# Patient Record
Sex: MALE | Race: WHITE | Employment: FULL TIME | URBAN - METROPOLITAN AREA
[De-identification: names, ages, dates, MRNs, and addresses within clinical notes are randomized per-mention and may not be internally consistent; named-entity substitution may affect disease eponyms.]

---

## 2018-10-30 ENCOUNTER — HOSPITAL ENCOUNTER (EMERGENCY)
Age: 38
Discharge: HOME | End: 2018-10-30
Attending: EMERGENCY MEDICINE
Payer: MEDICARE

## 2018-10-30 VITALS
BODY MASS INDEX: 42.27 KG/M2 | OXYGEN SATURATION: 96 % | TEMPERATURE: 98.6 F | DIASTOLIC BLOOD PRESSURE: 98 MMHG | WEIGHT: 278.9 LBS | HEART RATE: 90 BPM | RESPIRATION RATE: 17 BRPM | HEIGHT: 68 IN | SYSTOLIC BLOOD PRESSURE: 144 MMHG

## 2018-10-30 DIAGNOSIS — R11.2 NAUSEA VOMITING AND DIARRHEA: Primary | ICD-10-CM

## 2018-10-30 DIAGNOSIS — R19.7 NAUSEA VOMITING AND DIARRHEA: Primary | ICD-10-CM

## 2018-10-30 PROCEDURE — 6370000000 HC RX 637 (ALT 250 FOR IP): Performed by: EMERGENCY MEDICINE

## 2018-10-30 PROCEDURE — 6360000002 HC RX W HCPCS: Performed by: EMERGENCY MEDICINE

## 2018-10-30 PROCEDURE — 99283 EMERGENCY DEPT VISIT LOW MDM: CPT

## 2018-10-30 RX ORDER — ONDANSETRON 4 MG/1
TABLET, ORALLY DISINTEGRATING ORAL
Status: DISCONTINUED
Start: 2018-10-30 | End: 2018-10-30 | Stop reason: HOSPADM

## 2018-10-30 RX ORDER — ONDANSETRON 4 MG/1
8 TABLET, ORALLY DISINTEGRATING ORAL ONCE
Status: COMPLETED | OUTPATIENT
Start: 2018-10-30 | End: 2018-10-30

## 2018-10-30 RX ORDER — LOPERAMIDE HYDROCHLORIDE 2 MG/1
CAPSULE ORAL
Status: DISCONTINUED
Start: 2018-10-30 | End: 2018-10-30 | Stop reason: HOSPADM

## 2018-10-30 RX ORDER — LOPERAMIDE HYDROCHLORIDE 2 MG/1
4 CAPSULE ORAL ONCE
Status: COMPLETED | OUTPATIENT
Start: 2018-10-30 | End: 2018-10-30

## 2018-10-30 RX ADMIN — ONDANSETRON 8 MG: 4 TABLET, ORALLY DISINTEGRATING ORAL at 18:45

## 2018-10-30 RX ADMIN — LOPERAMIDE HYDROCHLORIDE 4 MG: 2 CAPSULE ORAL at 18:44

## 2018-10-30 NOTE — ED PROVIDER NOTES
CHIEF COMPLAINT  Emesis      HISTORY OF PRESENT ILLNESS  Maynor Robin  is a 34 y.o. male who presents to the ED at via private vehicle complaining of nausea and vomiting as well as diarrhea. Patient reports his symptoms were fairly significant 2-3 days ago stating that he had 5+ episodes of diarrhea as well as severe difficulty in keeping down food or fluids. Patient denies any fevers but had noted some sweats and chills. No abdominal pain was noted. Patient reports that over the last 24 hours his symptoms have almost completely resolved. Patient has been able to tolerate oral food and fluids throughout the day without difficulty. No episodes of diarrhea have been noted. Patient notes only some mild nausea at this time. Patient presents to the emergency department requesting a work note. There are no other complaints, modifying factors or associated symptoms. Nursing notes reviewed. Past medical history:  has no past medical history on file. Past surgical history:  has a past surgical history that includes [place] tooth extraction and hernia repair. Home medications:   Prior to Admission medications    Medication Sig Start Date End Date Taking? Authorizing Provider   ondansetron (ZOFRAN) 4 MG tablet Take 1 tablet by mouth every 8 hours as needed for Nausea or Vomiting 4/2/17   Kiersten Ramos MD       No Known Allergies    Social history:  reports that he has never smoked. He has never used smokeless tobacco. He reports that he drinks alcohol. He reports that he does not use drugs. Family history:  History reviewed. No pertinent family history. REVIEW OF SYSTEMS  6 systems reviewed, pertinent positives per HPI otherwise noted to be negative    PHYSICAL EXAM  Vitals:    10/30/18 1807   BP: (!) 144/98   Pulse: 90   Resp: 17   Temp: 98.6 °F (37 °C)   SpO2: 96%       GENERAL: Patient is well-developed, well-nourished,  no acute distress. No apparent discomfort.   Non toxic

## 2019-03-22 ENCOUNTER — HOSPITAL ENCOUNTER (EMERGENCY)
Age: 39
Discharge: HOME | End: 2019-03-22
Attending: EMERGENCY MEDICINE
Payer: MEDICARE

## 2019-03-22 VITALS
HEART RATE: 94 BPM | DIASTOLIC BLOOD PRESSURE: 99 MMHG | TEMPERATURE: 98 F | OXYGEN SATURATION: 97 % | RESPIRATION RATE: 18 BRPM | SYSTOLIC BLOOD PRESSURE: 160 MMHG

## 2019-03-22 DIAGNOSIS — J01.91 ACUTE RECURRENT SINUSITIS, UNSPECIFIED LOCATION: Primary | ICD-10-CM

## 2019-03-22 PROCEDURE — 99282 EMERGENCY DEPT VISIT SF MDM: CPT

## 2019-03-22 RX ORDER — AMOXICILLIN AND CLAVULANATE POTASSIUM 875; 125 MG/1; MG/1
1 TABLET, FILM COATED ORAL 2 TIMES DAILY
Qty: 20 TABLET | Refills: 0 | Status: SHIPPED | OUTPATIENT
Start: 2019-03-22 | End: 2019-04-01

## 2019-05-07 ENCOUNTER — HOSPITAL ENCOUNTER (EMERGENCY)
Age: 39
Discharge: HOME | End: 2019-05-07
Attending: EMERGENCY MEDICINE
Payer: MEDICARE

## 2019-05-07 VITALS
HEIGHT: 69 IN | WEIGHT: 273.4 LBS | OXYGEN SATURATION: 99 % | HEART RATE: 86 BPM | BODY MASS INDEX: 40.49 KG/M2 | RESPIRATION RATE: 14 BRPM | SYSTOLIC BLOOD PRESSURE: 139 MMHG | DIASTOLIC BLOOD PRESSURE: 98 MMHG | TEMPERATURE: 98.3 F

## 2019-05-07 DIAGNOSIS — R03.0 ELEVATED BLOOD PRESSURE READING: ICD-10-CM

## 2019-05-07 DIAGNOSIS — S61.214A LACERATION OF RIGHT RING FINGER WITHOUT FOREIGN BODY WITHOUT DAMAGE TO NAIL, INITIAL ENCOUNTER: Primary | ICD-10-CM

## 2019-05-07 PROCEDURE — 90715 TDAP VACCINE 7 YRS/> IM: CPT | Performed by: EMERGENCY MEDICINE

## 2019-05-07 PROCEDURE — 6370000000 HC RX 637 (ALT 250 FOR IP): Performed by: EMERGENCY MEDICINE

## 2019-05-07 PROCEDURE — 6360000002 HC RX W HCPCS: Performed by: EMERGENCY MEDICINE

## 2019-05-07 PROCEDURE — 90471 IMMUNIZATION ADMIN: CPT | Performed by: EMERGENCY MEDICINE

## 2019-05-07 PROCEDURE — 99282 EMERGENCY DEPT VISIT SF MDM: CPT

## 2019-05-07 RX ORDER — BACITRACIN ZINC AND POLYMYXIN B SULFATE 500; 1000 [USP'U]/G; [USP'U]/G
OINTMENT TOPICAL ONCE
Status: COMPLETED | OUTPATIENT
Start: 2019-05-07 | End: 2019-05-07

## 2019-05-07 RX ADMIN — BACITRACIN ZINC AND POLYMYXIN B SULFATE: 500; 10000 OINTMENT TOPICAL at 10:49

## 2019-05-07 RX ADMIN — TETANUS TOXOID, REDUCED DIPHTHERIA TOXOID AND ACELLULAR PERTUSSIS VACCINE, ADSORBED 0.5 ML: 5; 2.5; 8; 8; 2.5 SUSPENSION INTRAMUSCULAR at 10:48

## 2019-05-07 ASSESSMENT — PAIN SCALES - GENERAL: PAINLEVEL_OUTOF10: 3

## 2019-05-07 ASSESSMENT — PAIN DESCRIPTION - ORIENTATION: ORIENTATION: RIGHT

## 2019-05-07 ASSESSMENT — PAIN DESCRIPTION - LOCATION: LOCATION: FINGER (COMMENT WHICH ONE)

## 2019-05-07 ASSESSMENT — PAIN DESCRIPTION - PAIN TYPE: TYPE: ACUTE PAIN

## 2019-05-07 NOTE — ED NOTES
Pt states understanding of d/c instructions and medications. Pt alert and oriented with steady gait upon discharge.       Toby Brunner RN  05/07/19 1456

## 2019-05-07 NOTE — ED PROVIDER NOTES
TR[facility] CHIEF COMPLAINT:   Chief Complaint   Patient presents with    Laceration         HPI: Valdez Cooper is a 34 y.o. male who presents to the Emergency Department with complaint of Laceration of the right fourth fingertip which occurred from a glass 3 days ago. Patient is right-handed. Tetanus status is not up-to-date. He has been cleaning it at home but is worried it might be infected. No fever or chills. Denies numbness or weakness. REVIEW OF SYSTEMS:  6 systems reviewed. Pertinent positives per HPI. Otherwise noted to be negative. Nursing notes reviewed and agree with above. Past medical/surgical history reviewed. MEDICATIONS   Patient's Medications    No medications on file         ALLERGIES No Known Allergies      BP (!) 139/98   Pulse 86   Temp 98.3 °F (36.8 °C) (Oral)   Resp 14   Ht 5' 9\" (1.753 m)   Wt 124 kg (273 lb 6.4 oz)   SpO2 99%   BMI 40.37 kg/m²   General:  No acute distress. Non toxic appearance  Head:   Normocephalic and atraumatic  Eyes:   Conjunctiva clear, MONAE, EOM's intact. Sclera anicteric. ENT:   Mucous membranes moist  Neck:   Supple. No adenopathy or jugular venous distension  Lungs/Chest:  No respiratory distress  CVS:   Regular rate and rhythm  Abdomen:  deferred  Extremities:  Full range of motion. Superficial subacute appearing flap-type laceration noted on the ulnar aspect of the right fourth fingertip. No nail or nailbed involvement. No redness or lymphangitic streaking. There is no tenderness. Skin:   No rashes or lesions to exposed skin  Back:   deferred  Neuro:  Alert and OX3. Speech clear and appropriate. No focal weakness. Normal sensation in all extremities. Gait normal.  Psych:   Affect normal. Mood normal        RADIOLOGY:      LAB      ED COURSE / MDM:  Patient with 3 day old laceration on the tip of the right fourth finger. He was worried about infection but there is no evidence of cellulitis. It appears to be healing well.   He is afebrile. Tetanus status was updated. He was given wound care instructions. Advised Tylenol or ibuprofen if needed for pain. I discussed with Sarika Jacobsen the results of evaluation in the Emergency Department, diagnosis, care and prognosis. The plan is to discharge to home. The patient is in agreement with the plan and questions have been answered. I also discussed with the patient and/or family the reasons which may require a return visit and the importance of follow-up care.        (Please note that portions of this note may have been completed with a voice recognition program.  Efforts were made to edit the dictation but occasionally words are mis-transcribed)        FINAL IMPRESSION:  1 -- laceration right fourth finger, subacute  2 -- elevated blood pressure reading                    Rock Stacy MD  05/07/19 5922

## 2019-05-07 NOTE — ED TRIAGE NOTES
Pt with laceration to right 4th finger. Happened Saturday night from a broken wine glass. Unsure of last tetanus.

## 2019-07-07 ENCOUNTER — HOSPITAL ENCOUNTER (EMERGENCY)
Age: 39
Discharge: HOME | End: 2019-07-07
Attending: EMERGENCY MEDICINE
Payer: MEDICARE

## 2019-07-07 VITALS
WEIGHT: 277 LBS | RESPIRATION RATE: 16 BRPM | TEMPERATURE: 97.9 F | DIASTOLIC BLOOD PRESSURE: 90 MMHG | SYSTOLIC BLOOD PRESSURE: 160 MMHG | HEART RATE: 79 BPM | OXYGEN SATURATION: 95 % | BODY MASS INDEX: 40.91 KG/M2

## 2019-07-07 DIAGNOSIS — B97.89 VIRAL SINUSITIS: ICD-10-CM

## 2019-07-07 DIAGNOSIS — H73.011 BULLOUS MYRINGITIS OF RIGHT EAR: ICD-10-CM

## 2019-07-07 DIAGNOSIS — H81.11 BENIGN PAROXYSMAL POSITIONAL VERTIGO OF RIGHT EAR: ICD-10-CM

## 2019-07-07 DIAGNOSIS — J06.9 VIRAL UPPER RESPIRATORY INFECTION: ICD-10-CM

## 2019-07-07 DIAGNOSIS — J01.10 ACUTE FRONTAL SINUSITIS, RECURRENCE NOT SPECIFIED: Primary | ICD-10-CM

## 2019-07-07 DIAGNOSIS — J32.9 VIRAL SINUSITIS: ICD-10-CM

## 2019-07-07 PROCEDURE — 99283 EMERGENCY DEPT VISIT LOW MDM: CPT

## 2019-07-07 RX ORDER — MECLIZINE HYDROCHLORIDE 25 MG/1
25 TABLET ORAL 3 TIMES DAILY PRN
Qty: 20 TABLET | Refills: 0 | Status: SHIPPED | OUTPATIENT
Start: 2019-07-07 | End: 2020-04-14

## 2019-07-07 RX ORDER — ECHINACEA PURPUREA EXTRACT 125 MG
2 TABLET ORAL PRN
Qty: 1 BOTTLE | Refills: 3 | Status: SHIPPED | OUTPATIENT
Start: 2019-07-07 | End: 2020-04-14

## 2019-07-07 RX ORDER — IBUPROFEN 600 MG/1
600 TABLET ORAL EVERY 6 HOURS PRN
Qty: 30 TABLET | Refills: 0 | OUTPATIENT
Start: 2019-07-07 | End: 2020-12-18

## 2019-07-07 RX ORDER — PSEUDOEPHEDRINE HCL 120 MG/1
120 TABLET, FILM COATED, EXTENDED RELEASE ORAL EVERY 12 HOURS PRN
Qty: 20 TABLET | Refills: 1 | Status: SHIPPED | OUTPATIENT
Start: 2019-07-07 | End: 2019-07-14

## 2019-07-07 ASSESSMENT — PAIN DESCRIPTION - LOCATION: LOCATION: HEAD

## 2019-07-07 ASSESSMENT — PAIN DESCRIPTION - FREQUENCY: FREQUENCY: INTERMITTENT

## 2019-07-07 ASSESSMENT — PAIN DESCRIPTION - DESCRIPTORS: DESCRIPTORS: DISCOMFORT;POUNDING

## 2019-07-07 ASSESSMENT — PAIN SCALES - GENERAL: PAINLEVEL_OUTOF10: 4

## 2019-07-07 ASSESSMENT — PAIN DESCRIPTION - ORIENTATION: ORIENTATION: RIGHT;LEFT

## 2019-07-07 ASSESSMENT — PAIN DESCRIPTION - PAIN TYPE: TYPE: ACUTE PAIN

## 2019-08-04 ENCOUNTER — HOSPITAL ENCOUNTER (EMERGENCY)
Age: 39
Discharge: HOME | End: 2019-08-04
Attending: EMERGENCY MEDICINE
Payer: MEDICARE

## 2019-08-04 VITALS
HEART RATE: 89 BPM | DIASTOLIC BLOOD PRESSURE: 98 MMHG | TEMPERATURE: 98.3 F | SYSTOLIC BLOOD PRESSURE: 129 MMHG | OXYGEN SATURATION: 99 % | BODY MASS INDEX: 40.73 KG/M2 | RESPIRATION RATE: 12 BRPM | WEIGHT: 275 LBS | HEIGHT: 69 IN

## 2019-08-04 DIAGNOSIS — R11.2 NON-INTRACTABLE VOMITING WITH NAUSEA, UNSPECIFIED VOMITING TYPE: Primary | ICD-10-CM

## 2019-08-04 PROCEDURE — 99281 EMR DPT VST MAYX REQ PHY/QHP: CPT

## 2019-08-24 ENCOUNTER — HOSPITAL ENCOUNTER (EMERGENCY)
Age: 39
Discharge: HOME | End: 2019-08-24
Attending: EMERGENCY MEDICINE
Payer: MEDICARE

## 2019-08-24 VITALS
RESPIRATION RATE: 18 BRPM | OXYGEN SATURATION: 96 % | BODY MASS INDEX: 41.35 KG/M2 | TEMPERATURE: 98.5 F | SYSTOLIC BLOOD PRESSURE: 143 MMHG | WEIGHT: 280 LBS | HEART RATE: 112 BPM | DIASTOLIC BLOOD PRESSURE: 95 MMHG

## 2019-08-24 DIAGNOSIS — J01.00 ACUTE MAXILLARY SINUSITIS, RECURRENCE NOT SPECIFIED: Primary | ICD-10-CM

## 2019-08-24 PROCEDURE — 6370000000 HC RX 637 (ALT 250 FOR IP): Performed by: EMERGENCY MEDICINE

## 2019-08-24 PROCEDURE — 99283 EMERGENCY DEPT VISIT LOW MDM: CPT

## 2019-08-24 RX ORDER — DOXYCYCLINE 100 MG/1
100 TABLET ORAL 2 TIMES DAILY
Qty: 20 TABLET | Refills: 0 | Status: SHIPPED | OUTPATIENT
Start: 2019-08-24 | End: 2019-09-03

## 2019-08-24 RX ORDER — DOXYCYCLINE 100 MG/1
100 CAPSULE ORAL ONCE
Status: COMPLETED | OUTPATIENT
Start: 2019-08-24 | End: 2019-08-24

## 2019-08-24 RX ORDER — BENZONATATE 100 MG/1
100 CAPSULE ORAL 3 TIMES DAILY PRN
Qty: 30 CAPSULE | Refills: 0 | Status: SHIPPED | OUTPATIENT
Start: 2019-08-24 | End: 2019-08-31

## 2019-08-24 RX ORDER — CETIRIZINE HYDROCHLORIDE 10 MG/1
10 TABLET ORAL DAILY
Qty: 20 TABLET | Refills: 1 | Status: SHIPPED | OUTPATIENT
Start: 2019-08-24 | End: 2020-04-14

## 2019-08-24 RX ADMIN — DOXYCYCLINE 100 MG: 100 CAPSULE ORAL at 20:53

## 2019-08-24 ASSESSMENT — PAIN DESCRIPTION - LOCATION: LOCATION: GENERALIZED;HEAD

## 2019-08-24 ASSESSMENT — PAIN SCALES - GENERAL: PAINLEVEL_OUTOF10: 2

## 2019-08-24 ASSESSMENT — PAIN DESCRIPTION - DESCRIPTORS: DESCRIPTORS: DISCOMFORT;PRESSURE

## 2019-08-24 ASSESSMENT — PAIN DESCRIPTION - PAIN TYPE: TYPE: ACUTE PAIN

## 2019-08-25 NOTE — ED PROVIDER NOTES
CETIRIZINE (ZYRTEC ALLERGY) 10 MG TABLET    Take 1 tablet by mouth daily    DOXYCYCLINE MONOHYDRATE (ADOXA) 100 MG TABLET    Take 1 tablet by mouth 2 times daily for 10 days       DISCONTINUED MEDICATIONS:  Discontinued Medications    No medications on file              (Please note that portions of this note were completed with a voice recognition program.  Efforts were made to edit the dictations but occasionally words are mis-transcribed.)    Teena Cody MD (electronically signed)      Teena Cody MD  08/24/19 6578

## 2020-04-14 ENCOUNTER — HOSPITAL ENCOUNTER (EMERGENCY)
Age: 40
Discharge: HOME | End: 2020-04-14
Attending: EMERGENCY MEDICINE
Payer: MEDICARE

## 2020-04-14 VITALS
DIASTOLIC BLOOD PRESSURE: 100 MMHG | HEART RATE: 102 BPM | TEMPERATURE: 98 F | OXYGEN SATURATION: 97 % | HEIGHT: 69 IN | BODY MASS INDEX: 41.59 KG/M2 | SYSTOLIC BLOOD PRESSURE: 144 MMHG | RESPIRATION RATE: 16 BRPM | WEIGHT: 280.8 LBS

## 2020-04-14 PROCEDURE — 99282 EMERGENCY DEPT VISIT SF MDM: CPT

## 2020-04-14 RX ORDER — TOBRAMYCIN 3 MG/ML
1 SOLUTION/ DROPS OPHTHALMIC EVERY 4 HOURS
Qty: 1 BOTTLE | Refills: 0 | Status: SHIPPED | OUTPATIENT
Start: 2020-04-14 | End: 2020-04-24

## 2020-04-14 ASSESSMENT — VISUAL ACUITY
OU: 20/25
OS: 20/25
OD: 20/25

## 2020-09-18 ENCOUNTER — HOSPITAL ENCOUNTER (EMERGENCY)
Age: 40
Discharge: HOME | End: 2020-09-18
Attending: EMERGENCY MEDICINE
Payer: MEDICARE

## 2020-09-18 VITALS
BODY MASS INDEX: 41.16 KG/M2 | HEART RATE: 102 BPM | RESPIRATION RATE: 16 BRPM | DIASTOLIC BLOOD PRESSURE: 108 MMHG | OXYGEN SATURATION: 99 % | TEMPERATURE: 98.2 F | SYSTOLIC BLOOD PRESSURE: 144 MMHG | WEIGHT: 277.9 LBS | HEIGHT: 69 IN

## 2020-09-18 PROCEDURE — 99283 EMERGENCY DEPT VISIT LOW MDM: CPT

## 2020-09-18 ASSESSMENT — ENCOUNTER SYMPTOMS
BACK PAIN: 0
SINUS PRESSURE: 1
RHINORRHEA: 1
SORE THROAT: 1
SINUS PAIN: 1
SHORTNESS OF BREATH: 0
DIARRHEA: 0
NAUSEA: 0
COUGH: 1
VOMITING: 0
CONSTIPATION: 0
ABDOMINAL PAIN: 0

## 2020-09-18 NOTE — ED PROVIDER NOTES
[id number] [address] ENCOUNTER      Pt Name: Darrius Cline  MRN: [id number]  [place] 1980  Date of evaluation: 9/18/2020  Provider: David Fiore MD    [address] [place] [address]       Chief Complaint   Patient presents with    Headache    Facial Pain     x 5 days         HISTORY OF PRESENT ILLNESS   (Location/Symptom, Timing/Onset, Context/Setting, Quality, Duration, Modifying Factors, Severity)  Note limiting factors. Darrius Cline is a 41 y.o. male who presents to the emergency department     Patient is a 43-year-old male with a past medical history of hypertension and hyperlipidemia who presents with sinus headache and drainage. Patient reports that his headache started on Monday. He states that the headache is gotten significantly better but the sinus pressure and postnasal drip has continued. Patient reports that he has had some drainage and discomfort in his right ear. He reports some discomfort in the back of his throat due to the drainage down the back of his throat. Reports occasional cough but denies any shortness of breath. Denies any fevers or chills. Patient reports that he has a history of chronic sinusitis. He states that every few months he gets a flareup of symptoms. Symptoms caused him to miss work today and they would not allow him to return until he was evaluated. The history is provided by the patient. Nursing Notes were reviewed. REVIEW OF SYSTEMS    (2-9 systems for level 4, 10 or more for level 5)     Review of Systems   Constitutional: Negative for chills and fever. HENT: Positive for congestion, ear pain, postnasal drip, rhinorrhea, sinus pressure, sinus pain and sore throat. Eyes: Negative for visual disturbance. Respiratory: Positive for cough. Negative for shortness of breath. Cardiovascular: Negative for chest pain. Gastrointestinal: Negative for abdominal pain, constipation, diarrhea, nausea and vomiting. Genitourinary: Negative for dysuria and hematuria. Musculoskeletal: Negative for back pain and neck pain. Skin: Negative for pallor and rash. Neurological: Positive for headaches. Negative for dizziness and light-headedness. All other systems reviewed and are negative. Except as noted above the remainder of the review of systems was reviewed and negative. PAST MEDICAL HISTORY     Past Medical History:   Diagnosis Date    Hyperlipidemia     Hypertension          SURGICAL HISTORY       Past Surgical History:   Procedure Laterality Date    HERNIA REPAIR      WISDOM TOOTH EXTRACTION           CURRENT MEDICATIONS       Previous Medications    IBUPROFEN (ADVIL;MOTRIN) 600 MG TABLET    Take 1 tablet by mouth every 6 hours as needed for Pain       ALLERGIES     Patient has no known allergies. FAMILY HISTORY     History reviewed. No pertinent family history.        SOCIAL HISTORY       Social History     Socioeconomic History    Marital status: Single     Spouse name: None    Number of children: None    Years of education: None    Highest education level: None   Occupational History    None   Social Needs    Financial resource strain: None    Food insecurity     Worry: None     Inability: None    Transportation needs     Medical: None     Non-medical: None   Tobacco Use    Smoking status: Never Smoker    Smokeless tobacco: Never Used   Substance and Sexual Activity    Alcohol use: Not Currently    Drug use: No    Sexual activity: None   Lifestyle    Physical activity     Days per week: None     Minutes per session: None    Stress: None   Relationships    Social connections     Talks on phone: None     Gets together: None     Attends Baptism service: None     Active member of club or organization: None     Attends meetings of clubs or organizations: None     Relationship status: None    Intimate partner violence     Fear of current or ex partner: None     Emotionally abused: None Physically abused: None     Forced sexual activity: None   Other Topics Concern    None   Social History Narrative    None       SCREENINGS               PHYSICAL EXAM    (up to 7 for level 4, 8 or more for level 5)     ED Triage Vitals   BP Temp Temp src Pulse Resp SpO2 Height Weight   -- -- -- -- -- -- -- --       Physical Exam  Vitals signs and nursing note reviewed. Constitutional:       General: He is not in acute distress. Appearance: Normal appearance. HENT:      Head: Normocephalic and atraumatic. Right Ear: Tympanic membrane and ear canal normal.      Left Ear: Tympanic membrane and ear canal normal.      Nose: Congestion and rhinorrhea present. Mouth/Throat:      Mouth: Mucous membranes are moist.      Pharynx: No posterior oropharyngeal erythema. Eyes:      Conjunctiva/sclera: Conjunctivae normal.   Neck:      Musculoskeletal: Normal range of motion and neck supple. Cardiovascular:      Rate and Rhythm: Normal rate and regular rhythm. Pulses: Normal pulses. Heart sounds: Normal heart sounds. No murmur. Pulmonary:      Effort: Pulmonary effort is normal. No respiratory distress. Breath sounds: Normal breath sounds. Abdominal:      General: There is no distension. Palpations: Abdomen is soft. Tenderness: There is no abdominal tenderness. Musculoskeletal: Normal range of motion. General: No swelling or deformity. Skin:     General: Skin is warm and dry. Neurological:      General: No focal deficit present. Mental Status: He is alert and oriented to person, place, and time.          DIAGNOSTIC RESULTS     EKG: All EKG's are interpreted by the Emergency Department Physician who either signs or Co-signs this chart in the absence of a cardiologist.        RADIOLOGY:     Interpretation per the Radiologist below, if available at the time of this note:    No orders to display         LABS:  Labs Reviewed - No data to display    All other labs were within normal range or not returned as of this dictation. EMERGENCY DEPARTMENT COURSE and DIFFERENTIAL DIAGNOSIS/MDM:   Vitals:    Vitals:    09/18/20 0334   BP: (!) 144/108   Pulse: 102   Resp: 16   Temp: 98.2 °F (36.8 °C)   TempSrc: Oral   SpO2: 99%   Weight: 277 lb 14.4 oz (126.1 kg)   Height: 5' 9\" (1.753 m)       Patient evaluated and previous record reviewed. Patient presents with headache and sinus drainage. Vital signs notable for mild tachycardia improved with rest.  Physical exam as documented above. Patient symptoms most consistent with postnasal drip and sinus headache. Advised patient of supportive care measures at home. Do not believe he needs an antibiotic at this time as symptoms started 5 days ago. Patient mostly just needs to be evaluated so that he can return to work on Monday. Will provide him with documentation. Advised of return precautions. Patient discharged home. CONSULTS:  None    PROCEDURES:  Unless otherwise noted below, none     Procedures      FINAL IMPRESSION      1. Postnasal drip    2. Sinus headache          DISPOSITION/PLAN   DISPOSITION        PATIENT REFERRED TO:  Juanamechelleherve Garay  [address] [id number]  [phone]    Schedule an appointment as soon as possible for a visit         DISCHARGE MEDICATIONS:  New Prescriptions    No medications on file     Controlled Substances Monitoring:     No flowsheet data found.     (Please note that portions of this note were completed with a voice recognition program.  Efforts were made to edit the dictations but occasionally words are mis-transcribed.)    Tristan Juares MD (electronically signed)  Attending Emergency Physician           Valerie Danielson MD  09/18/20 4137

## 2020-09-18 NOTE — ED NOTES
Pt to ed with c/o headache 4 days ago, 3 days ago started having sinus drainage and pressure in face, tonight states his headache is gone, just having sinus drainage and pressure, and he also needs a work note to go back to work.      Harini Royal RN  09/18/20 0268

## 2020-09-18 NOTE — ED NOTES
Pt dc/d with instructions and work note in stable condition, ambulatory to lobby. Home per ride.      Patrick Smith RN  09/18/20 0093

## 2020-10-27 ENCOUNTER — HOSPITAL ENCOUNTER (EMERGENCY)
Age: 40
Discharge: HOME | End: 2020-10-27
Attending: EMERGENCY MEDICINE
Payer: MEDICARE

## 2020-10-27 VITALS
WEIGHT: 282.9 LBS | HEART RATE: 69 BPM | TEMPERATURE: 97.7 F | SYSTOLIC BLOOD PRESSURE: 136 MMHG | OXYGEN SATURATION: 99 % | RESPIRATION RATE: 18 BRPM | DIASTOLIC BLOOD PRESSURE: 91 MMHG | BODY MASS INDEX: 41.78 KG/M2

## 2020-10-27 PROCEDURE — 99282 EMERGENCY DEPT VISIT SF MDM: CPT

## 2020-10-27 RX ORDER — FLUTICASONE PROPIONATE 50 MCG
1 SPRAY, SUSPENSION (ML) NASAL DAILY
Qty: 2 BOTTLE | Refills: 1 | Status: SHIPPED | OUTPATIENT
Start: 2020-10-27 | End: 2020-12-18

## 2020-10-27 RX ORDER — BENZONATATE 100 MG/1
100 CAPSULE ORAL 3 TIMES DAILY PRN
Qty: 30 CAPSULE | Refills: 0 | Status: SHIPPED | OUTPATIENT
Start: 2020-10-27 | End: 2020-11-03

## 2020-10-27 RX ORDER — LORATADINE 10 MG/1
10 TABLET ORAL DAILY
Qty: 30 TABLET | Refills: 0 | Status: SHIPPED | OUTPATIENT
Start: 2020-10-27 | End: 2020-12-18

## 2020-10-28 NOTE — ED NOTES
Patient prepared for and ready to be discharged. Patient discharged at this time in no acute distress after verbalizing understanding of discharge instructions. Patient left after receiving After Visit Summary instructions.         Cindy Howard RN  10/27/20 7119

## 2020-10-28 NOTE — ED PROVIDER NOTES
Emergency Physician Note    Chief Complaint  Sinusitis       History of Present Illness  Disha Martinez is a 41 y.o. male who presents to the ED for sinusitis. Patient reports that for last few days he has had nasal congestion and drainage and coughing and even had some episodes of vomiting with the coughing. No fevers, chills or sweats. No exposure to COVID-19. No chest pain or shortness of breath. He states this happens just about every year. He called off of work and was told that he needed a note to return to work. He has no other complaints. 10 systems reviewed, pertinent positives per HPI otherwise noted to be negative    I have reviewed the following from the nursing documentation:      Prior to Admission medications    Medication Sig Start Date End Date Taking? Authorizing Provider   ibuprofen (ADVIL;MOTRIN) 600 MG tablet Take 1 tablet by mouth every 6 hours as needed for Pain 7/7/19   Francisco Javier Hong MD       Allergies as of 10/27/2020    (No Known Allergies)       Past Medical History:   Diagnosis Date    Hyperlipidemia     Hypertension         Surgical History:   Past Surgical History:   Procedure Laterality Date    HERNIA REPAIR      WISDOM TOOTH EXTRACTION          Family History:  History reviewed. No pertinent family history.     Social History     Socioeconomic History    Marital status: Single     Spouse name: Not on file    Number of children: Not on file    Years of education: Not on file    Highest education level: Not on file   Occupational History    Not on file   Social Needs    Financial resource strain: Not on file    Food insecurity     Worry: Not on file     Inability: Not on file    Transportation needs     Medical: Not on file     Non-medical: Not on file   Tobacco Use    Smoking status: Never Smoker    Smokeless tobacco: Never Used   Substance and Sexual Activity    Alcohol use: Not Currently    Drug use: No    Sexual activity: Not on file   Lifestyle    Physical activity     Days per week: Not on file     Minutes per session: Not on file    Stress: Not on file   Relationships    Social connections     Talks on phone: Not on file     Gets together: Not on file     Attends [employer] service: Not on file     Active member of club or organization: Not on file     Attends meetings of clubs or organizations: Not on file     Relationship status: Not on file    Intimate partner violence     Fear of current or ex partner: Not on file     Emotionally abused: Not on file     Physically abused: Not on file     Forced sexual activity: Not on file   Other Topics Concern    Not on file   Social History Narrative    Not on file       Nursing notes reviewed. ED Triage Vitals [10/27/20 2230]   Enc Vitals Group      BP (!) 136/91      Pulse 69      Resp 18      Temp 97.7 °F (36.5 °C)      Temp src       SpO2 99 %      Weight 282 lb 14.4 oz (128.3 kg)      Height       Head Circumference       Peak Flow       Pain Score       Pain Loc       Pain Edu? Excl. in [address]? GENERAL:  Awake, alert. Well developed, well nourished with no apparent distress. HENT:  Normocephalic, Atraumatic, moist mucous membranes. Bilateral TMs without erythema or effusion. Nasopharynx congested with mucus, posterior oropharynx with no edema or exudate but slight erythema noted. EYES:  Pupils equal round and reactive to light, Conjunctiva normal, extraocular movements normal.  NECK:  No meningeal signs, Supple. CHEST:  Regular rate and rhythm, chest wall non-tender. LUNGS:  Clear to auscultation bilaterally. ABDOMEN:  Soft, non-tender, no rebound, rigidity or guarding, non-distended, normal bowel sounds. No costovertebral angle tenderness to palpation. BACK:  No tenderness. EXTREMITIES:  Normal range of motion, no edema, no bony tenderness, no deformity, distal pulses present. SKIN: Warm, dry and intact. NEUROLOGIC: Normal mental status. Moving all extremities to command. MEDICAL DECISION MAKING    I advised the patient to return to the emergency department immediately for any new or worsening symptoms, such as fever, chest pain or shortness of breath. The patient voiced agreement and understanding of the treatment plan. No results found for this visit on 10/27/20. I estimate there is LOW risk for EPIGLOTTITIS, PNEUMONIA, MENINGITIS, OR URINARY TRACT INFECTION, thus I consider the discharge disposition reasonable. Also, there is no evidence or peritonitis, sepsis, or toxicity. Bernardine Leyden and I have discussed the diagnosis and risks, and we agree with discharging home to follow-up with their primary doctor. We also discussed returning to the Emergency Department immediately if new or worsening symptoms occur. We have discussed the symptoms which are most concerning (e.g., changing or worsening pain, trouble swallowing or breating, neck stiffness, fever) that necessitate immediate return. Final Impression    1. Acute sinusitis, recurrence not specified, unspecified location        Discharge Vital Signs:  Blood pressure (!) 136/91, pulse 69, temperature 97.7 °F (36.5 °C), resp. rate 18, weight 282 lb 14.4 oz (128.3 kg), SpO2 99 %. Patient was given scripts for the following medications. I counseled patient how to take these medications. New Prescriptions    BENZONATATE (TESSALON PERLES) 100 MG CAPSULE    Take 1 capsule by mouth 3 times daily as needed for Cough    FLUTICASONE (FLONASE) 50 MCG/ACT NASAL SPRAY    1 spray by Each Nostril route daily    LORATADINE (CLARITIN) 10 MG TABLET    Take 1 tablet by mouth daily       Disposition  Pt is in good condition upon Discharge to home. This chart was generated using the [address] [place] [address] [address] dictation system. I created this record but it may contain dictation errors.           Fatoumata Miner MD  10/27/20 0169

## 2020-12-18 ENCOUNTER — HOSPITAL ENCOUNTER (EMERGENCY)
Age: 40
Discharge: HOME | End: 2020-12-18
Attending: EMERGENCY MEDICINE
Payer: MEDICARE

## 2020-12-18 VITALS
SYSTOLIC BLOOD PRESSURE: 136 MMHG | RESPIRATION RATE: 18 BRPM | DIASTOLIC BLOOD PRESSURE: 91 MMHG | OXYGEN SATURATION: 99 % | BODY MASS INDEX: 41.47 KG/M2 | TEMPERATURE: 97.9 F | WEIGHT: 280 LBS | HEIGHT: 69 IN | HEART RATE: 99 BPM

## 2020-12-18 PROCEDURE — 99283 EMERGENCY DEPT VISIT LOW MDM: CPT

## 2020-12-18 RX ORDER — FLUTICASONE PROPIONATE 50 MCG
2 SPRAY, SUSPENSION (ML) NASAL DAILY
Qty: 1 BOTTLE | Refills: 0 | Status: SHIPPED | OUTPATIENT
Start: 2020-12-18 | End: 2021-07-23 | Stop reason: ALTCHOICE

## 2020-12-18 NOTE — ED PROVIDER NOTES
Dry nasal discharge noted. NECK: Supple with normal ROM. Trachea midline  LUNGS:  Normal work of breathing. Speaking comfortably in full sentences. EXTREMITIES: 2+ distal pulses w/o edema. MUSCULOSKELETAL:  Atraumatic extremities with normal ROM grossly. No obvious bony deformities. SKIN: Warm/dry. No rashes/lesions noted. PSYCHIATRIC: Patient is alert and oriented with normal affect  NEUROLOGIC: Cranial nerves grossly intact. Moves all extremities with equal strength. No gross sensory deficits. Answers questions/follows commands appropriately. ED COURSE/MDM  Nursing notes reviewed. Pt was given the following medications or treatments in the ED: none    Clinical Impression  Based on the presenting complaint, history, and physical exam, multiple diagnoses were considered. Exam and workup here most c/w:  1. Acute upper respiratory infection    2. Nasal congestion        I discussed with Jeremy Choi the results of evaluation in the ED, diagnosis, care, and prognosis. The plan is to discharge to home. Patient is in agreement with plan and questions have been answered. I also discussed with Jeremy Rater the reasons which may require a return visit and the importance of follow-up care. The patient is well-appearing, nontoxic, and improved at the time of discharge. Patient agrees to call to arrange follow-up care as directed. Jeremy Rater understands to return immediately for worsening/change in symptoms. Patient will be started on the following medications from the ED:  New Prescriptions    FLUTICASONE (FLONASE) 50 MCG/ACT NASAL SPRAY    2 sprays by Each Nostril route daily         Disposition  Pt is discharged in stable condition.     Disposition Vitals:  BP (!) 136/91   Pulse 99   Temp 97.9 °F (36.6 °C) (Oral)   Resp 18   Ht 5' 9\" (1.753 m)   Wt 280 lb (127 kg)   SpO2 99%   BMI 41.35 kg/m²                    Danii Leal DO  12/18/20 7093

## 2021-07-23 ENCOUNTER — HOSPITAL ENCOUNTER (EMERGENCY)
Age: 41
Discharge: HOME | End: 2021-07-23
Attending: EMERGENCY MEDICINE
Payer: MEDICARE

## 2021-07-23 VITALS
OXYGEN SATURATION: 98 % | TEMPERATURE: 97.9 F | HEART RATE: 86 BPM | SYSTOLIC BLOOD PRESSURE: 150 MMHG | RESPIRATION RATE: 16 BRPM | DIASTOLIC BLOOD PRESSURE: 103 MMHG | BODY MASS INDEX: 44.44 KG/M2 | HEIGHT: 69 IN | WEIGHT: 300.06 LBS

## 2021-07-23 DIAGNOSIS — G89.29 CHRONIC PAIN OF BOTH KNEES: Primary | ICD-10-CM

## 2021-07-23 DIAGNOSIS — M25.561 CHRONIC PAIN OF BOTH KNEES: Primary | ICD-10-CM

## 2021-07-23 DIAGNOSIS — M25.562 CHRONIC PAIN OF BOTH KNEES: Primary | ICD-10-CM

## 2021-07-23 PROCEDURE — 6360000002 HC RX W HCPCS: Performed by: EMERGENCY MEDICINE

## 2021-07-23 PROCEDURE — 96372 THER/PROPH/DIAG INJ SC/IM: CPT

## 2021-07-23 PROCEDURE — 99283 EMERGENCY DEPT VISIT LOW MDM: CPT

## 2021-07-23 RX ORDER — KETOROLAC TROMETHAMINE 30 MG/ML
30 INJECTION, SOLUTION INTRAMUSCULAR; INTRAVENOUS ONCE
Status: COMPLETED | OUTPATIENT
Start: 2021-07-23 | End: 2021-07-23

## 2021-07-23 RX ADMIN — KETOROLAC TROMETHAMINE 30 MG: 30 INJECTION, SOLUTION INTRAMUSCULAR at 16:11

## 2021-07-23 ASSESSMENT — PAIN SCALES - GENERAL
PAINLEVEL_OUTOF10: 8
PAINLEVEL_OUTOF10: 8
PAINLEVEL_OUTOF10: 4

## 2021-07-23 ASSESSMENT — PAIN DESCRIPTION - PAIN TYPE
TYPE: ACUTE PAIN
TYPE: ACUTE PAIN

## 2021-07-23 ASSESSMENT — PAIN DESCRIPTION - ORIENTATION
ORIENTATION: RIGHT;LEFT
ORIENTATION: RIGHT;LEFT

## 2021-07-23 ASSESSMENT — PAIN DESCRIPTION - LOCATION
LOCATION: KNEE
LOCATION: KNEE

## 2021-07-23 ASSESSMENT — PAIN DESCRIPTION - DESCRIPTORS: DESCRIPTORS: CONSTANT

## 2021-07-23 NOTE — ED PROVIDER NOTES
Emergency Department Attending Note    Candace Welsh MD    Date of ED VIsit: 7/23/2021    CHIEF COMPLAINT  No chief complaint on file. HISTORY OF PRESENT ILLNESS  Torey Goff is a 35 y.o. male  With Vital signs of BP (!) 150/103   Pulse 86   Temp 97.9 °F (36.6 °C) (Oral)   Resp 16   Ht 5' 9\" (1.753 m)   Wt (!) 300 lb 1 oz (136.1 kg)   SpO2 98%   BMI 44.31 kg/m²  who presents to the ED with a complaint of chronic knee pain. Patient seen and evaluated in room 4. Patient has had 2 months of chronic knee pain that seems to exacerbate over the past couple of days prompting him to not go to work. He now comes down here expecting some answers regarding his knees. He says he fell today but caught himself and did not fall onto his knees. I can examine his knees and there is no painful areas. He is able to ambulate he ambulated here from where his home is. But is concerned about going to work because he lifts heavy trays of dishes. .  No other complaints, modifying factors or associated symptoms. Patients Past medical history reviewed and listed below  Past Medical History:   Diagnosis Date    Hyperlipidemia     Hypertension      Past Surgical History:   Procedure Laterality Date    HERNIA REPAIR      WISDOM TOOTH EXTRACTION         I have reviewed the following from the nursing documentation. No family history on file.   Social History     Socioeconomic History    Marital status: Single     Spouse name: Not on file    Number of children: Not on file    Years of education: Not on file    Highest education level: Not on file   Occupational History    Not on file   Tobacco Use    Smoking status: Never Smoker    Smokeless tobacco: Never Used   Substance and Sexual Activity    Alcohol use: Not Currently    Drug use: No    Sexual activity: Not on file   Other Topics Concern    Not on file   Social History Narrative    Not on file     Social Determinants of Health     Financial Resource Strain:     Difficulty of Paying Living Expenses:    Food Insecurity:     Worried About Running Out of Food in the Last Year:     [address] in the Last Year:    Transportation Needs:     Lack of Transportation (Medical):  Lack of Transportation (Non-Medical):    Physical Activity:     Days of Exercise per Week:     Minutes of Exercise per Session:    Stress:     Feeling of Stress :    Social Connections:     Frequency of Communication with Friends and Family:     Frequency of Social Gatherings with Friends and Family:     Attends Spiritism Services:     Active Member of Clubs or Organizations:     Attends Club or Organization Meetings:     Marital Status:    Intimate Partner Violence:     Fear of Current or Ex-Partner:     Emotionally Abused:     Physically Abused:     Sexually Abused:      No current facility-administered medications for this encounter. Current Outpatient Medications   Medication Sig Dispense Refill    fluticasone (FLONASE) 50 MCG/ACT nasal spray 2 sprays by Each Nostril route daily 1 Bottle 0     No Known Allergies    REVIEW OF SYSTEMS  10 systems reviewed, pertinent positives per HPI otherwise noted to be negative     PHYSICAL EXAM  BP (!) 150/103   Pulse 86   Temp 97.9 °F (36.6 °C) (Oral)   Resp 16   Ht 5' 9\" (1.753 m)   Wt (!) 300 lb 1 oz (136.1 kg)   SpO2 98%   BMI 44.31 kg/m²   GENERAL APPEARANCE: Awake and alert. Cooperative. In no obvious distress. HEAD: Normocephalic. Atraumatic. EYES: PERRL. EOM's grossly intact. ENT: Mucous membranes are pink and moist.   NECK: Supple. HEART: RRR. No murmurs. LUNGS: Respirations unlabored. CTAB. Good air exchange. ABDOMEN: Soft. Non-distended. Non-tender. No masses. No organomegaly. No guarding or rebound. EXTREMITIES: No peripheral edema. Moves all extremities equally. All extremities neurovascularly intact. SKIN: Warm and dry. No acute rashes. NEUROLOGICAL: Alert and oriented.   Patient is grossly intact in the lower extremities is able ambulate with normal gait. Strength 5/5, sensation intact. Gait normal.   PSYCHIATRIC: Normal mood and affect. No HI or SI expressed to me. RADIOLOGY    If acquired see below     EKG:     If acquired see below       ED COURSE/MDM    I explained to the patient that there was not much we could do especially since there is no significant trauma. Even if there were treatments of the knees there is very low yield of any radiographs to the knee. He needs to be seen by an orthopedic surgeon who can manage his chronic knee pain. He will be given appropriate referral numbers. He will also be given a shot of Toradol prior prior to discharge to make him feel as though he got some treatment here. The ED course and plan were reviewed and results discussed with the patient    The patient understood and agreed with the Discharge/transfer planning.     CLINICAL IMPRESSION and DISPOSITION    Jazlyn Martinez was stable and diagnosed with bilateral knee pain chronic    Patient was treated with Toradol       Nikia Brewster MD  07/23/21 [id number]

## 2021-07-27 ENCOUNTER — OFFICE VISIT (OUTPATIENT)
Dept: PRIMARY CARE CLINIC | Age: 41
End: 2021-07-27
Payer: MEDICARE

## 2021-07-27 VITALS
HEIGHT: 70 IN | OXYGEN SATURATION: 98 % | WEIGHT: 297 LBS | HEART RATE: 108 BPM | TEMPERATURE: 97 F | DIASTOLIC BLOOD PRESSURE: 87 MMHG | BODY MASS INDEX: 42.52 KG/M2 | SYSTOLIC BLOOD PRESSURE: 136 MMHG

## 2021-07-27 DIAGNOSIS — E66.01 CLASS 3 SEVERE OBESITY DUE TO EXCESS CALORIES WITHOUT SERIOUS COMORBIDITY WITH BODY MASS INDEX (BMI) OF 40.0 TO 44.9 IN ADULT (HCC): ICD-10-CM

## 2021-07-27 DIAGNOSIS — Z11.59 NEED FOR HEPATITIS C SCREENING TEST: ICD-10-CM

## 2021-07-27 DIAGNOSIS — Z86.59 HISTORY OF BIPOLAR DISORDER: ICD-10-CM

## 2021-07-27 DIAGNOSIS — Z86.79 HISTORY OF HYPERTENSION: ICD-10-CM

## 2021-07-27 DIAGNOSIS — G89.29 CHRONIC PAIN OF BOTH KNEES: Primary | ICD-10-CM

## 2021-07-27 DIAGNOSIS — M25.561 CHRONIC PAIN OF BOTH KNEES: Primary | ICD-10-CM

## 2021-07-27 DIAGNOSIS — M25.562 CHRONIC PAIN OF BOTH KNEES: Primary | ICD-10-CM

## 2021-07-27 DIAGNOSIS — Z00.00 ANNUAL PHYSICAL EXAM: ICD-10-CM

## 2021-07-27 PROBLEM — E66.813 CLASS 3 SEVERE OBESITY DUE TO EXCESS CALORIES WITHOUT SERIOUS COMORBIDITY WITH BODY MASS INDEX (BMI) OF 40.0 TO 44.9 IN ADULT: Status: ACTIVE | Noted: 2021-07-27

## 2021-07-27 PROCEDURE — 99386 PREV VISIT NEW AGE 40-64: CPT | Performed by: FAMILY MEDICINE

## 2021-07-27 PROCEDURE — 99203 OFFICE O/P NEW LOW 30 MIN: CPT | Performed by: FAMILY MEDICINE

## 2021-07-27 RX ORDER — MELOXICAM 15 MG/1
15 TABLET ORAL DAILY
Qty: 14 TABLET | Refills: 0 | Status: SHIPPED | OUTPATIENT
Start: 2021-07-27 | End: 2022-04-14 | Stop reason: ALTCHOICE

## 2021-07-27 ASSESSMENT — ENCOUNTER SYMPTOMS
NAUSEA: 0
SORE THROAT: 0
COUGH: 0
ABDOMINAL PAIN: 0
SHORTNESS OF BREATH: 0

## 2021-07-27 ASSESSMENT — PATIENT HEALTH QUESTIONNAIRE - PHQ9
2. FEELING DOWN, DEPRESSED OR HOPELESS: 0
SUM OF ALL RESPONSES TO PHQ QUESTIONS 1-9: 0
SUM OF ALL RESPONSES TO PHQ QUESTIONS 1-9: 0
SUM OF ALL RESPONSES TO PHQ9 QUESTIONS 1 & 2: 0
1. LITTLE INTEREST OR PLEASURE IN DOING THINGS: 0
SUM OF ALL RESPONSES TO PHQ QUESTIONS 1-9: 0

## 2021-07-27 NOTE — PROGRESS NOTES
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 He had been seen in ER recently for knee pain and had been told to establish with PCP. Knee pain has been present for a couple of years - progressive over last 1 month. No injury or trauma. Reports painful crepitus recently. Pain is present all the time, but worse with more activity. Pain is worse for the L knee. No obvious swelling. Not using anything at home for pain. He currently works in [employer]. Would also like to complete wellness. In regards to his weight, when in [employer] reportedly weighed 185lb. Gained a lot of weight after leaving  and gained to 250lb. His current weight is heaviest he has ever been. PHQ Scores 7/27/2021   PHQ2 Score 0   PHQ9 Score 0     Interpretation of Total Score Depression Severity: 1-4 = Minimal depression, 5-9 = Mild depression, 10-14 = Moderate depression, 15-19 = Moderately severe depression, 20-27 = Severe depression     Prior to Visit Medications    Medication Sig Taking?  Authorizing Provider   ibuprofen (ADVIL;MOTRIN) 600 MG tablet Take 1 tablet by mouth every 6 hours as needed for Pain  Lesa Mata MD       Past Medical History:   Diagnosis Date    History of bipolar disorder & PTSD 7/27/2021    Mixed hyperlipidemia 7/29/2021        Social History     Tobacco Use    Smoking status: Never Smoker    Smokeless tobacco: Never Used   Substance Use Topics    Alcohol use: Yes     Comment: rare    Drug use: No        Past Surgical History:   Procedure Laterality Date    INGUINAL HERNIA REPAIR Bilateral     WISDOM TOOTH EXTRACTION          No Known Allergies     Family History   Adopted: Yes   Family history unknown: Yes        Patient's past medical history, surgical history, family history, medications, and allergies  were all reviewed and updated as appropriate today. Review of Systems   Constitutional: Negative for fever. HENT: Negative for ear pain and sore throat. Respiratory: Negative for cough and shortness of breath. Cardiovascular: Negative for chest pain. Gastrointestinal: Negative for abdominal pain and nausea. Musculoskeletal: Positive for arthralgias (bilateral knees). Negative for joint swelling. Skin: Negative for rash. Neurological: Negative for dizziness, weakness, numbness and headaches. Hematological: Negative for adenopathy. /87   Pulse 108   Temp 97 °F (36.1 °C)   Ht 5' 10\" (1.778 m)   Wt 297 lb (134.7 kg)   SpO2 98%   BMI 42.62 kg/m²      Physical Exam  Vitals reviewed. Constitutional:       General: He is not in acute distress. HENT:      Head: Normocephalic. Nose: Nose normal.      Mouth/Throat:      Mouth: Mucous membranes are moist.   Eyes:      Extraocular Movements: Extraocular movements intact. Pupils: Pupils are equal, round, and reactive to light. Cardiovascular:      Rate and Rhythm: Normal rate and regular rhythm. Heart sounds: No murmur heard. Pulmonary:      Effort: Pulmonary effort is normal.      Breath sounds: Normal breath sounds. No wheezing. Abdominal:      General: Bowel sounds are normal.      Palpations: Abdomen is soft. Tenderness: There is no abdominal tenderness. Musculoskeletal:      Cervical back: Neck supple. Right knee: Normal.      Left knee: Normal.   Lymphadenopathy:      Cervical: No cervical adenopathy. Skin:     General: Skin is warm and dry. Findings: No rash. Neurological:      Mental Status: He is alert and oriented to person, place, and time. Cranial Nerves: No cranial nerve deficit. Psychiatric:         Mood and Affect: Mood normal.         Behavior: Behavior is cooperative. Assessment:  Encounter Diagnoses   Name Primary?  Chronic pain of both knees Yes    Annual physical exam     History of hypertension     History of bipolar disorder & PTSD     Class 3 severe obesity due to excess calories without serious comorbidity with body mass index (BMI) of 40.0 to 44.9 in adult [facility])     Need for hepatitis C screening test        Plan:  1. Chronic pain of both knees  Patient to practice today reporting chronic bilateral knee pain without any trauma or injury in the past.  Pain apparently has worsened over the last 1 month. Patient finds it difficult to do any routine activity secondary to pain. He has not actively been treating his knee pain with anything in particular. I will send him for x-rays at this time. Discussed multiple possible etiologies with patient today including arthritis. I think a lot of this is related to his weight and inactivity. I would encourage patient to work on lifestyle modifications and increase his exercise. At this time I have provided a 2-week course of anti-inflammatories to be taken and we will reassess depending on x-ray results and how he is feeling following anti-inflammatory around. - XR KNEE LEFT (3 VIEWS); Future  - XR KNEE RIGHT (3 VIEWS); Future  - meloxicam (MOBIC) 15 MG tablet; Take 1 tablet by mouth daily for 14 days  Dispense: 14 tablet; Refill: 0    2. Annual physical exam  General wellness exam. Reviewed chart for past hx and updated today. Counseled on age appropriate health guidance and discussed screening recommendations. Vaccinations reviewed and discussed. All questions answered  - CBC Auto Differential; Future  - Comprehensive Metabolic Panel, Fasting; Future  - Lipid, Fasting; Future  - Hemoglobin A1C; Future  - TSH with Reflex; Future    3. History of hypertension  Borderline today.     4. History of bipolar disorder & PTSD    5. Class 3 severe obesity due to excess calories without serious comorbidity with body mass index (BMI) of 40.0 to 44.9 in adult [facility])  Patient was asked about his current diet and exercise habits, and personalized advice was provided regarding recommended lifestyle changes. Patient's comorbid health conditions associated with elevated BMI were discussed, including degenerative joint disease, as well as the likely benefits of weight loss. Based upon patient's motivation to change his behavior, the following plan was agreed upon to work toward a weight loss goal of 100 pounds: low carbohydrate diet, exercise for at least 30 minutes 4-5 days per week and patient is not ready to make any meaningul lifestyle changes at this time. Educational materials for  weight loss were provided. Patient will follow-up in 1 year(s) with PCP. Provider spent 2 minutes counseling patient. - Hemoglobin A1C; Future  - TSH with Reflex; Future    6. Need for hepatitis C screening test  Per recommendations issued by the [facility] and the [facility] for Disease Control and Prevention (CDC) in 2020 adults ? 25years of age be screened at least once for chronic HCV infection. Pt agreeable. No know risk of exposure in past or recent per pt. - Hepatitis C Antibody; Future      Return if symptoms worsen or fail to improve. Tong Hodge, DO     Please note that this chart was generated using dragon dictation software. Although every effort was made to ensure the accuracy of this automated transcription, some errors in transcription may have occurred.

## 2021-07-28 ENCOUNTER — HOSPITAL ENCOUNTER (OUTPATIENT)
Age: 41
Discharge: HOME | End: 2021-07-28
Payer: MEDICARE

## 2021-07-28 ENCOUNTER — HOSPITAL ENCOUNTER (OUTPATIENT)
Dept: GENERAL RADIOLOGY | Age: 41
Discharge: HOME | End: 2021-07-28
Payer: MEDICARE

## 2021-07-28 DIAGNOSIS — G89.29 CHRONIC PAIN OF BOTH KNEES: ICD-10-CM

## 2021-07-28 DIAGNOSIS — Z00.00 ANNUAL PHYSICAL EXAM: ICD-10-CM

## 2021-07-28 DIAGNOSIS — M25.561 CHRONIC PAIN OF BOTH KNEES: ICD-10-CM

## 2021-07-28 DIAGNOSIS — E66.01 CLASS 3 SEVERE OBESITY DUE TO EXCESS CALORIES WITHOUT SERIOUS COMORBIDITY WITH BODY MASS INDEX (BMI) OF 40.0 TO 44.9 IN ADULT (HCC): ICD-10-CM

## 2021-07-28 DIAGNOSIS — M25.562 CHRONIC PAIN OF BOTH KNEES: ICD-10-CM

## 2021-07-28 DIAGNOSIS — Z11.59 NEED FOR HEPATITIS C SCREENING TEST: ICD-10-CM

## 2021-07-28 LAB
A/G RATIO: 1.4 (ref 1.1–2.2)
ALBUMIN SERPL-MCNC: 4.4 G/DL (ref 3.4–5)
ALP BLD-CCNC: 65 U/L (ref 40–129)
ALT SERPL-CCNC: 40 U/L (ref 10–40)
ANION GAP SERPL CALCULATED.3IONS-SCNC: 13 MMOL/L (ref 3–16)
AST SERPL-CCNC: 35 U/L (ref 15–37)
BASOPHILS ABSOLUTE: 0.1 K/UL (ref 0–0.2)
BASOPHILS RELATIVE PERCENT: 0.9 %
BILIRUB SERPL-MCNC: 0.5 MG/DL (ref 0–1)
BUN BLDV-MCNC: 14 MG/DL (ref 7–20)
CALCIUM SERPL-MCNC: 9.6 MG/DL (ref 8.3–10.6)
CHLORIDE BLD-SCNC: 103 MMOL/L (ref 99–110)
CHOLESTEROL, FASTING: 236 MG/DL (ref 0–199)
CO2: 25 MMOL/L (ref 21–32)
CREAT SERPL-MCNC: 1 MG/DL (ref 0.9–1.3)
EOSINOPHILS ABSOLUTE: 0.1 K/UL (ref 0–0.6)
EOSINOPHILS RELATIVE PERCENT: 0.9 %
GFR AFRICAN AMERICAN: >60
GFR NON-AFRICAN AMERICAN: >60
GLOBULIN: 3.2 G/DL
GLUCOSE FASTING: 110 MG/DL (ref 70–99)
HCT VFR BLD CALC: 48.3 % (ref 40.5–52.5)
HDLC SERPL-MCNC: 44 MG/DL (ref 40–60)
HEMOGLOBIN: 16.7 G/DL (ref 13.5–17.5)
HEPATITIS C ANTIBODY INTERPRETATION: NORMAL
LDL CHOLESTEROL CALCULATED: 172 MG/DL
LYMPHOCYTES ABSOLUTE: 1.9 K/UL (ref 1–5.1)
LYMPHOCYTES RELATIVE PERCENT: 21.7 %
MCH RBC QN AUTO: 31.1 PG (ref 26–34)
MCHC RBC AUTO-ENTMCNC: 34.6 G/DL (ref 31–36)
MCV RBC AUTO: 89.8 FL (ref 80–100)
MONOCYTES ABSOLUTE: 0.7 K/UL (ref 0–1.3)
MONOCYTES RELATIVE PERCENT: 8.3 %
NEUTROPHILS ABSOLUTE: 5.9 K/UL (ref 1.7–7.7)
NEUTROPHILS RELATIVE PERCENT: 68.2 %
PDW BLD-RTO: 13.9 % (ref 12.4–15.4)
PLATELET # BLD: 231 K/UL (ref 135–450)
PMV BLD AUTO: 10 FL (ref 5–10.5)
POTASSIUM SERPL-SCNC: 4.4 MMOL/L (ref 3.5–5.1)
RBC # BLD: 5.38 M/UL (ref 4.2–5.9)
SODIUM BLD-SCNC: 141 MMOL/L (ref 136–145)
TOTAL PROTEIN: 7.6 G/DL (ref 6.4–8.2)
TRIGLYCERIDE, FASTING: 101 MG/DL (ref 0–150)
TSH REFLEX: 1.38 UIU/ML (ref 0.27–4.2)
VLDLC SERPL CALC-MCNC: 20 MG/DL
WBC # BLD: 8.6 K/UL (ref 4–11)

## 2021-07-28 PROCEDURE — 73562 X-RAY EXAM OF KNEE 3: CPT

## 2021-07-29 PROBLEM — E78.2 MIXED HYPERLIPIDEMIA: Status: ACTIVE | Noted: 2021-07-29

## 2021-07-29 LAB
ESTIMATED AVERAGE GLUCOSE: 102.5 MG/DL
HBA1C MFR BLD: 5.2 %

## 2022-04-06 ENCOUNTER — TELEPHONE (OUTPATIENT)
Dept: PRIMARY CARE CLINIC | Age: 42
End: 2022-04-06

## 2022-04-06 NOTE — TELEPHONE ENCOUNTER
Sebastian Lopes from [employer] calling to verify dx of arthritis of knees and dates of disablity.     Told him I need a records release first    He says he will fax it over

## 2022-04-14 ENCOUNTER — OFFICE VISIT (OUTPATIENT)
Dept: PRIMARY CARE CLINIC | Age: 42
End: 2022-04-14
Payer: COMMERCIAL

## 2022-04-14 VITALS
OXYGEN SATURATION: 97 % | DIASTOLIC BLOOD PRESSURE: 86 MMHG | WEIGHT: 303 LBS | HEIGHT: 70 IN | TEMPERATURE: 96.8 F | BODY MASS INDEX: 43.38 KG/M2 | SYSTOLIC BLOOD PRESSURE: 133 MMHG | HEART RATE: 94 BPM

## 2022-04-14 DIAGNOSIS — G89.29 CHRONIC PAIN OF BOTH KNEES: Primary | ICD-10-CM

## 2022-04-14 DIAGNOSIS — M25.562 CHRONIC PAIN OF BOTH KNEES: Primary | ICD-10-CM

## 2022-04-14 DIAGNOSIS — E66.01 CLASS 3 SEVERE OBESITY DUE TO EXCESS CALORIES WITHOUT SERIOUS COMORBIDITY WITH BODY MASS INDEX (BMI) OF 40.0 TO 44.9 IN ADULT (HCC): ICD-10-CM

## 2022-04-14 DIAGNOSIS — M25.561 CHRONIC PAIN OF BOTH KNEES: Primary | ICD-10-CM

## 2022-04-14 PROCEDURE — 1036F TOBACCO NON-USER: CPT | Performed by: FAMILY MEDICINE

## 2022-04-14 PROCEDURE — G8427 DOCREV CUR MEDS BY ELIG CLIN: HCPCS | Performed by: FAMILY MEDICINE

## 2022-04-14 PROCEDURE — 99213 OFFICE O/P EST LOW 20 MIN: CPT | Performed by: FAMILY MEDICINE

## 2022-04-14 PROCEDURE — G8417 CALC BMI ABV UP PARAM F/U: HCPCS | Performed by: FAMILY MEDICINE

## 2022-04-14 SDOH — ECONOMIC STABILITY: FOOD INSECURITY: WITHIN THE PAST 12 MONTHS, THE FOOD YOU BOUGHT JUST DIDN'T LAST AND YOU DIDN'T HAVE MONEY TO GET MORE.: NEVER TRUE

## 2022-04-14 SDOH — ECONOMIC STABILITY: FOOD INSECURITY: WITHIN THE PAST 12 MONTHS, YOU WORRIED THAT YOUR FOOD WOULD RUN OUT BEFORE YOU GOT MONEY TO BUY MORE.: NEVER TRUE

## 2022-04-14 ASSESSMENT — ENCOUNTER SYMPTOMS
SHORTNESS OF BREATH: 0
COUGH: 0
ABDOMINAL PAIN: 0
NAUSEA: 0
SORE THROAT: 0

## 2022-04-14 ASSESSMENT — SOCIAL DETERMINANTS OF HEALTH (SDOH): HOW HARD IS IT FOR YOU TO PAY FOR THE VERY BASICS LIKE FOOD, HOUSING, MEDICAL CARE, AND HEATING?: SOMEWHAT HARD

## 2022-04-14 NOTE — PROGRESS NOTES
[address] [facility] PRIMARY CARE  [address] [place] [id number]  Dept: [phone]  Dept Fax: [phone]     4/14/2022      Londa Harada   1980     Chief Complaint   Patient presents with    Follow-up       HPI  Pt comes in today for first visit since July 2021. At that time we had talked about knee pain and he went for knee xrays (below is result). There was never any f/u after that. He is here today asking for short term disability to be completed. He reported started new role with new employer 2 months ago. Current job is working on [employer], driving. Reports ongoing and worsening pain of R knee. Reports stopping work April 1 2/2 to pain in the knee. Has no set date of returning to work. Normal schedule is 8h shifts M-F. No new injury that took place from last visit together. 1. Minimal spurring of the medial tibial spine, mild early degenerative changes. Otherwise, medial, lateral and patellofemoral compartments are normal.   2. Normal left       PHQ Scores 7/27/2021   PHQ2 Score 0   PHQ9 Score 0     Interpretation of Total Score Depression Severity: 1-4 = Minimal depression, 5-9 = Mild depression, 10-14 = Moderate depression, 15-19 = Moderately severe depression, 20-27 = Severe depression     Prior to Visit Medications    Medication Sig Taking?  Authorizing Provider       Past Medical History:   Diagnosis Date    History of bipolar disorder & PTSD 7/27/2021    Mixed hyperlipidemia 7/29/2021        Social History     Tobacco Use    Smoking status: Never Smoker    Smokeless tobacco: Never Used   Substance Use Topics    Alcohol use: Yes     Comment: rare    Drug use: No        Past Surgical History:   Procedure Laterality Date    INGUINAL HERNIA REPAIR Bilateral     WISDOM TOOTH EXTRACTION          No Known Allergies     Family History   Adopted: Yes   Family history unknown: Yes        Patient's past medical history, surgical history, family history, medications, and allergies  were all reviewed and updated as appropriate today. Review of Systems   Constitutional: Negative for fever. HENT: Negative for ear pain and sore throat. Respiratory: Negative for cough and shortness of breath. Cardiovascular: Negative for chest pain. Gastrointestinal: Negative for abdominal pain and nausea. Musculoskeletal: Positive for arthralgias. Skin: Negative for rash. Neurological: Negative for dizziness and headaches. Hematological: Negative for adenopathy. /86   Pulse 94   Temp 96.8 °F (36 °C)   Ht 5' 10\" (1.778 m)   Wt (!) 303 lb (137.4 kg)   SpO2 97%   BMI 43.48 kg/m²      Physical Exam  Vitals reviewed. Constitutional:       General: He is not in acute distress. Appearance: He is obese. HENT:      Head: Normocephalic. Nose: Nose normal.      Mouth/Throat:      Mouth: Mucous membranes are moist.   Eyes:      Extraocular Movements: Extraocular movements intact. Pupils: Pupils are equal, round, and reactive to light. Cardiovascular:      Rate and Rhythm: Normal rate and regular rhythm. Heart sounds: No murmur heard. Pulmonary:      Effort: Pulmonary effort is normal.      Breath sounds: Normal breath sounds. No wheezing. Abdominal:      General: Bowel sounds are normal.      Palpations: Abdomen is soft. Tenderness: There is no abdominal tenderness. Musculoskeletal:         General: No swelling or deformity. Cervical back: Neck supple. Right knee: Normal.      Left knee: Normal.   Lymphadenopathy:      Cervical: No cervical adenopathy. Skin:     General: Skin is warm and dry. Findings: No rash. Neurological:      Mental Status: He is alert and oriented to person, place, and time. Cranial Nerves: No cranial nerve deficit. Psychiatric:         Mood and Affect: Mood normal.         Behavior: Behavior is cooperative. Assessment:  Encounter Diagnoses   Name Primary?     Chronic pain of both knees (R>L) Yes    Class 3 severe obesity due to excess calories without serious comorbidity with body mass index (BMI) of 40.0 to 44.9 in [facility])        Plan:  1. Chronic pain of both knees (R>L)  First visit in 9 months, wanting short term disability completed. Informed pt I will not be completing these with what information I have. His level of pain reported does not match with the evaluation we have completed, minimal arthritis seen on Xray in July 2021. At this time I have offered him a referral to Orthopedics for eval.   - [address] [facility]    2. Class 3 severe obesity due to excess calories without serious comorbidity with body mass index (BMI) of 40.0 to 44.9 in [facility])  Patient was asked about current diet and exercise habits, and personalized advice was provided regarding recommended lifestyle changes. Patient's comorbid health conditions associated with elevated BMI were discussed, as well as the likely benefits weight loss. Based upon patient's motivation to change behavior, the following plan was agreed upon to work toward a weight loss goal - increasing CV activity, reducing carbs/calories and healthy eating habits. Educational materials for weight loss were provided. Patient will follow-up with myself at designated time in future. Return if symptoms worsen or fail to improve. Ari Dolly, DO     Please note that this chart was generated using dragon dictation software. Although every effort was made to ensure the accuracy of this automated transcription, some errors in transcription may have occurred.

## 2022-04-14 NOTE — Clinical Note
Regina Keen, I see this pt is on your schedule for the end of this month. I had seen him once in July last year, got plain films, not terribly impressive findings. He showed up again today for first time since then with extensive short term disability forms with full functional assessment for an entirely new job he started 2 months and stopped going in 2 weeks ago 2/2 this pain to R knee. I told him I was not completing those forms and that he should get more eval done first. Just a heads up.  Thanks, Almita Frank

## 2022-04-26 ENCOUNTER — TELEPHONE (OUTPATIENT)
Dept: ORTHOPEDIC SURGERY | Age: 42
End: 2022-04-26

## 2022-04-26 ENCOUNTER — OFFICE VISIT (OUTPATIENT)
Dept: ORTHOPEDIC SURGERY | Age: 42
End: 2022-04-26
Payer: COMMERCIAL

## 2022-04-26 VITALS — BODY MASS INDEX: 43.38 KG/M2 | WEIGHT: 303 LBS | HEIGHT: 70 IN

## 2022-04-26 DIAGNOSIS — M25.561 RIGHT KNEE PAIN, UNSPECIFIED CHRONICITY: ICD-10-CM

## 2022-04-26 DIAGNOSIS — M25.562 LEFT KNEE PAIN, UNSPECIFIED CHRONICITY: Primary | ICD-10-CM

## 2022-04-26 PROCEDURE — G8427 DOCREV CUR MEDS BY ELIG CLIN: HCPCS | Performed by: ORTHOPAEDIC SURGERY

## 2022-04-26 PROCEDURE — L1812 KO ELASTIC W/JOINTS PRE OTS: HCPCS | Performed by: ORTHOPAEDIC SURGERY

## 2022-04-26 PROCEDURE — 99203 OFFICE O/P NEW LOW 30 MIN: CPT | Performed by: ORTHOPAEDIC SURGERY

## 2022-04-26 PROCEDURE — 1036F TOBACCO NON-USER: CPT | Performed by: ORTHOPAEDIC SURGERY

## 2022-04-26 PROCEDURE — G8417 CALC BMI ABV UP PARAM F/U: HCPCS | Performed by: ORTHOPAEDIC SURGERY

## 2022-04-26 ASSESSMENT — ENCOUNTER SYMPTOMS
COUGH: 0
SORE THROAT: 0
SHORTNESS OF BREATH: 0
NAUSEA: 0
ABDOMINAL PAIN: 0

## 2022-04-26 NOTE — TELEPHONE ENCOUNTER
Faxed completed aps to [facility] @ [phone]    Received disability paperwork to complete. The restrictions portion was completed so waiting to verify any restrictions.

## 2022-04-26 NOTE — PROGRESS NOTES
[address] PRIMARY CARE  [address] [facility] [address] [address]  Dept: [phone]  Dept Fax: [phone]     4/26/2022      Megan Lyn   1980     Chief Complaint   Patient presents with    Knee Pain     BILATERAL KNEE     Currently:     His work is with [employer] for the past 2 months. Was having pain in the right knee without trauma. Irritation with use of stairs. He has a history of having pain in the right knee that extended out of her several years preceding his appointment with [facility] include 234 beds at the [facility]. His pain appears to be in the medial aspect of the knee and the anterior aspect of the pain is worse with driving. 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 He has difficulty pushing and pulling heavy objects up a ramp apparently with [facility] include 234 beds at the [facility] [place] airport. Currently is not using any medications for his knee he is not using a brace has not been involved in any physical therapy efforts at all. He states he has gained approximately 60 to 70 pounds over the course of the last 12 months. He attributes this to her PTSD as well as depression. He was an ex [employer] and was discharged prior to his 4-year kamla  He is pending wedding preparations for this upcoming October            Previously:    HPI  Pt comes in today for first visit since July 2021. At that time we had talked about knee pain and he went for knee xrays (below is result). There was never any f/u after that. He is here today asking for short term disability to be completed. He reported started new role with new employer 2 months ago. Current job is working on [employer], driving. Reports ongoing and worsening pain of R knee. Reports stopping work April 1 2/2 to pain in the knee. Has no set date of returning to work. Normal schedule is 8h shifts M-F. No new injury that took place from last visit together. 1. Minimal spurring of the medial tibial spine, mild early degenerative changes.  Otherwise, medial, lateral and patellofemoral compartments are normal.   2. Normal left       PHQ Scores 7/27/2021   PHQ2 Score 0   PHQ9 Score 0     Interpretation of Total Score Depression Severity: 1-4 = Minimal depression, 5-9 = Mild depression, 10-14 = Moderate depression, 15-19 = Moderately severe depression, 20-27 = Severe depression     Prior to Visit Medications    Medication Sig Taking? Authorizing Provider       Past Medical History:   Diagnosis Date    History of bipolar disorder & PTSD 7/27/2021    Mixed hyperlipidemia 7/29/2021        Social History     Tobacco Use    Smoking status: Never Smoker    Smokeless tobacco: Never Used   Substance Use Topics    Alcohol use: Yes     Comment: rare    Drug use: No        Past Surgical History:   Procedure Laterality Date    INGUINAL HERNIA REPAIR Bilateral     WISDOM TOOTH EXTRACTION          No Known Allergies     Family History   Adopted: Yes   Family history unknown: Yes        Patient's past medical history, surgical history, family history, medications, and allergies  were all reviewed and updated as appropriate today. Review of Systems   Constitutional: Negative for fever. HENT: Negative for ear pain and sore throat. Respiratory: Negative for cough and shortness of breath. Cardiovascular: Negative for chest pain. Gastrointestinal: Negative for abdominal pain and nausea. Musculoskeletal: Positive for arthralgias. Skin: Negative for rash. Neurological: Negative for dizziness and headaches. Hematological: Negative for adenopathy. Ht 5' 10\" (1.778 m)   Wt (!) 303 lb (137.4 kg)   BMI 43.48 kg/m²      Physical Exam  Vitals reviewed. Constitutional:       General: He is not in acute distress. Appearance: He is obese. HENT:      Head: Normocephalic. Nose: Nose normal.      Mouth/Throat:      Mouth: Mucous membranes are moist.   Eyes:      Extraocular Movements: Extraocular movements intact.       Pupils: Pupils are equal, round, and reactive to light. Cardiovascular:      Rate and Rhythm: Normal rate and regular rhythm. Heart sounds: No murmur heard. Pulmonary:      Effort: Pulmonary effort is normal.      Breath sounds: Normal breath sounds. No wheezing. Abdominal:      General: Bowel sounds are normal.      Palpations: Abdomen is soft. Tenderness: There is no abdominal tenderness. Musculoskeletal:         General: No swelling or deformity. Cervical back: Neck supple. Right knee: Normal.      Left knee: Normal.   Lymphadenopathy:      Cervical: No cervical adenopathy. Skin:     General: Skin is warm and dry. Findings: No rash. Neurological:      Mental Status: He is alert and oriented to person, place, and time. Cranial Nerves: No cranial nerve deficit. Psychiatric:         Mood and Affect: Mood normal.         Behavior: Behavior is cooperative. Pain in the right knee medial   Walking difficulty with flexion of the knees bilaterally. Flexion of the back with standing and walking. Marked hyperextension of the low back with evidence for walking difficulty   Weakness of hip flexion on the right side which is unexplained no signs of any radiculopathy but hamstring tightness is noted right greater than left there is also evidence of walking difficulty in which she stands and walks almost in a crouched gait it is unclear as to whether he is spasticity in the lower extremity or whether he had this as a child but he states that this was not the case nor did he prevent any active  duty activities    Hip range of motion is comfortable knee range of motion is full extension to 115 degrees bilaterally there is no major pseudolaxity but obvious osteophytes are noted on the medial aspect of the right knee and minimal pseudolaxity and no evidence of any patellofemoral pain despite obvious patellofemoral osteophytes        Assessment:  Encounter Diagnoses   Name Primary?     Left knee pain, unspecified chronicity Yes    Right knee pain, unspecified chronicity        Plan:  1. Chronic pain of both knees (R>L)     2. Class 3 severe obesity due to excess calories without serious comorbidity with body mass index (BMI) of 40.0 to 44.9 in adult [facility])  Patient was asked about current diet and exercise habits, and personalized advice was provided regarding recommended lifestyle changes. Patient's comorbid health conditions associated with elevated BMI were discussed, as well as the likely benefits weight loss. Based upon patient's motivation to change behavior, the following plan was agreed upon to work toward a weight loss goal - increasing CV activity, reducing carbs/calories and healthy eating habits. Educational materials for weight loss were provided. Patient will follow-up with myself at designated time in future. 3. Plan for progression to physical therapy. Diclofenac was ordered to see if this can address some of his local pain in addition to topical anti-inflammatory medications. We have given him a brace for his right knee to see if this can address some of his localized pain medially and hopefully with physical therapy gait and posture mechanics can be addressed as well.   He is not having any distinct back pain but back may be the origin for some of his altered gait patterns we will continue to follow him and see how he progresses with a list of local pain issues with medications and therapy but could offer also injections to see if this can reduce his current pain complaints and allow him to return to work

## 2022-04-29 ENCOUNTER — TELEPHONE (OUTPATIENT)
Dept: ORTHOPEDIC SURGERY | Age: 42
End: 2022-04-29

## 2022-04-29 NOTE — TELEPHONE ENCOUNTER
Faxed completed aps to [facility] with [address] @ [phone]    Claim # [id number]

## 2022-05-12 ENCOUNTER — OFFICE VISIT (OUTPATIENT)
Dept: ORTHOPEDIC SURGERY | Age: 42
End: 2022-05-12
Payer: COMMERCIAL

## 2022-05-12 VITALS — BODY MASS INDEX: 43.38 KG/M2 | HEIGHT: 70 IN | WEIGHT: 303 LBS

## 2022-05-12 DIAGNOSIS — M25.562 LEFT KNEE PAIN, UNSPECIFIED CHRONICITY: ICD-10-CM

## 2022-05-12 DIAGNOSIS — M25.561 RIGHT KNEE PAIN, UNSPECIFIED CHRONICITY: Primary | ICD-10-CM

## 2022-05-12 PROCEDURE — G8427 DOCREV CUR MEDS BY ELIG CLIN: HCPCS | Performed by: PHYSICIAN ASSISTANT

## 2022-05-12 PROCEDURE — G8417 CALC BMI ABV UP PARAM F/U: HCPCS | Performed by: PHYSICIAN ASSISTANT

## 2022-05-12 PROCEDURE — 1036F TOBACCO NON-USER: CPT | Performed by: PHYSICIAN ASSISTANT

## 2022-05-12 PROCEDURE — 99214 OFFICE O/P EST MOD 30 MIN: CPT | Performed by: PHYSICIAN ASSISTANT

## 2022-05-12 RX ORDER — TIZANIDINE 4 MG/1
2 TABLET ORAL 3 TIMES DAILY PRN
Qty: 30 TABLET | Refills: 0 | Status: SHIPPED | OUTPATIENT
Start: 2022-05-12

## 2022-05-12 ASSESSMENT — ENCOUNTER SYMPTOMS
SORE THROAT: 0
NAUSEA: 0
COUGH: 0
ABDOMINAL PAIN: 0
SHORTNESS OF BREATH: 0

## 2022-05-12 NOTE — PROGRESS NOTES
[address] [facility] [address] PRIMARY CARE  [address] [place] [id number]  Dept: [phone]  Dept Fax: [phone]     5/12/2022      Lawrence Villarreal   1980     Chief Complaint   Patient presents with    Knee Pain     RIGHT KNEE     Currently: 5/12/22  Patient is here for follow-up regarding bilateral knee pain notes significant improvements in regards to pain still has some pain along the medial aspect of the knee but notes it is much better and pain is only really severe when it pops which only occurs occasionally with certain motions pain lasts for about 10 to 15 minutes then it fades away to a 1-2 out of 10. He intermittently utilizes diclofenac as well as the brace to help with stabilization but notes that he has not had much need for the brace as it feels much more stable now and the pain is much better controlled. He still notes significant tightness to musculature around the knee. And denies numbness burning tingling radiating pains down the leg denies any further fall trauma or injury. Hopeful for continued improvement and discussion regarding physical therapy that was mentioned previously. Previously: 4/26/2022  His work is with [employer] for the past 2 months. Was having pain in the right knee without trauma. Irritation with use of stairs. He has a history of having pain in the right knee that extended out of her several years preceding his appointment with [facility]. His pain appears to be in the medial aspect of the knee and the anterior aspect of the pain is worse with driving. 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 He has difficulty pushing and pulling heavy objects up [place] ramp apparently with [place] airport. Currently is not using any medications for his knee he is not using a brace has not been involved in any physical therapy efforts at all. He states he has gained approximately 60 to 70 pounds over the course of the last 12 months.   He attributes this to her PTSD as well as depression. 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 At that time we had talked about knee pain and he went for knee xrays (below is result). There was never any f/u after that. He is here today asking for short term disability to be completed. He reported started new role with new employer 2 months ago. Current job is working on [employer], driving. Reports ongoing and worsening pain of R knee. Reports stopping work April 1 2/2 to pain in the knee. Has no set date of returning to work. Normal schedule is 8h shifts M-F. No new injury that took place from last visit together. 1. Minimal spurring of the medial tibial spine, mild early degenerative changes. Otherwise, medial, lateral and patellofemoral compartments are normal.   2. Normal left       PHQ Scores 7/27/2021   PHQ2 Score 0   PHQ9 Score 0     Interpretation of Total Score Depression Severity: 1-4 = Minimal depression, 5-9 = Mild depression, 10-14 = Moderate depression, 15-19 = Moderately severe depression, 20-27 = Severe depression     Prior to Visit Medications    Medication Sig Taking?  Authorizing Provider       Past Medical History:   Diagnosis Date    History of bipolar disorder & PTSD 7/27/2021    Mixed hyperlipidemia 7/29/2021        Social History     Tobacco Use    Smoking status: Never Smoker    Smokeless tobacco: Never Used   Substance Use Topics    Alcohol use: Yes     Comment: rare    Drug use: No        Past Surgical History:   Procedure Laterality Date    INGUINAL HERNIA REPAIR Bilateral     WISDOM TOOTH EXTRACTION          No Known Allergies     Family History   Adopted: Yes   Family history unknown: Yes        Patient's past medical history, surgical history, family history, medications, and allergies  were all reviewed and updated as appropriate today. Review of Systems   Constitutional: Negative for fever. HENT: Negative for ear pain and sore throat. Respiratory: Negative for cough and shortness of breath. Cardiovascular: Negative for chest pain. Gastrointestinal: Negative for abdominal pain and nausea. Musculoskeletal: Positive for arthralgias. Skin: Negative for rash. Neurological: Negative for dizziness and headaches. Hematological: Negative for adenopathy. Ht 5' 10\" (1.778 m)   Wt (!) 303 lb (137.4 kg)   BMI 43.48 kg/m²      Physical Exam  Vitals reviewed. Constitutional:       General: He is not in acute distress. Appearance: He is obese. HENT:      Head: Normocephalic. Nose: Nose normal.      Mouth/Throat:      Mouth: Mucous membranes are moist.   Eyes:      Extraocular Movements: Extraocular movements intact. Pupils: Pupils are equal, round, and reactive to light. Cardiovascular:      Rate and Rhythm: Normal rate and regular rhythm. Heart sounds: No murmur heard. Pulmonary:      Effort: Pulmonary effort is normal.      Breath sounds: Normal breath sounds. No wheezing. Abdominal:      General: Bowel sounds are normal.      Palpations: Abdomen is soft. Tenderness: There is no abdominal tenderness. Musculoskeletal:         General: No swelling or deformity. Cervical back: Neck supple. Right knee: Normal.      Left knee: Normal.   Lymphadenopathy:      Cervical: No cervical adenopathy. Skin:     General: Skin is warm and dry. Findings: No rash. Neurological:      Mental Status: He is alert and oriented to person, place, and time. Cranial Nerves: No cranial nerve deficit. Psychiatric:         Mood and Affect: Mood normal.         Behavior: Behavior is cooperative. Pain in the right knee medial   Walking difficulty with flexion of the knees bilaterally. Flexion of the back with standing and walking.  Marked hyperextension of the low back with evidence for walking difficulty   Weakness of hip flexion on the right side which is unexplained no signs of any radiculopathy but significant hamstring tightness is noted right greater than left there is also evidence of walking difficulty in which she stands and walks almost in a crouched gait it is unclear as to whether he is spasticity in the lower extremity or whether he had this as a child but he states that this was not the case nor did he prevent any active  duty activities    Hip range of motion is comfortable knee range of motion is full extension to 115 degrees bilaterally there is no major pseudolaxity but obvious osteophytes are noted on the medial aspect of the right knee and minimal pseudolaxity and no evidence of any patellofemoral pain despite obvious patellofemoral osteophytes        Assessment:  Encounter Diagnoses   Name Primary?  Right knee pain, unspecified chronicity Yes    Left knee pain, unspecified chronicity        Plan:  1. Chronic pain of both knees (R>L)     2. Class 3 severe obesity due to excess calories without serious comorbidity with body mass index (BMI) of 40.0 to 44.9 in adult [facility])  Patient was asked about current diet and exercise habits, and personalized advice was provided regarding recommended lifestyle changes. Patient's comorbid health conditions associated with elevated BMI were discussed, as well as the likely benefits weight loss. Based upon patient's motivation to change behavior, the following plan was agreed upon to work toward a weight loss goal - increasing CV activity, reducing carbs/calories and healthy eating habits. Educational materials for weight loss were provided. Patient will follow-up with myself at designated time in future. 3. Plan for progression to physical therapy. Diclofenac was ordered to see if this can address some of his local pain in addition to topical anti-inflammatory medications.   We have given him a brace for his right knee to see if this can address some of his localized pain medially and hopefully with physical therapy gait and posture mechanics can be addressed as well. He is not having any distinct back pain but back may be the origin for some of his altered gait patterns we will continue to follow him and see how he progresses with a list of local pain issues with medications and therapy but could offer also injections to see if this can reduce his current pain complaints and allow him to return to work     4. continue current restrictions till the first of June to allow for physical therapy to allow for full healing and recovery. Plan for follow-up appointment on May 31 for reevaluation to ensure appropriate return to work plan and pursuit of physical therapy    5. gave script for physical therapy for eval and treatment of bilateral knees with focus to hamstring loosening consider dry needling     6.  Gave script for tizanidine 2mg tid prn muscle spasm #30 0 refills

## 2022-05-17 ENCOUNTER — TELEPHONE (OUTPATIENT)
Dept: ORTHOPEDIC SURGERY | Age: 42
End: 2022-05-17

## 2022-06-03 ENCOUNTER — TELEPHONE (OUTPATIENT)
Dept: ORTHOPEDIC SURGERY | Age: 42
End: 2022-06-03

## 2022-06-03 NOTE — TELEPHONE ENCOUNTER
Patient was a no show for last appointment. LM for the patient stating that he needs to be seen before paperwork will be completed. Waiting for a reply regarding 5/31/2022 dictation.

## 2022-07-31 ENCOUNTER — HOSPITAL ENCOUNTER (EMERGENCY)
Age: 42
Discharge: HOME | End: 2022-07-31
Attending: EMERGENCY MEDICINE

## 2022-07-31 VITALS
HEART RATE: 99 BPM | WEIGHT: 308.25 LBS | HEIGHT: 69 IN | DIASTOLIC BLOOD PRESSURE: 86 MMHG | RESPIRATION RATE: 16 BRPM | SYSTOLIC BLOOD PRESSURE: 150 MMHG | TEMPERATURE: 98.1 F | OXYGEN SATURATION: 98 % | BODY MASS INDEX: 45.66 KG/M2

## 2022-07-31 DIAGNOSIS — Z87.898 HISTORY OF VOMITING: Primary | ICD-10-CM

## 2022-07-31 PROCEDURE — 99282 EMERGENCY DEPT VISIT SF MDM: CPT

## 2022-07-31 ASSESSMENT — PAIN - FUNCTIONAL ASSESSMENT: PAIN_FUNCTIONAL_ASSESSMENT: NONE - DENIES PAIN

## 2022-07-31 NOTE — DISCHARGE INSTRUCTIONS
Push fluids. Follow more of a clear liquid diet. Avoid spicy foods. Follow-up with your primary care doctor as needed.

## 2022-07-31 NOTE — Clinical Note
Danyell Neri was seen and treated in our emergency department on 7/31/2022. He may return to work on 08/01/2022. If you have any questions or concerns, please don't hesitate to call.       Vika Santos MD

## 2022-07-31 NOTE — ED PROVIDER NOTES
TRIAGE CHIEF COMPLAINT:   Chief Complaint   Patient presents with    Letter for School/Work     Reports woke up with [place] called off feels better now needs work note          HPI: Deborah Bryson is a 38 y.o. male who presents to the Emergency Department with complaint of needing a note to return to work tomorrow. He ate some spicy food last night and this morning woke up with nausea and vomiting with some abdominal discomfort. There was no diarrhea. He states he feels completely normal now. Denies current nausea. No fever or chills. No urinary symptoms. He states he missed work today and needs a note to return tomorrow. REVIEW OF SYSTEMS:  6 systems reviewed. Pertinent positives per HPI. Otherwise noted to be negative. Nursing notes reviewed and agree with above. Past medical/surgical history reviewed. MEDICATIONS   Patient's Medications   New Prescriptions    No medications on file   Previous Medications    DICLOFENAC (VOLTAREN) 50 MG EC TABLET    Take 1 tablet by mouth 2 times daily (with meals)    TIZANIDINE (ZANAFLEX) 4 MG TABLET    Take 0.5 tablets by mouth 3 times daily as needed (muscle spasm)   Modified Medications    No medications on file   Discontinued Medications    No medications on file         ALLERGIES No Known Allergies      BP (!) 150/86   Pulse 99   Temp 98.1 °F (36.7 °C) (Oral)   Resp 16   Ht 5' 9\" (1.753 m)   Wt (!) 308 lb 4 oz (139.8 kg)   SpO2 98%   BMI 45.52 kg/m²   General:  No acute distress. Non toxic appearance  Head:   Normocephalic and atraumatic  Eyes:   Conjunctiva clear, MONAE, EOM's intact. Sclera anicteric. ENT:   Mucous membranes moist  Neck:   Supple. No adenopathy. Lungs/Chest:  No respiratory distress  CVS:   Regular rate and rhythm  Abdomen: Bowel sounds normal.  Soft and nontender  Extremities:  Full range of motion  Skin:   No rashes or lesions to exposed skin  Back:   No CVA tenderness  Neuro:  Alert and OX3. Speech clear and appropriate.  No upper/lower extremity weakness. Normal sensation in all extremities. No facial asymmetry or weakness. Gait normal.  Psych:   Affect normal. Mood normal        RADIOLOGY:      LAB      ED COURSE / MDM:  26-year-old male with complaints of waking up this morning with nausea and vomiting that caused him to miss work presents here for a work note. He is asymptomatic currently. Abdomen is benign. Bowel sounds are normal.  He declines any prescription medication. He was given a note to return to work tomorrow. I discussed with Felipe Altamirano the results of the evaluation in the Emergency Department, diagnosis, care, prognosis and the importance of follow-up. The patient is stable for discharge. The patient and/or family are in agreement with the plan and all questions have been answered. Specific discharge instructions were explained, including reasons to return to the emergency department.       (Please note that portions of this note may have been completed with a voice recognition program.  Efforts were made to edit the dictation but occasionally words are mis-transcribed)        FINAL IMPRESSION:  1 --history of vomiting                    Ashok Zeng MD  07/31/22 [id number]

## 2022-12-14 ENCOUNTER — HOSPITAL ENCOUNTER (EMERGENCY)
Age: 42
Discharge: HOME | End: 2022-12-14
Attending: EMERGENCY MEDICINE

## 2022-12-14 VITALS
BODY MASS INDEX: 43.04 KG/M2 | TEMPERATURE: 97.8 F | HEIGHT: 69 IN | RESPIRATION RATE: 18 BRPM | HEART RATE: 96 BPM | OXYGEN SATURATION: 96 % | DIASTOLIC BLOOD PRESSURE: 93 MMHG | SYSTOLIC BLOOD PRESSURE: 165 MMHG | WEIGHT: 290.56 LBS

## 2022-12-14 DIAGNOSIS — J06.9 VIRAL URI WITH COUGH: Primary | ICD-10-CM

## 2022-12-14 DIAGNOSIS — R03.0 ELEVATED BLOOD PRESSURE READING: ICD-10-CM

## 2022-12-14 PROCEDURE — 99283 EMERGENCY DEPT VISIT LOW MDM: CPT

## 2022-12-14 RX ORDER — DEXTROMETHORPHAN POLISTIREX 30 MG/5ML
60 SUSPENSION ORAL 2 TIMES DAILY PRN
Qty: 89 ML | Refills: 1 | Status: SHIPPED | OUTPATIENT
Start: 2022-12-14 | End: 2022-12-21

## 2022-12-14 NOTE — ED PROVIDER NOTES
TRIAGE CHIEF COMPLAINT:   Chief Complaint   Patient presents with    Cough         HPI: Zhou Lynch is a 38 y.o. male who presents to the Emergency Department with complaint of cough that started 2 days ago. He is coughing up some yellow and green sputum. He has some nasal and head congestion. No sore throat or ear pain. Denies chest pain or shortness of breath. No fever or chills. He has taken some DayQuil without any significant relief. Patient states he missed work today and needs a note to return to work. He states he is not concerned about COVID. He has been vaccinated for COVID. REVIEW OF SYSTEMS:  6 systems reviewed. Pertinent positives per HPI. Otherwise noted to be negative. Nursing notes reviewed and agree with above. Past medical/surgical history reviewed. MEDICATIONS   Patient's Medications   New Prescriptions    DEXTROMETHORPHAN (DELSYM) 30 MG/5ML EXTENDED RELEASE LIQUID    Take 10 mLs by mouth 2 times daily as needed for Cough   Previous Medications    No medications on file   Modified Medications    No medications on file   Discontinued Medications    DICLOFENAC (VOLTAREN) 50 MG EC TABLET    Take 1 tablet by mouth 2 times daily (with meals)    TIZANIDINE (ZANAFLEX) 4 MG TABLET    Take 0.5 tablets by mouth 3 times daily as needed (muscle spasm)         ALLERGIES No Known Allergies      BP (!) 165/93   Pulse 96   Temp 97.8 °F (36.6 °C) (Oral)   Resp 18   Ht 5' 8.5\" (1.74 m)   Wt 290 lb 9 oz (131.8 kg)   SpO2 96%   BMI 43.54 kg/m²   General:  No acute distress. Non toxic appearance  Head:   Normocephalic and atraumatic  Eyes:   Conjunctiva clear, MONAE, EOM's intact. Sclera anicteric. ENT:   Mucous membranes moist.  TMs are normal.  Moderate cerumen noted. No sinus tenderness. Oropharynx is clear without redness swelling or exudate. Neck:   Supple. No adenopathy. Lungs/Chest:  No respiratory distress no cough. Lungs are clear bilaterally.   CVS:   Regular rate and rhythm  Abdomen:  Nontender  Extremities:  Full range of motion  Skin:   No rashes or lesions to exposed skin  Back:   Nontender  Neuro:  Alert and OX3. Speech clear and appropriate. No upper/lower extremity weakness. Normal sensation in all extremities. No facial asymmetry or weakness. Gait normal.  Psych:   Affect normal. Mood normal        RADIOLOGY:      LAB      ED COURSE / MDM:  49-year-old male with 2-day history of viral URI with cough symptoms without fever, chest pain or shortness of breath. He missed work today and is here for a work note. He is not concerned about COVID and declines testing. He is afebrile with normal room air sat. Blood pressure was elevated at 165/93. There is no clinical evidence for pneumonia, acute bacterial sinusitis, otitis media or strep throat. Recommended symptomatic treatment with ibuprofen for pain and Delsym for cough. Advise increase fluids by mouth. Recommended follow-up with primary care. He was given a note to return to work on Friday. I discussed with Steffanie Campbell the results of the evaluation in the Emergency Department, diagnosis, care, prognosis and the importance of follow-up. The patient is stable for discharge. The patient and/or family are in agreement with the plan and all questions have been answered. Specific discharge instructions were explained, including reasons to return to the emergency department.       (Please note that portions of this note may have been completed with a voice recognition program.  Efforts were made to edit the dictation but occasionally words are mis-transcribed)        FINAL IMPRESSION:  1 --viral URI with cough  2 --   elevated blood pressure reading                Matias Maher MD  12/14/22 [id number]

## 2022-12-14 NOTE — Clinical Note
Lilliana Crenshaw was seen and treated in our emergency department on 12/14/2022. He may return to work on 12/16/2022. If you have any questions or concerns, please don't hesitate to call.       Chelsey Avila MD

## 2022-12-14 NOTE — DISCHARGE INSTRUCTIONS
Fluids. Gargle with warm salt water if needed for sore throat. Take the Delsym every 12 hours if needed for cough. Use over-the-counter ibuprofen 600 mg 3 times daily with food if needed for pain/fever. Follow-up with your primary care doctor.

## 2023-05-13 ENCOUNTER — HOSPITAL ENCOUNTER (EMERGENCY)
Facility: HOSPITAL | Age: 43
Discharge: HOME | End: 2023-05-13
Attending: STUDENT IN AN ORGANIZED HEALTH CARE EDUCATION/TRAINING PROGRAM

## 2023-05-13 ENCOUNTER — APPOINTMENT (OUTPATIENT)
Facility: HOSPITAL | Age: 43
End: 2023-05-13

## 2023-05-13 VITALS
SYSTOLIC BLOOD PRESSURE: 138 MMHG | HEIGHT: 68 IN | BODY MASS INDEX: 43.95 KG/M2 | RESPIRATION RATE: 18 BRPM | DIASTOLIC BLOOD PRESSURE: 72 MMHG | TEMPERATURE: 98.1 F | OXYGEN SATURATION: 98 % | HEART RATE: 95 BPM | WEIGHT: 290 LBS

## 2023-05-13 DIAGNOSIS — M25.561 RIGHT KNEE PAIN, UNSPECIFIED CHRONICITY: ICD-10-CM
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
## 2023-05-13 PROCEDURE — 73562 X-RAY EXAM OF KNEE 3: CPT

## 2023-05-13 PROCEDURE — 99283 EMERGENCY DEPT VISIT LOW MDM: CPT

## 2023-05-13 ASSESSMENT — ENCOUNTER SYMPTOMS
SHORTNESS OF BREATH: 0
ABDOMINAL PAIN: 0
COUGH: 0
TROUBLE SWALLOWING: 0

## 2023-05-13 NOTE — ED TRIAGE NOTES
Pt arrives from [address] via EMS with cc of right knee pain. He was walking along [address] when his right knee just gave out. Still able to bear weight. No head injury or LOC.

## 2023-05-13 NOTE — ED PROVIDER NOTES
[facility] EMERGENCY DEP  EMERGENCY DEPARTMENT ENCOUNTER      Pt Name: Nasir Renteria  MRN: [id number]  Nonigfdouglas 1980  Date of evaluation: 5/13/2023  Provider: JOÃO Castellano - NP    [address] [place] [address]       Chief Complaint   Patient presents with    Knee Pain         HISTORY OF PRESENT ILLNESS   (Location/Symptom, Timing/Onset, Context/Setting, Quality, Duration, Modifying Factors, Severity)  Note limiting factors. HPI  Patient is a 26-year-old male with past medical history sniffing and for chronic knee pain and GERD who presents to the ED via EMS after taking a fall while walking with his luggage on a wet street. He states that he twisted his right knee and now has pain with weightbearing. He denies any LOC, head injury or neck injury. Skin integrity is intact. There is no obvious bony or soft tissue deformity. Good neurovascular sensation. No obvious joint effusion or joint instability. Pain increases with weight bearing; flexion and extension. He has not had any medications today prior to arrival.  Old charts reviewed. Review of External Medical Records:     Nursing Notes were reviewed. REVIEW OF SYSTEMS    (2-9 systems for level 4, 10 or more for level 5)     Review of Systems   Constitutional:  Negative for activity change, appetite change, fever and unexpected weight change. HENT:  Negative for congestion and trouble swallowing. Eyes:  Negative for visual disturbance. Respiratory:  Negative for cough and shortness of breath. Cardiovascular:  Negative for chest pain, palpitations and leg swelling. Gastrointestinal:  Negative for abdominal pain. Genitourinary:  Negative for flank pain. Musculoskeletal:  Positive for arthralgias. Negative for joint swelling. Skin:  Negative for rash. Neurological:  Negative for headaches. All other systems reviewed and are negative. Except as noted above the remainder of the review of systems was reviewed and negative.        PAST MEDICAL

## 2023-05-13 NOTE — CARE COORDINATION
5:27 PM  CM consulted. Informed patient in need of transportation to [facility]. Patient is ambulatory. Referral placed to Round Trip. ETA requested 5:35pm.  Patient to be picked up at ED Entrance.    to contact patient registration in route and upon arrival.    CRESCENCIO Meneses/RODOLFO    [phone]

## 2023-10-12 ENCOUNTER — HOSPITAL ENCOUNTER (EMERGENCY)
Age: 43
Discharge: HOME | End: 2023-10-12
Attending: EMERGENCY MEDICINE

## 2023-10-12 VITALS
BODY MASS INDEX: 43.87 KG/M2 | TEMPERATURE: 97.8 F | RESPIRATION RATE: 18 BRPM | SYSTOLIC BLOOD PRESSURE: 130 MMHG | OXYGEN SATURATION: 97 % | HEIGHT: 69 IN | WEIGHT: 296.19 LBS | DIASTOLIC BLOOD PRESSURE: 107 MMHG | HEART RATE: 113 BPM

## 2023-10-12 DIAGNOSIS — R09.81 NASAL SINUS CONGESTION: Primary | ICD-10-CM

## 2023-10-12 PROCEDURE — 99283 EMERGENCY DEPT VISIT LOW MDM: CPT

## 2023-10-12 RX ORDER — CETIRIZINE HYDROCHLORIDE, PSEUDOEPHEDRINE HYDROCHLORIDE 5; 120 MG/1; MG/1
1 TABLET, FILM COATED, EXTENDED RELEASE ORAL 2 TIMES DAILY
Qty: 60 TABLET | Refills: 0 | Status: SHIPPED | OUTPATIENT
Start: 2023-10-12 | End: 2023-11-11

## 2023-10-12 ASSESSMENT — PAIN - FUNCTIONAL ASSESSMENT
PAIN_FUNCTIONAL_ASSESSMENT: NONE - DENIES PAIN
PAIN_FUNCTIONAL_ASSESSMENT: NONE - DENIES PAIN

## 2023-10-12 NOTE — DISCHARGE INSTRUCTIONS
Please hold sinus decongestant medication if your blood pressure is running greater than 140/100. As it may further elevate your blood pressure.

## 2023-10-12 NOTE — ED NOTES
Patient given prescription, work note, discharge instructions verbal and written, patient verbalized understanding. Alert/oriented X4, Clear speech.   Patient exhibits no distress, ambulates with steady gait per self leaving unit, no further request.      Quentin Calderón RN  10/12/23 3026

## 2023-10-12 NOTE — ED NOTES
Patient to ed with complaints of sinus congestion and cough which started Monday, patient also requesting a work note for today.      Caprice Butcher RN  10/12/23 0558

## 2024-03-23 ENCOUNTER — APPOINTMENT (OUTPATIENT)
Dept: GENERAL RADIOLOGY | Age: 44
End: 2024-03-23
Payer: MEDICARE

## 2024-03-23 ENCOUNTER — APPOINTMENT (OUTPATIENT)
Dept: CT IMAGING | Age: 44
End: 2024-03-23
Payer: MEDICARE

## 2024-03-23 ENCOUNTER — HOSPITAL ENCOUNTER (INPATIENT)
Age: 44
LOS: 6 days | Discharge: HOME | End: 2024-03-29
Attending: EMERGENCY MEDICINE | Admitting: INTERNAL MEDICINE
Payer: MEDICARE

## 2024-03-23 DIAGNOSIS — J06.9 ACUTE UPPER RESPIRATORY INFECTION: ICD-10-CM

## 2024-03-23 DIAGNOSIS — I48.91 ATRIAL FIBRILLATION WITH RAPID VENTRICULAR RESPONSE (HCC): Primary | ICD-10-CM

## 2024-03-23 DIAGNOSIS — R07.9 CHEST PAIN, UNSPECIFIED TYPE: ICD-10-CM

## 2024-03-23 LAB
ALBUMIN SERPL-MCNC: 3.9 G/DL (ref 3.4–5)
ALBUMIN/GLOB SERPL: 1 {RATIO} (ref 1.1–2.2)
ALP SERPL-CCNC: 53 U/L (ref 40–129)
ALT SERPL-CCNC: 14 U/L (ref 10–40)
AMORPH SED URNS QL MICRO: ABNORMAL /HPF
ANION GAP SERPL CALCULATED.3IONS-SCNC: 16 MMOL/L (ref 3–16)
APTT BLD: 27.5 SEC (ref 22.7–35.9)
AST SERPL-CCNC: 29 U/L (ref 15–37)
BACTERIA URNS QL MICRO: ABNORMAL /HPF
BASOPHILS # BLD: 0.1 K/UL (ref 0–0.2)
BASOPHILS NFR BLD: 1.1 %
BILIRUB SERPL-MCNC: 1.3 MG/DL (ref 0–1)
BILIRUB UR QL STRIP.AUTO: NEGATIVE
BUN SERPL-MCNC: 14 MG/DL (ref 7–20)
CALCIUM SERPL-MCNC: 9.2 MG/DL (ref 8.3–10.6)
CHLORIDE SERPL-SCNC: 104 MMOL/L (ref 99–110)
CLARITY UR: CLEAR
CO2 SERPL-SCNC: 21 MMOL/L (ref 21–32)
COLOR UR: YELLOW
CREAT SERPL-MCNC: 1.2 MG/DL (ref 0.9–1.3)
D DIMER: 1.48 UG/ML FEU (ref 0–0.6)
DEPRECATED RDW RBC AUTO: 15 % (ref 12.4–15.4)
EOSINOPHIL # BLD: 0 K/UL (ref 0–0.6)
EOSINOPHIL NFR BLD: 0.2 %
EPI CELLS #/AREA URNS HPF: ABNORMAL /HPF (ref 0–5)
FLUAV RNA UPPER RESP QL NAA+PROBE: NEGATIVE
FLUBV AG NPH QL: NEGATIVE
GFR SERPLBLD CREATININE-BSD FMLA CKD-EPI: >60 ML/MIN/{1.73_M2}
GLUCOSE SERPL-MCNC: 119 MG/DL (ref 70–99)
GLUCOSE UR STRIP.AUTO-MCNC: NEGATIVE MG/DL
HCT VFR BLD AUTO: 46.4 % (ref 40.5–52.5)
HGB BLD-MCNC: 15.6 G/DL (ref 13.5–17.5)
HGB UR QL STRIP.AUTO: ABNORMAL
HYALINE CASTS #/AREA URNS LPF: ABNORMAL /LPF (ref 0–2)
INR PPP: 1.11 (ref 0.84–1.16)
KETONES UR STRIP.AUTO-MCNC: NEGATIVE MG/DL
LACTATE BLDV-SCNC: 1.2 MMOL/L (ref 0.4–1.9)
LACTATE BLDV-SCNC: 2.2 MMOL/L (ref 0.4–1.9)
LEUKOCYTE ESTERASE UR QL STRIP.AUTO: NEGATIVE
LYMPHOCYTES # BLD: 2.1 K/UL (ref 1–5.1)
LYMPHOCYTES NFR BLD: 16.7 %
MAGNESIUM SERPL-MCNC: 2 MG/DL (ref 1.8–2.4)
MCH RBC QN AUTO: 30 PG (ref 26–34)
MCHC RBC AUTO-ENTMCNC: 33.7 G/DL (ref 31–36)
MCV RBC AUTO: 89 FL (ref 80–100)
MONOCYTES # BLD: 1 K/UL (ref 0–1.3)
MONOCYTES NFR BLD: 7.6 %
MUCOUS THREADS #/AREA URNS LPF: ABNORMAL /LPF
NEUTROPHILS # BLD: 9.3 K/UL (ref 1.7–7.7)
NEUTROPHILS NFR BLD: 74.4 %
NITRITE UR QL STRIP.AUTO: NEGATIVE
NT-PROBNP SERPL-MCNC: 4428 PG/ML (ref 0–124)
PH UR STRIP.AUTO: 5.5 [PH] (ref 5–8)
PLATELET # BLD AUTO: 239 K/UL (ref 135–450)
PLATELET BLD QL SMEAR: ADEQUATE
PMV BLD AUTO: 10.4 FL (ref 5–10.5)
POTASSIUM SERPL-SCNC: 3.8 MMOL/L (ref 3.5–5.1)
PROT SERPL-MCNC: 7.7 G/DL (ref 6.4–8.2)
PROT UR STRIP.AUTO-MCNC: 100 MG/DL
PROTHROMBIN TIME: 14.3 SEC (ref 11.5–14.8)
RBC # BLD AUTO: 5.22 M/UL (ref 4.2–5.9)
RBC #/AREA URNS HPF: ABNORMAL /HPF (ref 0–4)
RBC MORPH BLD: NORMAL
SARS-COV-2 RDRP RESP QL NAA+PROBE: NOT DETECTED
SODIUM SERPL-SCNC: 141 MMOL/L (ref 136–145)
SP GR UR STRIP.AUTO: 1.02 (ref 1–1.03)
TROPONIN, HIGH SENSITIVITY: 20 NG/L (ref 0–22)
TROPONIN, HIGH SENSITIVITY: 22 NG/L (ref 0–22)
UA COMPLETE W REFLEX CULTURE PNL UR: ABNORMAL
UA DIPSTICK W REFLEX MICRO PNL UR: YES
URN SPEC COLLECT METH UR: ABNORMAL
UROBILINOGEN UR STRIP-ACNC: 0.2 E.U./DL
WBC # BLD AUTO: 12.5 K/UL (ref 4–11)
WBC #/AREA URNS HPF: ABNORMAL /HPF (ref 0–5)

## 2024-03-23 PROCEDURE — 93005 ELECTROCARDIOGRAM TRACING: CPT | Performed by: EMERGENCY MEDICINE

## 2024-03-23 PROCEDURE — 85025 COMPLETE CBC W/AUTO DIFF WBC: CPT

## 2024-03-23 PROCEDURE — 87635 SARS-COV-2 COVID-19 AMP PRB: CPT

## 2024-03-23 PROCEDURE — 96374 THER/PROPH/DIAG INJ IV PUSH: CPT

## 2024-03-23 PROCEDURE — 2060000000 HC ICU INTERMEDIATE R&B

## 2024-03-23 PROCEDURE — 71045 X-RAY EXAM CHEST 1 VIEW: CPT

## 2024-03-23 PROCEDURE — 87040 BLOOD CULTURE FOR BACTERIA: CPT

## 2024-03-23 PROCEDURE — 2580000003 HC RX 258: Performed by: EMERGENCY MEDICINE

## 2024-03-23 PROCEDURE — 6370000000 HC RX 637 (ALT 250 FOR IP): Performed by: EMERGENCY MEDICINE

## 2024-03-23 PROCEDURE — 81001 URINALYSIS AUTO W/SCOPE: CPT

## 2024-03-23 PROCEDURE — 93005 ELECTROCARDIOGRAM TRACING: CPT | Performed by: INTERNAL MEDICINE

## 2024-03-23 PROCEDURE — 85730 THROMBOPLASTIN TIME PARTIAL: CPT

## 2024-03-23 PROCEDURE — 80053 COMPREHEN METABOLIC PANEL: CPT

## 2024-03-23 PROCEDURE — 87804 INFLUENZA ASSAY W/OPTIC: CPT

## 2024-03-23 PROCEDURE — 6360000004 HC RX CONTRAST MEDICATION: Performed by: EMERGENCY MEDICINE

## 2024-03-23 PROCEDURE — 71260 CT THORAX DX C+: CPT

## 2024-03-23 PROCEDURE — 96366 THER/PROPH/DIAG IV INF ADDON: CPT

## 2024-03-23 PROCEDURE — 85379 FIBRIN DEGRADATION QUANT: CPT

## 2024-03-23 PROCEDURE — 85610 PROTHROMBIN TIME: CPT

## 2024-03-23 PROCEDURE — 83605 ASSAY OF LACTIC ACID: CPT

## 2024-03-23 PROCEDURE — 83735 ASSAY OF MAGNESIUM: CPT

## 2024-03-23 PROCEDURE — 99285 EMERGENCY DEPT VISIT HI MDM: CPT

## 2024-03-23 PROCEDURE — 2500000003 HC RX 250 WO HCPCS: Performed by: EMERGENCY MEDICINE

## 2024-03-23 PROCEDURE — 96365 THER/PROPH/DIAG IV INF INIT: CPT

## 2024-03-23 PROCEDURE — 84484 ASSAY OF TROPONIN QUANT: CPT

## 2024-03-23 PROCEDURE — 2500000003 HC RX 250 WO HCPCS

## 2024-03-23 PROCEDURE — 83880 ASSAY OF NATRIURETIC PEPTIDE: CPT

## 2024-03-23 RX ORDER — DILTIAZEM HYDROCHLORIDE 5 MG/ML
INJECTION INTRAVENOUS
Status: COMPLETED
Start: 2024-03-23 | End: 2024-03-23

## 2024-03-23 RX ORDER — HEPARIN SODIUM 1000 [USP'U]/ML
4000 INJECTION, SOLUTION INTRAVENOUS; SUBCUTANEOUS ONCE
Status: COMPLETED | OUTPATIENT
Start: 2024-03-23 | End: 2024-03-24

## 2024-03-23 RX ORDER — ASPIRIN 81 MG/1
324 TABLET, CHEWABLE ORAL ONCE
Status: COMPLETED | OUTPATIENT
Start: 2024-03-23 | End: 2024-03-23

## 2024-03-23 RX ORDER — 0.9 % SODIUM CHLORIDE 0.9 %
1000 INTRAVENOUS SOLUTION INTRAVENOUS ONCE
Status: COMPLETED | OUTPATIENT
Start: 2024-03-23 | End: 2024-03-23

## 2024-03-23 RX ORDER — HEPARIN SODIUM 1000 [USP'U]/ML
4000 INJECTION, SOLUTION INTRAVENOUS; SUBCUTANEOUS PRN
Status: DISCONTINUED | OUTPATIENT
Start: 2024-03-23 | End: 2024-03-24 | Stop reason: ALTCHOICE

## 2024-03-23 RX ORDER — DILTIAZEM HYDROCHLORIDE 5 MG/ML
15 INJECTION INTRAVENOUS ONCE
Status: COMPLETED | OUTPATIENT
Start: 2024-03-23 | End: 2024-03-23

## 2024-03-23 RX ORDER — HEPARIN SODIUM 1000 [USP'U]/ML
2000 INJECTION, SOLUTION INTRAVENOUS; SUBCUTANEOUS PRN
Status: DISCONTINUED | OUTPATIENT
Start: 2024-03-23 | End: 2024-03-24 | Stop reason: ALTCHOICE

## 2024-03-23 RX ORDER — HEPARIN SODIUM 10000 [USP'U]/100ML
5-30 INJECTION, SOLUTION INTRAVENOUS CONTINUOUS
Status: DISCONTINUED | OUTPATIENT
Start: 2024-03-23 | End: 2024-03-24

## 2024-03-23 RX ADMIN — ASPIRIN 324 MG: 81 TABLET, CHEWABLE ORAL at 19:32

## 2024-03-23 RX ADMIN — IOPAMIDOL 80 ML: 755 INJECTION, SOLUTION INTRAVENOUS at 20:31

## 2024-03-23 RX ADMIN — SODIUM CHLORIDE 1000 ML: 9 INJECTION, SOLUTION INTRAVENOUS at 19:33

## 2024-03-23 RX ADMIN — DILTIAZEM HYDROCHLORIDE 15 MG: 5 INJECTION INTRAVENOUS at 19:39

## 2024-03-23 RX ADMIN — SODIUM CHLORIDE 5 MG/HR: 900 INJECTION, SOLUTION INTRAVENOUS at 19:38

## 2024-03-23 RX ADMIN — DILTIAZEM HYDROCHLORIDE 15 MG: 5 INJECTION, SOLUTION INTRAVENOUS at 19:39

## 2024-03-23 ASSESSMENT — PAIN DESCRIPTION - DESCRIPTORS: DESCRIPTORS: PRESSURE

## 2024-03-23 ASSESSMENT — PAIN DESCRIPTION - LOCATION: LOCATION: CHEST

## 2024-03-23 ASSESSMENT — PAIN DESCRIPTION - PAIN TYPE: TYPE: ACUTE PAIN

## 2024-03-23 ASSESSMENT — PAIN DESCRIPTION - ORIENTATION: ORIENTATION: MID

## 2024-03-23 ASSESSMENT — PAIN - FUNCTIONAL ASSESSMENT: PAIN_FUNCTIONAL_ASSESSMENT: 0-10

## 2024-03-23 ASSESSMENT — PAIN SCALES - GENERAL: PAINLEVEL_OUTOF10: 10

## 2024-03-23 NOTE — ED PROVIDER NOTES
[facility]  EMERGENCY DEPT VISIT      Patient Identification  Gabriel Maya is a 43 y.o. male.    Chief Complaint   Chest Pain and Shortness of Breath      History of Present Illness:    History was obtained from patient.  Limitations to history: none  This is a  43 y.o. male who presents ambulatory to the ED with complaints of chest pressure that started while walking here from work. He has been sick with sinus congestion and cough for last week. No known fever. Clear sputum production. For last 2 days he has had dyspnea on exertion and today has had nausea and vomiting. No diarrhea. No abdominal pain. Patient was at work this evening when his symptoms worsened so they sent him home and while walking to ED he developed the chest pain. No h/o CAD or afib or CHF in past. Does not go the doctor often. He is not having palpitations and does not feel that his heartrate is elevated..     Past Medical History:   Diagnosis Date    History of bipolar disorder & PTSD 7/27/2021    Mixed hyperlipidemia 7/29/2021       Past Surgical History:   Procedure Laterality Date    INGUINAL HERNIA REPAIR Bilateral     WISDOM TOOTH EXTRACTION           Current Facility-Administered Medications:     dilTIAZem injection 10 mg, 10 mg, IntraVENous, Once, Karina Gary MD    dilTIAZem 100 mg in sodium chloride 0.9 % 100 mL infusion (ADD-Beverly), 5 mg/hr, IntraVENous, Continuous, Karina Gray MD, Last Rate: 15 mL/hr at 03/24/24 0043, 15 mg/hr at 03/24/24 0043    heparin (porcine) injection 4,000 Units, 4,000 Units, IntraVENous, PRN, Karina Gray MD    heparin (porcine) injection 2,000 Units, 2,000 Units, IntraVENous, PRN, Karina Gray MD    heparin 25,000 units in dextrose 5% 250 mL (premix) infusion, 5-30 Units/kg/hr, IntraVENous, Continuous, Karina Gray MD  No current outpatient medications on file.    No Known Allergies    Social History     Socioeconomic History    Marital status: Single     Spouse name:  % 16.7 %    Monocytes % 7.6 %    Eosinophils % 0.2 %    Basophils % 1.1 %    Neutrophils Absolute 9.3 (H) 1.7 - 7.7 K/uL    Lymphocytes Absolute 2.1 1.0 - 5.1 K/uL    Monocytes Absolute 1.0 0.0 - 1.3 K/uL    Eosinophils Absolute 0.0 0.0 - 0.6 K/uL    Basophils Absolute 0.1 0.0 - 0.2 K/uL    RBC Morphology Normal    Lactate, Sepsis   Result Value Ref Range    Lactic Acid, Sepsis 2.2 (H) 0.4 - 1.9 mmol/L   Lactate, Sepsis   Result Value Ref Range    Lactic Acid, Sepsis 1.2 0.4 - 1.9 mmol/L   Troponin   Result Value Ref Range    Troponin, High Sensitivity 22 0 - 22 ng/L   D-Dimer, Quantitative   Result Value Ref Range    D-Dimer, Quant 1.48 (H) 0.00 - 0.60 ug/mL FEU   Protime-INR   Result Value Ref Range    Protime 14.3 11.5 - 14.8 sec    INR 1.11 0.84 - 1.16   APTT   Result Value Ref Range    aPTT 27.5 22.7 - 35.9 sec   BNP   Result Value Ref Range    Pro-BNP 4,428 (H) 0 - 124 pg/mL   Urinalysis with Reflex to Culture    Specimen: Urine   Result Value Ref Range    Color, UA Yellow Straw/Yellow    Clarity, UA Clear Clear    Glucose, Ur Negative Negative mg/dL    Bilirubin Urine Negative Negative    Ketones, Urine Negative Negative mg/dL    Specific Gravity, UA 1.020 1.005 - 1.030    Blood, Urine TRACE-INTACT (A) Negative    pH, UA 5.5 5.0 - 8.0    Protein,  (A) Negative mg/dL    Urobilinogen, Urine 0.2 <2.0 E.U./dL    Nitrite, Urine Negative Negative    Leukocyte Esterase, Urine Negative Negative    Microscopic Examination YES     Urine Type NotGiven     Urine Reflex to Culture Not Indicated    Magnesium   Result Value Ref Range    Magnesium 2.00 1.80 - 2.40 mg/dL   Comprehensive Metabolic Panel   Result Value Ref Range    Sodium 141 136 - 145 mmol/L    Potassium 3.8 3.5 - 5.1 mmol/L    Chloride 104 99 - 110 mmol/L    CO2 21 21 - 32 mmol/L    Anion Gap 16 3 - 16    Glucose 119 (H) 70 - 99 mg/dL    BUN 14 7 - 20 mg/dL    Creatinine 1.2 0.9 - 1.3 mg/dL    Est, Glom Filt Rate >60 >60    Calcium 9.2 8.3 - 10.6 mg/dL

## 2024-03-24 LAB
ANTI-XA UNFRAC HEPARIN: <0.1 IU/ML (ref 0.3–0.7)
EKG ATRIAL RATE: 136 BPM
EKG ATRIAL RATE: 394 BPM
EKG DIAGNOSIS: NORMAL
EKG DIAGNOSIS: NORMAL
EKG Q-T INTERVAL: 280 MS
EKG Q-T INTERVAL: 342 MS
EKG QRS DURATION: 82 MS
EKG QRS DURATION: 92 MS
EKG QTC CALCULATION (BAZETT): 475 MS
EKG QTC CALCULATION (BAZETT): 475 MS
EKG R AXIS: -60 DEGREES
EKG R AXIS: -76 DEGREES
EKG T AXIS: 112 DEGREES
EKG T AXIS: 58 DEGREES
EKG VENTRICULAR RATE: 116 BPM
EKG VENTRICULAR RATE: 173 BPM

## 2024-03-24 PROCEDURE — 36415 COLL VENOUS BLD VENIPUNCTURE: CPT

## 2024-03-24 PROCEDURE — 2580000003 HC RX 258: Performed by: EMERGENCY MEDICINE

## 2024-03-24 PROCEDURE — 2580000003 HC RX 258: Performed by: INTERNAL MEDICINE

## 2024-03-24 PROCEDURE — 2500000003 HC RX 250 WO HCPCS: Performed by: INTERNAL MEDICINE

## 2024-03-24 PROCEDURE — 93010 ELECTROCARDIOGRAM REPORT: CPT | Performed by: INTERNAL MEDICINE

## 2024-03-24 PROCEDURE — 2500000003 HC RX 250 WO HCPCS: Performed by: EMERGENCY MEDICINE

## 2024-03-24 PROCEDURE — 6360000002 HC RX W HCPCS: Performed by: EMERGENCY MEDICINE

## 2024-03-24 PROCEDURE — 6360000002 HC RX W HCPCS: Performed by: INTERNAL MEDICINE

## 2024-03-24 PROCEDURE — 2060000000 HC ICU INTERMEDIATE R&B

## 2024-03-24 PROCEDURE — 99223 1ST HOSP IP/OBS HIGH 75: CPT | Performed by: INTERNAL MEDICINE

## 2024-03-24 PROCEDURE — 6370000000 HC RX 637 (ALT 250 FOR IP): Performed by: INTERNAL MEDICINE

## 2024-03-24 PROCEDURE — 85520 HEPARIN ASSAY: CPT

## 2024-03-24 PROCEDURE — 6370000000 HC RX 637 (ALT 250 FOR IP): Performed by: NURSE PRACTITIONER

## 2024-03-24 RX ORDER — LANOLIN ALCOHOL/MO/W.PET/CERES
3 CREAM (GRAM) TOPICAL NIGHTLY
Status: DISCONTINUED | OUTPATIENT
Start: 2024-03-24 | End: 2024-03-29 | Stop reason: HOSPADM

## 2024-03-24 RX ORDER — SENNOSIDES A AND B 8.6 MG/1
1 TABLET, FILM COATED ORAL DAILY PRN
Status: DISCONTINUED | OUTPATIENT
Start: 2024-03-24 | End: 2024-03-29 | Stop reason: HOSPADM

## 2024-03-24 RX ORDER — MAGNESIUM HYDROXIDE/ALUMINUM HYDROXICE/SIMETHICONE 120; 1200; 1200 MG/30ML; MG/30ML; MG/30ML
30 SUSPENSION ORAL EVERY 6 HOURS PRN
Status: DISCONTINUED | OUTPATIENT
Start: 2024-03-24 | End: 2024-03-29 | Stop reason: HOSPADM

## 2024-03-24 RX ORDER — ONDANSETRON 4 MG/1
4 TABLET, ORALLY DISINTEGRATING ORAL EVERY 8 HOURS PRN
Status: DISCONTINUED | OUTPATIENT
Start: 2024-03-24 | End: 2024-03-29 | Stop reason: HOSPADM

## 2024-03-24 RX ORDER — ACETAMINOPHEN 325 MG/1
650 TABLET ORAL EVERY 6 HOURS PRN
Status: DISCONTINUED | OUTPATIENT
Start: 2024-03-24 | End: 2024-03-29 | Stop reason: HOSPADM

## 2024-03-24 RX ORDER — LISINOPRIL 10 MG/1
10 TABLET ORAL DAILY
Status: DISCONTINUED | OUTPATIENT
Start: 2024-03-24 | End: 2024-03-27

## 2024-03-24 RX ORDER — POTASSIUM CHLORIDE 20 MEQ/1
40 TABLET, EXTENDED RELEASE ORAL PRN
Status: DISCONTINUED | OUTPATIENT
Start: 2024-03-24 | End: 2024-03-29 | Stop reason: HOSPADM

## 2024-03-24 RX ORDER — SODIUM CHLORIDE, SODIUM LACTATE, POTASSIUM CHLORIDE, CALCIUM CHLORIDE 600; 310; 30; 20 MG/100ML; MG/100ML; MG/100ML; MG/100ML
INJECTION, SOLUTION INTRAVENOUS CONTINUOUS
Status: DISCONTINUED | OUTPATIENT
Start: 2024-03-24 | End: 2024-03-24

## 2024-03-24 RX ORDER — BENZONATATE 100 MG/1
100 CAPSULE ORAL 3 TIMES DAILY PRN
Status: DISCONTINUED | OUTPATIENT
Start: 2024-03-24 | End: 2024-03-29 | Stop reason: HOSPADM

## 2024-03-24 RX ORDER — SODIUM CHLORIDE 0.9 % (FLUSH) 0.9 %
5-40 SYRINGE (ML) INJECTION PRN
Status: DISCONTINUED | OUTPATIENT
Start: 2024-03-24 | End: 2024-03-29 | Stop reason: HOSPADM

## 2024-03-24 RX ORDER — POTASSIUM CHLORIDE 7.45 MG/ML
10 INJECTION INTRAVENOUS PRN
Status: DISCONTINUED | OUTPATIENT
Start: 2024-03-24 | End: 2024-03-29 | Stop reason: HOSPADM

## 2024-03-24 RX ORDER — SPIRONOLACTONE 25 MG/1
25 TABLET ORAL DAILY
Status: DISCONTINUED | OUTPATIENT
Start: 2024-03-24 | End: 2024-03-29 | Stop reason: HOSPADM

## 2024-03-24 RX ORDER — SODIUM CHLORIDE 0.9 % (FLUSH) 0.9 %
5-40 SYRINGE (ML) INJECTION EVERY 12 HOURS SCHEDULED
Status: DISCONTINUED | OUTPATIENT
Start: 2024-03-24 | End: 2024-03-29 | Stop reason: HOSPADM

## 2024-03-24 RX ORDER — FUROSEMIDE 10 MG/ML
60 INJECTION INTRAMUSCULAR; INTRAVENOUS 2 TIMES DAILY
Status: DISCONTINUED | OUTPATIENT
Start: 2024-03-24 | End: 2024-03-28

## 2024-03-24 RX ORDER — MAGNESIUM SULFATE IN WATER 40 MG/ML
2000 INJECTION, SOLUTION INTRAVENOUS PRN
Status: DISCONTINUED | OUTPATIENT
Start: 2024-03-24 | End: 2024-03-29 | Stop reason: HOSPADM

## 2024-03-24 RX ORDER — SODIUM CHLORIDE 9 MG/ML
INJECTION, SOLUTION INTRAVENOUS PRN
Status: DISCONTINUED | OUTPATIENT
Start: 2024-03-24 | End: 2024-03-29 | Stop reason: HOSPADM

## 2024-03-24 RX ORDER — DILTIAZEM HYDROCHLORIDE 5 MG/ML
10 INJECTION INTRAVENOUS ONCE
Status: COMPLETED | OUTPATIENT
Start: 2024-03-24 | End: 2024-03-24

## 2024-03-24 RX ORDER — METOPROLOL SUCCINATE 100 MG/1
100 TABLET, EXTENDED RELEASE ORAL DAILY
Status: DISCONTINUED | OUTPATIENT
Start: 2024-03-24 | End: 2024-03-29 | Stop reason: HOSPADM

## 2024-03-24 RX ORDER — ONDANSETRON 2 MG/ML
4 INJECTION INTRAMUSCULAR; INTRAVENOUS EVERY 6 HOURS PRN
Status: DISCONTINUED | OUTPATIENT
Start: 2024-03-24 | End: 2024-03-29 | Stop reason: HOSPADM

## 2024-03-24 RX ORDER — ACETAMINOPHEN 650 MG/1
650 SUPPOSITORY RECTAL EVERY 6 HOURS PRN
Status: DISCONTINUED | OUTPATIENT
Start: 2024-03-24 | End: 2024-03-29 | Stop reason: HOSPADM

## 2024-03-24 RX ADMIN — FUROSEMIDE 60 MG: 10 INJECTION, SOLUTION INTRAMUSCULAR; INTRAVENOUS at 16:06

## 2024-03-24 RX ADMIN — HEPARIN SODIUM 4000 UNITS: 1000 INJECTION INTRAVENOUS; SUBCUTANEOUS at 04:56

## 2024-03-24 RX ADMIN — HEPARIN SODIUM AND DEXTROSE 12 UNITS/KG/HR: 10000; 5 INJECTION INTRAVENOUS at 04:58

## 2024-03-24 RX ADMIN — HEPARIN SODIUM AND DEXTROSE 8 UNITS/KG/HR: 10000; 5 INJECTION INTRAVENOUS at 00:45

## 2024-03-24 RX ADMIN — METOPROLOL SUCCINATE 100 MG: 100 TABLET, EXTENDED RELEASE ORAL at 11:16

## 2024-03-24 RX ADMIN — DILTIAZEM HYDROCHLORIDE 10 MG: 5 INJECTION, SOLUTION INTRAVENOUS at 01:15

## 2024-03-24 RX ADMIN — HEPARIN SODIUM 4000 UNITS: 1000 INJECTION INTRAVENOUS; SUBCUTANEOUS at 00:44

## 2024-03-24 RX ADMIN — SODIUM CHLORIDE, POTASSIUM CHLORIDE, SODIUM LACTATE AND CALCIUM CHLORIDE: 600; 310; 30; 20 INJECTION, SOLUTION INTRAVENOUS at 03:44

## 2024-03-24 RX ADMIN — RIVAROXABAN 20 MG: 20 TABLET, FILM COATED ORAL at 11:16

## 2024-03-24 RX ADMIN — FUROSEMIDE 60 MG: 10 INJECTION, SOLUTION INTRAMUSCULAR; INTRAVENOUS at 11:16

## 2024-03-24 RX ADMIN — SPIRONOLACTONE 25 MG: 25 TABLET ORAL at 11:16

## 2024-03-24 RX ADMIN — DILTIAZEM HYDROCHLORIDE 15 MG/HR: 5 INJECTION, SOLUTION INTRAVENOUS at 03:49

## 2024-03-24 RX ADMIN — SODIUM CHLORIDE, PRESERVATIVE FREE 10 ML: 5 INJECTION INTRAVENOUS at 20:31

## 2024-03-24 RX ADMIN — SODIUM CHLORIDE 15 MG/HR: 900 INJECTION, SOLUTION INTRAVENOUS at 02:37

## 2024-03-24 RX ADMIN — DILTIAZEM HYDROCHLORIDE 15 MG/HR: 5 INJECTION, SOLUTION INTRAVENOUS at 12:25

## 2024-03-24 RX ADMIN — HEPARIN SODIUM AND DEXTROSE 8 UNITS/KG/HR: 10000; 5 INJECTION INTRAVENOUS at 02:38

## 2024-03-24 RX ADMIN — LISINOPRIL 10 MG: 10 TABLET ORAL at 11:16

## 2024-03-24 RX ADMIN — BENZONATATE 100 MG: 100 CAPSULE ORAL at 05:01

## 2024-03-24 RX ADMIN — Medication 3 MG: at 20:31

## 2024-03-24 ASSESSMENT — PAIN SCALES - GENERAL
PAINLEVEL_OUTOF10: 4
PAINLEVEL_OUTOF10: 4

## 2024-03-24 ASSESSMENT — PAIN DESCRIPTION - LOCATION
LOCATION: CHEST
LOCATION: CHEST

## 2024-03-24 ASSESSMENT — PAIN DESCRIPTION - DESCRIPTORS
DESCRIPTORS: PRESSURE
DESCRIPTORS: PRESSURE

## 2024-03-24 ASSESSMENT — PAIN DESCRIPTION - PAIN TYPE
TYPE: ACUTE PAIN
TYPE: ACUTE PAIN

## 2024-03-24 ASSESSMENT — PAIN DESCRIPTION - FREQUENCY
FREQUENCY: CONTINUOUS
FREQUENCY: CONTINUOUS

## 2024-03-24 ASSESSMENT — PAIN - FUNCTIONAL ASSESSMENT
PAIN_FUNCTIONAL_ASSESSMENT: ACTIVITIES ARE NOT PREVENTED
PAIN_FUNCTIONAL_ASSESSMENT: ACTIVITIES ARE NOT PREVENTED

## 2024-03-24 ASSESSMENT — PAIN DESCRIPTION - ONSET
ONSET: ON-GOING
ONSET: ON-GOING

## 2024-03-24 ASSESSMENT — PAIN DESCRIPTION - ORIENTATION
ORIENTATION: MID
ORIENTATION: MID

## 2024-03-24 NOTE — CONSULTS
[facility]  Cardiology Consult Note        CC:      New onset atrial fibrillation             HPI:   This is a 43 y.o. male who we have been asked to see for atrial fibrillation.    He came to the ER for chest pressure.  The patient has been sick with sinus congestion and cough but no fever.  He was sent home from work because of his illness and while walking to home he developed chest pressure.    On my interrogation the patient states that he has been short of breath and sick for about 3 weeks.  He has difficulty laying flat and walking.  He does not drink.  He does not know if he has sleep apnea.  No history of chest pressure/pain no history of heart disease      Past Medical History:   Diagnosis Date    History of bipolar disorder & PTSD 7/27/2021    Mixed hyperlipidemia 7/29/2021      Past Surgical History:   Procedure Laterality Date    INGUINAL HERNIA REPAIR Bilateral     WISDOM TOOTH EXTRACTION        Family History   Adopted: Yes   Family history unknown: Yes      Social History     Tobacco Use    Smoking status: Never    Smokeless tobacco: Never   Substance Use Topics    Alcohol use: Yes     Comment: rare    Drug use: No      No Known Allergies   sodium chloride flush  5-40 mL IntraVENous 2 times per day    melatonin  3 mg Oral Nightly       Review of Systems -   Constitutional: Negative for weight gain/loss; malaise, fever  Respiratory: Negative for Asthma;  cough and hemoptysis  Cardiovascular: Negative for palpitations,dizziness   Gastrointestinal: Negative for abd.pain; constipation/diarrhea;    Genitourinary: Negative for stones; hematuria; frequency hesitancy  Integumentt: Negative for rash or pruritis  Hematologic/lymphatic: Negative for blood dyscrasia; leukemia/lymphoma  Musculoskeletal: Negative for Connective tissue disease  Neurological:  Negative for Seizure   Behavioral/Psych:Negative for Bipolar disorder, Schizophrenia; Dementia  Endocrine: negative for thyroid, parathyroid

## 2024-03-24 NOTE — PLAN OF CARE
Problem: Discharge Planning  Goal: Discharge to home or other facility with appropriate resources  3/24/2024 0801 by Nayla Echevarria, RN  Outcome: Progressing  Flowsheets (Taken 3/24/2024 0801)  Discharge to home or other facility with appropriate resources:   Identify barriers to discharge with patient and caregiver   Identify discharge learning needs (meds, wound care, etc)   Arrange for needed discharge resources and transportation as appropriate     Problem: Pain  Goal: Verbalizes/displays adequate comfort level or baseline comfort level  3/24/2024 0801 by Nayla Echevarria, RN  Outcome: Progressing  Flowsheets (Taken 3/24/2024 0801)  Verbalizes/displays adequate comfort level or baseline comfort level:   Encourage patient to monitor pain and request assistance   Administer analgesics based on type and severity of pain and evaluate response   Assess pain using appropriate pain scale     Problem: Safety - Adult  Goal: Free from fall injury  3/24/2024 1431 by Chapo Phan, RN  Outcome: Progressing  Note: Patient instructed on correct use of call light and is using appropriately.   3/24/2024 0801 by Nayla Echevarria, RN  Outcome: Progressing  Flowsheets (Taken 3/24/2024 0801)  Free From Fall Injury: Instruct family/caregiver on patient safety

## 2024-03-24 NOTE — PLAN OF CARE
Problem: Discharge Planning  Goal: Discharge to home or other facility with appropriate resources  Outcome: Progressing  Flowsheets (Taken 3/24/2024 0801)  Discharge to home or other facility with appropriate resources:   Identify barriers to discharge with patient and caregiver   Identify discharge learning needs (meds, wound care, etc)   Arrange for needed discharge resources and transportation as appropriate     Problem: Pain  Goal: Verbalizes/displays adequate comfort level or baseline comfort level  Outcome: Progressing  Flowsheets (Taken 3/24/2024 0801)  Verbalizes/displays adequate comfort level or baseline comfort level:   Encourage patient to monitor pain and request assistance   Administer analgesics based on type and severity of pain and evaluate response   Assess pain using appropriate pain scale     Problem: Safety - Adult  Goal: Free from fall injury  Outcome: Progressing  Flowsheets (Taken 3/24/2024 0801)  Free From Fall Injury: Instruct family/caregiver on patient safety

## 2024-03-24 NOTE — PROGRESS NOTES
4 Eyes Skin Assessment     NAME:  Gabriel Maya  YOB: 1980  MEDICAL RECORD NUMBER:  [id number]    The patient is being assessed for  Admission    I agree that at least one RN has performed a thorough Head to Toe Skin Assessment on the patient. ALL assessment sites listed below have been assessed.      Areas assessed by both nurses:    Head, Face, Ears, Shoulders, Back, Chest, Arms, Elbows, Hands, Sacrum. Buttock, Coccyx, Ischium, Legs. Feet and Heels, and Under Medical Devices         Does the Patient have a Wound? No noted wound(s)       Samy Prevention initiated by RN: No  Wound Care Orders initiated by RN: No    Pressure Injury (Stage 3,4, Unstageable, DTI, NWPT, and Complex wounds) if present, place Wound referral order by RN under : No    New Ostomies, if present place, Ostomy referral order under : No     Nurse 1 eSignature: Electronically signed by Nayla Echevarria RN on 3/24/24 at 3:57 AM EDT    **SHARE this note so that the co-signing nurse can place an eSignature**    Nurse 2 eSignature: Electronically signed by Elizabeth Farrell RN on 3/24/24 at 2:51 AM EDT

## 2024-03-24 NOTE — H&P
Hospital Medicine History & Physical      PCP: Joe Perla DO    Date of Admission: 3/23/2024    Date of Service: Pt seen/examined and Admitted with expected LOS greater than two midnights due to medical therapy.     Chief Complaint:      Shortness of breath, dyspnea on exertion nausea vomiting.    History Of Present Illness:      This is a 42-year-old male with no significant past medical history who is transferred from  ED to [facility] for further care management  Patient presented to the emergency room with upper respiratory symptoms which has been going on for 1 week shortness of breath worsening on exertion for the last 2 days vomiting and chest heaviness which started today.  Patient denies any loss of consciousness no diaphoresis no previous history of cardiac disease.  Workup in the emergency room notable for:  Patient was noticed to be in A-fib with RVR with a rate of 180s on arrival elevated blood pressure.  Imaging studies did not reveal any evidence of pneumonia  Patient does not seem to have any acute congestive heart failure but elevated BNP as well as elevated D-dimer.  Pulmonary emboli was ruled out based on CTPA.    Troponin 22.  Patient started on Cardizem drip as well as IV heparin drip in the emergency room rate heart rate started decreasing to in the range of 120s.  Patient is planned to be transferred to [facility] PCU for further care management as well as cardiology consult evaluation.      Past medical history significant for history of bipolar disorder PTSD  Mixed hyperlipidemia  Currently on no medications    Past Medical History:          Diagnosis Date    History of bipolar disorder & PTSD 7/27/2021    Mixed hyperlipidemia 7/29/2021       Past Surgical History:          Procedure Laterality Date    INGUINAL HERNIA REPAIR Bilateral     WISDOM TOOTH EXTRACTION         Medications Prior to Admission:      Prior to Admission medications    Medication Sig

## 2024-03-24 NOTE — ED NOTES
Report given to [facility] Transfer Team and to [facility] RN for Room 4456  Pt en route to hospital.

## 2024-03-25 LAB
ANION GAP SERPL CALCULATED.3IONS-SCNC: 13 MMOL/L (ref 3–16)
BASOPHILS # BLD: 0.1 K/UL (ref 0–0.2)
BASOPHILS NFR BLD: 1.3 %
BUN SERPL-MCNC: 14 MG/DL (ref 7–20)
CALCIUM SERPL-MCNC: 9 MG/DL (ref 8.3–10.6)
CHLORIDE SERPL-SCNC: 104 MMOL/L (ref 99–110)
CO2 SERPL-SCNC: 24 MMOL/L (ref 21–32)
CREAT SERPL-MCNC: 1.1 MG/DL (ref 0.9–1.3)
DEPRECATED RDW RBC AUTO: 14.8 % (ref 12.4–15.4)
EOSINOPHIL # BLD: 0.1 K/UL (ref 0–0.6)
EOSINOPHIL NFR BLD: 1 %
GFR SERPLBLD CREATININE-BSD FMLA CKD-EPI: >60 ML/MIN/{1.73_M2}
GLUCOSE SERPL-MCNC: 111 MG/DL (ref 70–99)
HCT VFR BLD AUTO: 43.4 % (ref 40.5–52.5)
HGB BLD-MCNC: 14.5 G/DL (ref 13.5–17.5)
LACTATE BLDV-SCNC: 1.2 MMOL/L (ref 0.4–2)
LEFT VENTRICULAR EJECTION FRACTION HIGH VALUE: 25 %
LEFT VENTRICULAR EJECTION FRACTION MODE: NORMAL
LV EF: 20 %
LYMPHOCYTES # BLD: 2.3 K/UL (ref 1–5.1)
LYMPHOCYTES NFR BLD: 23.3 %
MAGNESIUM SERPL-MCNC: 1.8 MG/DL (ref 1.8–2.4)
MCH RBC QN AUTO: 30 PG (ref 26–34)
MCHC RBC AUTO-ENTMCNC: 33.4 G/DL (ref 31–36)
MCV RBC AUTO: 89.8 FL (ref 80–100)
MONOCYTES # BLD: 0.8 K/UL (ref 0–1.3)
MONOCYTES NFR BLD: 8.2 %
NEUTROPHILS # BLD: 6.6 K/UL (ref 1.7–7.7)
NEUTROPHILS NFR BLD: 66.2 %
PLATELET # BLD AUTO: 190 K/UL (ref 135–450)
PMV BLD AUTO: 10.2 FL (ref 5–10.5)
POTASSIUM SERPL-SCNC: 3.5 MMOL/L (ref 3.5–5.1)
RBC # BLD AUTO: 4.84 M/UL (ref 4.2–5.9)
SODIUM SERPL-SCNC: 141 MMOL/L (ref 136–145)
WBC # BLD AUTO: 10 K/UL (ref 4–11)

## 2024-03-25 PROCEDURE — 97161 PT EVAL LOW COMPLEX 20 MIN: CPT

## 2024-03-25 PROCEDURE — 93356 MYOCRD STRAIN IMG SPCKL TRCK: CPT

## 2024-03-25 PROCEDURE — C8929 TTE W OR WO FOL WCON,DOPPLER: HCPCS

## 2024-03-25 PROCEDURE — 99223 1ST HOSP IP/OBS HIGH 75: CPT | Performed by: INTERNAL MEDICINE

## 2024-03-25 PROCEDURE — 97530 THERAPEUTIC ACTIVITIES: CPT

## 2024-03-25 PROCEDURE — 2500000003 HC RX 250 WO HCPCS: Performed by: INTERNAL MEDICINE

## 2024-03-25 PROCEDURE — 36415 COLL VENOUS BLD VENIPUNCTURE: CPT

## 2024-03-25 PROCEDURE — 6360000002 HC RX W HCPCS: Performed by: INTERNAL MEDICINE

## 2024-03-25 PROCEDURE — 85025 COMPLETE CBC W/AUTO DIFF WBC: CPT

## 2024-03-25 PROCEDURE — 6370000000 HC RX 637 (ALT 250 FOR IP): Performed by: INTERNAL MEDICINE

## 2024-03-25 PROCEDURE — 83735 ASSAY OF MAGNESIUM: CPT

## 2024-03-25 PROCEDURE — 2060000000 HC ICU INTERMEDIATE R&B

## 2024-03-25 PROCEDURE — 97535 SELF CARE MNGMENT TRAINING: CPT

## 2024-03-25 PROCEDURE — 83605 ASSAY OF LACTIC ACID: CPT

## 2024-03-25 PROCEDURE — 80048 BASIC METABOLIC PNL TOTAL CA: CPT

## 2024-03-25 PROCEDURE — 97116 GAIT TRAINING THERAPY: CPT

## 2024-03-25 PROCEDURE — 97165 OT EVAL LOW COMPLEX 30 MIN: CPT

## 2024-03-25 PROCEDURE — 2580000003 HC RX 258: Performed by: INTERNAL MEDICINE

## 2024-03-25 RX ADMIN — RIVAROXABAN 20 MG: 20 TABLET, FILM COATED ORAL at 18:11

## 2024-03-25 RX ADMIN — FUROSEMIDE 60 MG: 10 INJECTION, SOLUTION INTRAMUSCULAR; INTRAVENOUS at 18:12

## 2024-03-25 RX ADMIN — SPIRONOLACTONE 25 MG: 25 TABLET ORAL at 09:00

## 2024-03-25 RX ADMIN — SODIUM CHLORIDE, PRESERVATIVE FREE 10 ML: 5 INJECTION INTRAVENOUS at 21:09

## 2024-03-25 RX ADMIN — METOPROLOL SUCCINATE 100 MG: 100 TABLET, EXTENDED RELEASE ORAL at 09:00

## 2024-03-25 RX ADMIN — LISINOPRIL 10 MG: 10 TABLET ORAL at 09:00

## 2024-03-25 RX ADMIN — DILTIAZEM HYDROCHLORIDE 7.5 MG/HR: 5 INJECTION, SOLUTION INTRAVENOUS at 04:55

## 2024-03-25 RX ADMIN — FUROSEMIDE 60 MG: 10 INJECTION, SOLUTION INTRAMUSCULAR; INTRAVENOUS at 09:00

## 2024-03-25 RX ADMIN — SODIUM CHLORIDE, PRESERVATIVE FREE 10 ML: 5 INJECTION INTRAVENOUS at 10:00

## 2024-03-25 ASSESSMENT — PAIN SCALES - GENERAL
PAINLEVEL_OUTOF10: 0

## 2024-03-25 NOTE — PROGRESS NOTES
V2.0    [facility] Progress Note      Name:  Gabriel Maya /Age/Sex: 1980  (43 y.o. male)   MRN & CSN:  [id number] & [id number] Encounter Date/Time: 3/25/2024 1:08 PM EDT   Location:  Critical access hospital[facility] PCP: Joe Perla DO     Attending:David Ponce MD       Hospital Day: 3    Assessment and Recommendations       Assessment/Plan:     New onset A-fib:  Patient remains on diltiazem and  Patient's been switched from heparin drip Xarelto    CHF:  Patient is markedly elevated proBNP  Reviewed note from cardiology  Concern for cardiomyopathy possibly tachycardic induced or ischemic?  Workup in process  Echo results pending  Lasix 60 mg IV twice a day  Will monitor renal function while on IV Lasix  Additionally start spironolactone lisinopril and metoprolol    Suspected sleep apnea:  Will need sleep study on outpatient basis      Disposition: Case discussed with case management.  Anticipate patient being here 2-3 more days, pending cardiac workup, with anticipated discharge home without needs.    Diet ADULT DIET; Regular; Low Fat/Low Chol/High Fiber/2 gm Na   DVT Prophylaxis [] Lovenox, []  Heparin, [] SCDs, [] Ambulation,  [] Eliquis, [x] Xarelto  [] Coumadin   Code Status Full Code       Surrogate Decision Maker/ POA      Personally reviewed Lab Studies and Imaging     I personally reviewed CTA of chest which shows no PE nor pulmonary congestion or infiltrate    Reviewed telemetry strip which shows A-fib rate controlled  Subjective:     Chief Complaint: Shortness of breath    his is a 42-year-old male with no significant past medical history who is transferred from  ED to [facility] for further care management  Patient presented to the emergency room with upper respiratory symptoms which has been going on for 1 week shortness of breath worsening on exertion for the last 2 days vomiting and chest heaviness which started today.  Patient denies any loss of consciousness no diaphoresis  Split-Thickness Skin Graft Text: The defect edges were debeveled with a #15 scalpel blade.  Given the location of the defect, shape of the defect and the proximity to free margins a split thickness skin graft was deemed most appropriate.  Using a sterile surgical marker, the primary defect shape was transferred to the donor site. The split thickness graft was then harvested.  The skin graft was then placed in the primary defect and oriented appropriately.

## 2024-03-25 NOTE — DISCHARGE INSTRUCTIONS
[facility]  Cardiovascular Special Procedures  Cardioversion  Patient Discharge Instructions      No driving for 24 hours.  We strongly recommend that a responsible adult stay with you for the next 24 hours.    Continue Xarelto  as directed by physician     Hydrocortisone 1% cream to reddened areas as needed.         [facility]  Cardiovascular Special Procedures  General Discharge Instructions    PROCEDURE: ____________________________________________________    ____ You may be drowsy or lightheaded after receiving sedation. DO NOT operate a vehicle (automobile, bicycle, motorcycle, machinery, or power tools), make any important decisions or sign any important/legal documents, or drink alcoholic beverages for the next 24 hours  ____ We strongly suggest that a responsible adult be with you for the next 24 hours for your protection and safety  ____ If the intravenous catheter site is painful, apply warm wet compresses on the site until the soreness is relieved and elevate the arm above the heart.  Call your physician if no improvement in 2 to 3 days    DIETARY INSTRUCTIONS:    ____ Drink extra fluids over the next 24 hours (If not contraindicated by illness or by                   physician order)  ____ Start with clear liquids and progress to normal diet as you feel like eating.  If you experience nausea or repeated episodes of vomiting, which persist beyond 12-24 hours, notify your doctor        ____ Resume your previous diet    ACTIVITY INSTRUCTIONS:    ____ See other instructions  ____ No special instructions  ____ Rest for 24 hours    ____ Up as tolerated  ____ Increase activity as tolerated    Wound/Dressing Instructions:  ____ See other instructions  ____ May shower, tomorrow  ____ Remove bandage within 24 hours    MEDICATION INSTRUCTIONS:    ____ See Medication Reconciliation Sheet      SPECIAL

## 2024-03-25 NOTE — PLAN OF CARE
Problem: Discharge Planning  Goal: Discharge to home or other facility with appropriate resources  Outcome: Progressing  Flowsheets (Taken 3/25/2024 0157)  Discharge to home or other facility with appropriate resources:   Identify barriers to discharge with patient and caregiver   Identify discharge learning needs (meds, wound care, etc)   Arrange for needed discharge resources and transportation as appropriate  Note: Plan is for patient to have ECHO 3/25, possible cardioversion/LINH in future     Problem: Pain  Goal: Verbalizes/displays adequate comfort level or baseline comfort level  Outcome: Progressing  Flowsheets (Taken 3/25/2024 0157)  Verbalizes/displays adequate comfort level or baseline comfort level:   Encourage patient to monitor pain and request assistance   Administer analgesics based on type and severity of pain and evaluate response   Assess pain using appropriate pain scale     Problem: Safety - Adult  Goal: Free from fall injury  3/25/2024 0157 by Nayla Echevarria, RN  Outcome: Progressing  Flowsheets (Taken 3/25/2024 0157)  Free From Fall Injury: Instruct family/caregiver on patient safety  Note: Pt is a Fall Risk. See Miller Fall Risk Score. Pt bed in low position and side rails up. Call light and belongings in reach. Pt encouraged to call for assistance. Will continue with hourly rounds for PO intake, pain needs, toileting, and repositioning as needed.        Problem: Respiratory - Adult  Goal: Achieves optimal ventilation and oxygenation  Outcome: Progressing  Flowsheets (Taken 3/25/2024 0157)  Achieves optimal ventilation and oxygenation:   Assess for changes in respiratory status   Position to facilitate oxygenation and minimize respiratory effort   Initiate smoking cessation protocol as indicated   Assess for changes in mentation and behavior  Note: Patient is on RA with saturations above 90.

## 2024-03-25 NOTE — PROGRESS NOTES
03/25/24 1006   Encounter Summary   Encounter Overview/Reason  Advance Care Planning   Referral/Consult From: Nurse   Last Encounter  03/25/24  (MSR-  met with pt. Conversation on POA. Pt request , follow up.)   Begin Time 0959   End Time  1007   Total Time Calculated 8 min   Advance Care Planning   Type ACP conversation   Plan and Referrals   Plan/Referrals Continue to visit, (comment)  (03/26/24)     Claudia Zuñiga MDiv., BCC

## 2024-03-25 NOTE — PROGRESS NOTES
Physical Therapy  Facility/Department: [facility] 4 PCU  Physical Therapy Initial Assessment, Treatment and D/c    Name: Gabriel Maya  : 1980  MRN: [id number]  Date of Service: 3/25/2024    Discharge Recommendations:  Home with assist PRN   PT Equipment Recommendations  Equipment Needed: No      Patient Diagnosis(es): The primary encounter diagnosis was Atrial fibrillation with rapid ventricular response (HCC). Diagnoses of Chest pain, unspecified type and Acute upper respiratory infection were also pertinent to this visit.  Past Medical History:  has a past medical history of History of bipolar disorder & PTSD and Mixed hyperlipidemia.  Past Surgical History:  has a past surgical history that includes [place] tooth extraction and Inguinal hernia repair (Bilateral).    Assessment   Assessment: Pt is 44 yo M to [facility] on 3/23/24 with new onset afib.  Pt moving well.  Pt SOB with activity, but states this is his baseline for years.  Pt's HR increased to 168 with activity and O2 sats at 94 on RA.  Nursing notified.  Rec return home with family assist as needed.  No further PT indicated.  D/c.  Therapy Prognosis: Good  Decision Making: Low Complexity  Requires PT Follow-Up: No  Activity Tolerance  Activity Tolerance: Patient tolerated treatment well  Activity Tolerance Comments: [place], elevated HR     Plan   Safety Devices  Type of Devices: Call light within reach, Nurse notified, Left in chair (RN declines need for chair alarm)     Restrictions  Restrictions/Precautions  Restrictions/Precautions: Up as Tolerated  Position Activity Restriction  Other position/activity restrictions: HOB > 30 degrees     Subjective   General  Chart Reviewed: Yes  Patient assessed for rehabilitation services?: Yes  Additional Pertinent Hx: Pt is 44 yo M admitted on 3/23/24 with afib.  H/o bipolar and PTSD.  Family / Caregiver Present: No  Referring Practitioner: Dr. Aburto  Diagnosis: New onset a-fib  Subjective  Subjective: Pt supine in  bed and agreeable to PT.  C/o R arm pain from past injury, not rated, nsg notified.    Social/Functional History  Social/Functional History  Lives With: Spouse  Type of Home: Apartment  Home Layout: One level, Laundry in basement  Home Access: Stairs to enter with rails  Entrance Stairs - Number of Steps: 2  Bathroom Shower/Tub: Tub/Shower unit  Bathroom Toilet: Standard  Home Equipment:  (No DME)  ADL Assistance: Independent  Homemaking Assistance: Independent  Ambulation Assistance: Independent  Active : Yes  Occupation: Full time employment  Type of Occupation:   Additional Comments: Family in the area, can assist as needed.  Denies falls.    Vision/Hearing  Vision  Vision: Within Functional Limits  Hearing  Hearing: Within functional limits      Cognition   Orientation  Overall Orientation Status: Within Normal Limits  Cognition  Overall Cognitive Status: WNL     Objective   Observation/Palpation  Observation: Pt with IV    AROM RLE (degrees)  RLE AROM: WFL  AROM LLE (degrees)  LLE AROM : WFL    Strength RLE  Strength RLE: WFL  Strength LLE  Strength LLE: WFL    Bed mobility  Supine to Sit: Modified independent  Scooting: Independent    Transfers  Sit to Stand: Supervision  Stand to Sit: Supervision    Ambulation  Assistance: Supervision  Quality of Gait: Steady, SOB with activity  Gait Deviations: None  Distance: 250 feet and 15 feet  Comments: HR up to 168 bpm, goes down to 110 bpm with rest    Balance  Sitting - Static: Good  Sitting - Dynamic: Good  Standing - Static: Good  Standing - Dynamic: Good    AM-PAC - Mobility  AM-PAC Basic Mobility - Inpatient   How much help is needed turning from your back to your side while in a flat bed without using bedrails?: None  How much help is needed moving from lying on your back to sitting on the side of a flat bed without using bedrails?: None  How much help is needed moving to and from a bed to a chair?: None  How much help is needed

## 2024-03-25 NOTE — PROGRESS NOTES
Occupational Therapy  Facility/Department: [address]  Occupational Therapy Initial Assessment/Tx/Discharge    Name: Gabriel Maya  : 1980  MRN: [id number]  Date of Service: 3/25/2024    Discharge Recommendations:  Home with assist PRN  OT Equipment Recommendations  Equipment Needed: No       Patient Diagnosis(es): The primary encounter diagnosis was Atrial fibrillation with rapid ventricular response (HCC). Diagnoses of Chest pain, unspecified type and Acute upper respiratory infection were also pertinent to this visit.  Past Medical History:  has a past medical history of History of bipolar disorder & PTSD and Mixed hyperlipidemia.  Past Surgical History:  has a past surgical history that includes [place] tooth extraction and Inguinal hernia repair (Bilateral).           Assessment   Assessment: Pt is doing well this date and up ad to in room at end of session. Pt performing all transfers, fx mobility, and ADLs with supervision-independence. Therapist managed IV pole during ambulation. Pt has heavy breathing and had bout of VTACH during fx mobility outside of room. HR elevated with all activity and returns to 110's at rest. Pt spo2 stayed WFL on RA. Pt has no further acute OT needs at this time. Recommend pt d/c home with PRN A from family. Will sign off but please re consult if needs change. Pt in agreement with plan.  Prognosis: Good  Decision Making: Medium Complexity  No Skilled OT: Safe to return home;No OT goals identified  REQUIRES OT FOLLOW-UP: No  Activity Tolerance  Activity Tolerance Comments: Pt limited by elevated HR with ax and heavy breathing with ax- pt not c/o pain or fatigue        Plan   Occupational Therapy Plan  Times Per Week: d/c acute OT     Restrictions  Restrictions/Precautions  Restrictions/Precautions: Up as Tolerated  Position Activity Restriction  Other position/activity restrictions: HOB > 30 degrees    Subjective   General  Chart Reviewed: Yes  Patient assessed for  rehabilitation services?: Yes  Additional Pertinent Hx: 42-year-old male with no significant past medical history who is transferred from  ED to [facility] for further care management  Patient presented to the emergency room with upper respiratory symptoms which has been going on for 1 week shortness of breath worsening on exertion for the last 2 days vomiting and chest heaviness which started today.  Patient denies any loss of consciousness no diaphoresis no previous history of cardiac disease.  Workup in the emergency room notable for: A-fib with RVR with a rate of 180s on arrival elevated blood pressure.  Imaging studies did not reveal any evidence of pneumonia. Pt with elevated BNP as well as elevated D-dimer.  Pulmonary emboli was ruled out based on CTPA. Troponin 22.  Patient started on Cardizem drip as well as IV heparin drip in the emergency room & heart rate started decreasing to in the range of 120s. Per Cardiology consult: \"Has markedly elevated proBNP, suggestive of CHF,  Suspect cardiomyopathy, possibly tachycardia induced.  Will need echo to evaluate for cardiomyopathy\"  Referring Practitioner: Kimi Contreras MD  Diagnosis: New onset a-fib  Subjective  Subjective: Pt in bed upon OT arrival and agreeable to OT evaluation. Pt pleasant throughout session and pt reporting the only pain is in his R arm that was present before admission. Pt did not provide pain rating.     Social/Functional History  Social/Functional History  Lives With: Spouse  Type of Home: Apartment  Home Layout: One level, Laundry in basement  Home Access: Stairs to enter with rails  Entrance Stairs - Number of Steps: 2  Bathroom Shower/Tub: Tub/Shower unit  Bathroom Toilet: Standard  Home Equipment:  (No DME)  ADL Assistance: Independent  Homemaking Assistance: Independent  Ambulation Assistance: Independent  Active : Yes  Occupation: Full time employment  Type of Occupation:   Additional

## 2024-03-25 NOTE — CONSULTS
[facility]   Electrophysiology Consultation     Date: 3/25/2024  Reason for Consultation: Atrial fibrillation  Consult Requesting Physician: David Ponce MD     CC: Shortness of breath, chest pain    HPI:   Gabriel Maya is a 43 y.o. male history of hypertension, initially presented to the emergency department at [place] with progressive chest pain and shortness of breath.  Patient has felt unwell over the past couple weeks complaining of progressive dyspnea, orthopnea and paroxysmal nocturnal dyspnea, denies swelling. He was walking home from work, approximately 1 to 2 miles, normally this is not causing any symptoms however on this occasion he had progressive chest pressure like an elephant was sitting on his chest, accompanying shortness of breath.  At the [place] ED he was found to be in atrial fibrillation with RVR, heart rates up to the 190s, started on anticoagulation and treated with diltiazem.  Heart rates improved.    Review of System:  Complete 10 point ROS performed and negative unless noted in above HPI or below    Prior to Admission medications    Medication Sig Start Date End Date Taking? Authorizing Provider   ibuprofen (ADVIL;MOTRIN) 600 MG tablet Take 1 tablet by mouth every 6 hours as needed for Pain 7/7/19 12/18/20  Jeanie Bauer MD       Past Medical History:   Diagnosis Date    History of bipolar disorder & PTSD 7/27/2021    Mixed hyperlipidemia 7/29/2021        Past Surgical History:   Procedure Laterality Date    INGUINAL HERNIA REPAIR Bilateral     WISDOM TOOTH EXTRACTION       No Known Allergies    Social History:  Reviewed.  reports that he has never smoked. He has never used smokeless tobacco. He reports current alcohol use. He reports that he does not use drugs.     Family History:  Reviewed. No family history of SCD.      Physical Examination:  /78   Pulse 100   Temp 98.5 °F (36.9 °C) (Oral)   Resp 20   Ht 1.753 m (5' 9\")   Wt 132.3 kg (291 lb 10.7

## 2024-03-26 ENCOUNTER — APPOINTMENT (OUTPATIENT)
Dept: CARDIAC CATH/INVASIVE PROCEDURES | Age: 44
End: 2024-03-26
Payer: MEDICARE

## 2024-03-26 ENCOUNTER — ANESTHESIA EVENT (OUTPATIENT)
Dept: CARDIAC CATH/INVASIVE PROCEDURES | Age: 44
End: 2024-03-26
Payer: MEDICARE

## 2024-03-26 ENCOUNTER — ANESTHESIA (OUTPATIENT)
Dept: CARDIAC CATH/INVASIVE PROCEDURES | Age: 44
End: 2024-03-26
Payer: MEDICARE

## 2024-03-26 LAB
ALBUMIN SERPL-MCNC: 3.6 G/DL (ref 3.4–5)
ANION GAP SERPL CALCULATED.3IONS-SCNC: 12 MMOL/L (ref 3–16)
BUN SERPL-MCNC: 14 MG/DL (ref 7–20)
CALCIUM SERPL-MCNC: 9.2 MG/DL (ref 8.3–10.6)
CHLORIDE SERPL-SCNC: 101 MMOL/L (ref 99–110)
CO2 SERPL-SCNC: 25 MMOL/L (ref 21–32)
CREAT SERPL-MCNC: 1.1 MG/DL (ref 0.9–1.3)
GFR SERPLBLD CREATININE-BSD FMLA CKD-EPI: 84 ML/MIN/{1.73_M2}
GLUCOSE SERPL-MCNC: 100 MG/DL (ref 70–99)
PHOSPHATE SERPL-MCNC: 3.6 MG/DL (ref 2.5–4.9)
POTASSIUM SERPL-SCNC: 3.7 MMOL/L (ref 3.5–5.1)
SODIUM SERPL-SCNC: 138 MMOL/L (ref 136–145)

## 2024-03-26 PROCEDURE — 92960 CARDIOVERSION ELECTRIC EXT: CPT

## 2024-03-26 PROCEDURE — 80069 RENAL FUNCTION PANEL: CPT

## 2024-03-26 PROCEDURE — 2580000003 HC RX 258: Performed by: NURSE ANESTHETIST, CERTIFIED REGISTERED

## 2024-03-26 PROCEDURE — 6370000000 HC RX 637 (ALT 250 FOR IP): Performed by: INTERNAL MEDICINE

## 2024-03-26 PROCEDURE — 93320 DOPPLER ECHO COMPLETE: CPT

## 2024-03-26 PROCEDURE — B24BZZ4 ULTRASONOGRAPHY OF HEART WITH AORTA, TRANSESOPHAGEAL: ICD-10-PCS | Performed by: INTERNAL MEDICINE

## 2024-03-26 PROCEDURE — 3700000000 HC ANESTHESIA ATTENDED CARE: Performed by: ANESTHESIOLOGY

## 2024-03-26 PROCEDURE — 6360000002 HC RX W HCPCS: Performed by: NURSE ANESTHETIST, CERTIFIED REGISTERED

## 2024-03-26 PROCEDURE — 93312 ECHO TRANSESOPHAGEAL: CPT

## 2024-03-26 PROCEDURE — 92960 CARDIOVERSION ELECTRIC EXT: CPT | Performed by: INTERNAL MEDICINE

## 2024-03-26 PROCEDURE — 2060000000 HC ICU INTERMEDIATE R&B

## 2024-03-26 PROCEDURE — 93005 ELECTROCARDIOGRAM TRACING: CPT | Performed by: INTERNAL MEDICINE

## 2024-03-26 PROCEDURE — 6360000002 HC RX W HCPCS: Performed by: INTERNAL MEDICINE

## 2024-03-26 PROCEDURE — 2580000003 HC RX 258: Performed by: INTERNAL MEDICINE

## 2024-03-26 PROCEDURE — 93312 ECHO TRANSESOPHAGEAL: CPT | Performed by: INTERNAL MEDICINE

## 2024-03-26 PROCEDURE — 93325 DOPPLER ECHO COLOR FLOW MAPG: CPT

## 2024-03-26 PROCEDURE — 3700000001 HC ADD 15 MINUTES (ANESTHESIA): Performed by: ANESTHESIOLOGY

## 2024-03-26 PROCEDURE — 2500000003 HC RX 250 WO HCPCS: Performed by: INTERNAL MEDICINE

## 2024-03-26 PROCEDURE — 36415 COLL VENOUS BLD VENIPUNCTURE: CPT

## 2024-03-26 RX ORDER — AMIODARONE HYDROCHLORIDE 200 MG/1
400 TABLET ORAL 3 TIMES DAILY
Status: DISCONTINUED | OUTPATIENT
Start: 2024-03-26 | End: 2024-03-29 | Stop reason: HOSPADM

## 2024-03-26 RX ORDER — PROPOFOL 10 MG/ML
INJECTION, EMULSION INTRAVENOUS PRN
Status: DISCONTINUED | OUTPATIENT
Start: 2024-03-26 | End: 2024-03-26 | Stop reason: SDUPTHER

## 2024-03-26 RX ORDER — AMIODARONE HYDROCHLORIDE 150 MG/3ML
INJECTION, SOLUTION INTRAVENOUS PRN
Status: DISCONTINUED | OUTPATIENT
Start: 2024-03-26 | End: 2024-03-26 | Stop reason: SDUPTHER

## 2024-03-26 RX ORDER — PROPOFOL 10 MG/ML
INJECTION, EMULSION INTRAVENOUS CONTINUOUS PRN
Status: DISCONTINUED | OUTPATIENT
Start: 2024-03-26 | End: 2024-03-26 | Stop reason: SDUPTHER

## 2024-03-26 RX ORDER — SODIUM CHLORIDE, SODIUM LACTATE, POTASSIUM CHLORIDE, CALCIUM CHLORIDE 600; 310; 30; 20 MG/100ML; MG/100ML; MG/100ML; MG/100ML
INJECTION, SOLUTION INTRAVENOUS CONTINUOUS PRN
Status: DISCONTINUED | OUTPATIENT
Start: 2024-03-26 | End: 2024-03-26 | Stop reason: SDUPTHER

## 2024-03-26 RX ORDER — ESMOLOL HYDROCHLORIDE 10 MG/ML
INJECTION INTRAVENOUS PRN
Status: DISCONTINUED | OUTPATIENT
Start: 2024-03-26 | End: 2024-03-26 | Stop reason: SDUPTHER

## 2024-03-26 RX ORDER — KETOROLAC TROMETHAMINE 30 MG/ML
INJECTION, SOLUTION INTRAMUSCULAR; INTRAVENOUS PRN
Status: DISCONTINUED | OUTPATIENT
Start: 2024-03-26 | End: 2024-03-26 | Stop reason: SDUPTHER

## 2024-03-26 RX ADMIN — DILTIAZEM HYDROCHLORIDE 5 MG/HR: 5 INJECTION, SOLUTION INTRAVENOUS at 02:52

## 2024-03-26 RX ADMIN — AMIODARONE HYDROCHLORIDE 0.5 MG/MIN: 50 INJECTION, SOLUTION INTRAVENOUS at 21:49

## 2024-03-26 RX ADMIN — KETOROLAC TROMETHAMINE 30 MG: 30 INJECTION, SOLUTION INTRAMUSCULAR; INTRAVENOUS at 10:26

## 2024-03-26 RX ADMIN — AMIODARONE HYDROCHLORIDE 400 MG: 200 TABLET ORAL at 21:54

## 2024-03-26 RX ADMIN — FUROSEMIDE 60 MG: 10 INJECTION, SOLUTION INTRAMUSCULAR; INTRAVENOUS at 18:05

## 2024-03-26 RX ADMIN — SODIUM CHLORIDE, PRESERVATIVE FREE 10 ML: 5 INJECTION INTRAVENOUS at 12:12

## 2024-03-26 RX ADMIN — FUROSEMIDE 60 MG: 10 INJECTION, SOLUTION INTRAMUSCULAR; INTRAVENOUS at 12:12

## 2024-03-26 RX ADMIN — PROPOFOL 200 MCG/KG/MIN: 10 INJECTION, EMULSION INTRAVENOUS at 09:59

## 2024-03-26 RX ADMIN — LISINOPRIL 10 MG: 10 TABLET ORAL at 12:11

## 2024-03-26 RX ADMIN — ESMOLOL HYDROCHLORIDE 10 MG: 10 INJECTION, SOLUTION INTRAVENOUS at 10:25

## 2024-03-26 RX ADMIN — Medication 3 MG: at 21:54

## 2024-03-26 RX ADMIN — AMIODARONE HYDROCHLORIDE 400 MG: 200 TABLET ORAL at 16:25

## 2024-03-26 RX ADMIN — ESMOLOL HYDROCHLORIDE 10 MG: 10 INJECTION, SOLUTION INTRAVENOUS at 10:22

## 2024-03-26 RX ADMIN — METOPROLOL SUCCINATE 100 MG: 100 TABLET, EXTENDED RELEASE ORAL at 12:11

## 2024-03-26 RX ADMIN — AMIODARONE HYDROCHLORIDE 150 MG: 50 INJECTION, SOLUTION INTRAVENOUS at 10:20

## 2024-03-26 RX ADMIN — SODIUM CHLORIDE, SODIUM LACTATE, POTASSIUM CHLORIDE, AND CALCIUM CHLORIDE: .6; .31; .03; .02 INJECTION, SOLUTION INTRAVENOUS at 09:35

## 2024-03-26 RX ADMIN — RIVAROXABAN 20 MG: 20 TABLET, FILM COATED ORAL at 18:05

## 2024-03-26 RX ADMIN — AMIODARONE HYDROCHLORIDE 1 MG/MIN: 50 INJECTION, SOLUTION INTRAVENOUS at 16:33

## 2024-03-26 RX ADMIN — SPIRONOLACTONE 25 MG: 25 TABLET ORAL at 12:11

## 2024-03-26 RX ADMIN — PROPOFOL 50 MG: 10 INJECTION, EMULSION INTRAVENOUS at 09:59

## 2024-03-26 ASSESSMENT — PAIN SCALES - GENERAL
PAINLEVEL_OUTOF10: 0

## 2024-03-26 NOTE — H&P
[facility]          Electrophysiology H&P Note       Date of Procedure: 3/26/2024  Patient's Name: Gabriel Maya  YOB: 1980   Medical Record Number: [id number]    Indication:  Atrial fibrillation with RVR  Cardiomyopathy with severely reduced LVEF, decompensated heart failure    Consent:   I have discussed with the patient and/or the patient representative the indication, alternatives, and the possible risks and/or complications of the planned procedure and the anesthesia methods. The patient and/or patient representative appear to understand and agree to proceed.    Past Medical History:   Diagnosis Date    History of bipolar disorder & PTSD 7/27/2021    Mixed hyperlipidemia 7/29/2021       Past Surgical History:   Procedure Laterality Date    INGUINAL HERNIA REPAIR Bilateral     WISDOM TOOTH EXTRACTION         Prior to Admission medications    Medication Sig Start Date End Date Taking? Authorizing Provider   ibuprofen (ADVIL;MOTRIN) 600 MG tablet Take 1 tablet by mouth every 6 hours as needed for Pain 7/7/19 12/18/20  Jeanie Bauer MD         Pre-sedation Documentation and Exam:  I have personally completed a history, physical exam & review of systems for this patient (see notes).    BP (!) 106/51   Pulse (!) 126   Temp 97.9 °F (36.6 °C) (Oral)   Resp 20   Ht 1.753 m (5' 9\")   Wt 130.3 kg (287 lb 4.2 oz)   SpO2 98%   BMI 42.42 kg/m²   NAD  Chest clear non labored  Heart tachycardic, irregular rhythm  Abd soft non tender  No focal neuro deficits  Appropriate mood and affect       Mallampati Airway Assessment:  II     ASA Classification:  Class III - A patient with severe systemic disease that limits activity but is not incapacitating      Sedation/Anesthesia Plan:  Per anesthesia     Medications Planned:  Per anesthesia        Gian Arrington MD  [facility]   Office: [phone]  Fax: [phone]

## 2024-03-26 NOTE — PROGRESS NOTES
Patient started on amio gtt. Will continue to monitor. Electronically signed by Guerline Lafleur RN on 3/26/2024 at 4:37 PM

## 2024-03-26 NOTE — PROCEDURES
[facility]     Electrophysiology Procedure Note       Date of Procedure: 3/26/2024  Patient's Name: Gabriel Maya  YOB: 1980   Medical Record Number: [id number]  Procedure Performed by: Gian Arrington MD    Procedures performed:  External Electrical cardioversion   Transesophageal echo  Sedation provided by anesthesia    Indication of the procedure:   Atrial fibrillation with RVR  Cardiomyopathy with severely reduced LVEF, decompensated heart failure    Details of procedure:   The patient was brought to the cath lab area in a fasting and non-sedated state. The risks, benefits and alternatives of the procedure were discussed with the patient. The patient opted to proceed with the procedure. Written informed consent was signed and placed in the chart.  A timeout protocol was completed to identify the patient and the procedure being performed.     Sedation   Sedation was provided by anesthesia     LINH  Severely reduced LVEF with biventricular dysfunction  Atrial fibrillation with RVR  No left atrial appendage thrombus or mass    Cardioversion  Once adequate sedation was confirmed by anesthesia and electrical DC cardioversion was performed with 200 J, synchronized shock, this initially resulted in restoration of sinus rhythm however was shortly followed by atrial ectopy and resumption of atrial fibrillation, 150 mg of amiodarone was administered as an IV push and cardioversion was repeated again using 200 J, synchronized shock, resulting in restoration of sinus rhythm only briefly.  Anesthesia team recommended esmolol for heart rates, a total of 20 mg was given in divided doses.  A final electrical cardioversion using 200 J, synchronized shock was administered again with brief restoration of sinus rhythm followed by resumption of atrial fibrillation.    The patient tolerated the procedure well and there were no complications.     Assessment:   Successful restoration of sinus rhythm  with cardioversion with near immediate resumption of atrial fibrillation. Sinus rhythm could not be sustained despite multiple cardioversions, esmolol and amiodarone bolus  LINH demonstrated severe biventricular dysfunction, no left atrial appendage mass or thrombus    Plan:  Continue uninterrupted anticoagulation  Start amiodarone PO, 400 mg 3 times daily  Continue metoprolol for rate control  Diuresis  GDMT for heart failure, treat hypertension  Attempt cardioversion in 48 hours following administration of amiodarone     Gian Arrington MD  [facility]   Office: [phone]  Fax: [phone]

## 2024-03-26 NOTE — PLAN OF CARE
Problem: Discharge Planning  Goal: Discharge to home or other facility with appropriate resources  Outcome: Progressing  Flowsheets (Taken 3/26/2024 0127)  Discharge to home or other facility with appropriate resources:   Identify barriers to discharge with patient and caregiver   Arrange for needed discharge resources and transportation as appropriate   Identify discharge learning needs (meds, wound care, etc)     Problem: Pain  Goal: Verbalizes/displays adequate comfort level or baseline comfort level  Outcome: Progressing  Flowsheets (Taken 3/26/2024 0127)  Verbalizes/displays adequate comfort level or baseline comfort level:   Encourage patient to monitor pain and request assistance   Administer analgesics based on type and severity of pain and evaluate response   Assess pain using appropriate pain scale   Implement non-pharmacological measures as appropriate and evaluate response     Problem: Safety - Adult  Goal: Free from fall injury  Outcome: Progressing  Flowsheets (Taken 3/26/2024 0127)  Free From Fall Injury:   Instruct family/caregiver on patient safety   Based on caregiver fall risk screen, instruct family/caregiver to ask for assistance with transferring infant if caregiver noted to have fall risk factors     Problem: Cardiovascular - Adult  Goal: Maintains optimal cardiac output and hemodynamic stability  Outcome: Progressing  Flowsheets (Taken 3/26/2024 0127)  Maintains optimal cardiac output and hemodynamic stability:   Monitor blood pressure and heart rate   Assess for signs of decreased cardiac output   Administer vasoactive medications as ordered   Monitor urine output and notify Licensed Independent Practitioner for values outside of normal range   Administer fluid and/or volume expanders as ordered   For PPHN infants, administer sedation as ordered and minimize all controllable stressors.     Problem: Cardiovascular - Adult  Goal: Absence of cardiac dysrhythmias or at baseline  Outcome:

## 2024-03-26 NOTE — ANESTHESIA PRE PROCEDURE
Department of Anesthesiology  Preprocedure Note       Name:  Gabriel Maya   Age:  43 y.o.  :  1980                                          MRN:  [id number]         Date:  3/26/2024      Surgeon: * Surgery not found *    Procedure:     Medications prior to admission:   Prior to Admission medications    Medication Sig Start Date End Date Taking? Authorizing Provider   ibuprofen (ADVIL;MOTRIN) 600 MG tablet Take 1 tablet by mouth every 6 hours as needed for Pain 19  Jeanie Bauer MD       Current medications:    Current Facility-Administered Medications   Medication Dose Route Frequency Provider Last Rate Last Admin   • dilTIAZem 125 mg in sodium chloride 0.9 % 125 mL infusion  2.5-15 mg/hr IntraVENous Continuous Gian Arrington MD 5 mL/hr at 24 0252 5 mg/hr at 24   • sodium chloride flush 0.9 % injection 5-40 mL  5-40 mL IntraVENous 2 times per day Kimi Contreras MD   10 mL at 24   • sodium chloride flush 0.9 % injection 5-40 mL  5-40 mL IntraVENous PRN Kimi Contreras MD       • 0.9 % sodium chloride infusion   IntraVENous PRN Kimi Contreras MD       • potassium chloride (KLOR-CON M) extended release tablet 40 mEq  40 mEq Oral PRN Kimi Contreras MD        Or   • potassium bicarb-citric acid (EFFER-K) effervescent tablet 40 mEq  40 mEq Oral PRN Kimi Contreras MD        Or   • potassium chloride 10 mEq/100 mL IVPB (Peripheral Line)  10 mEq IntraVENous PRN Kimi Contreras MD       • magnesium sulfate 2000 mg in 50 mL IVPB premix  2,000 mg IntraVENous PRN Kimi Contreras MD       • ondansetron (ZOFRAN-ODT) disintegrating tablet 4 mg  4 mg Oral Q8H PRN Kimi Contreras MD        Or   • ondansetron (ZOFRAN) injection 4 mg  4 mg IntraVENous Q6H PRN Kimi Contreras MD       • senna (SENOKOT) tablet 8.6 mg  1 tablet Oral Daily PRN Kimi Contreras MD       • aluminum & magnesium hydroxide-simethicone (MAALOX)

## 2024-03-26 NOTE — PLAN OF CARE
Problem: Discharge Planning  Goal: Discharge to home or other facility with appropriate resources  Outcome: Progressing  Flowsheets (Taken 3/26/2024 1821)  Discharge to home or other facility with appropriate resources:   Identify barriers to discharge with patient and caregiver   Identify discharge learning needs (meds, wound care, etc)   Arrange for needed discharge resources and transportation as appropriate  Note: Patient is on Dilt gtt @ 5 and amio gtt going @ 33.3 Patient will be leaving once his conversion to sinus is complete or after next attempt to cardiovert. Patient is set to cardiovert Thursday and if unsuccessful will discharge to do outpatient ablation      Problem: Pain  Goal: Verbalizes/displays adequate comfort level or baseline comfort level  Outcome: Progressing  Flowsheets (Taken 3/26/2024 1821)  Verbalizes/displays adequate comfort level or baseline comfort level:   Encourage patient to monitor pain and request assistance   Assess pain using appropriate pain scale  Note: No complaint of pain during this shift     Problem: Safety - Adult  Goal: Free from fall injury  Outcome: Progressing  Flowsheets (Taken 3/26/2024 1821)  Free From Fall Injury: Instruct family/caregiver on patient safety     Problem: Respiratory - Adult  Goal: Achieves optimal ventilation and oxygenation  Outcome: Progressing  Flowsheets (Taken 3/26/2024 1821)  Achieves optimal ventilation and oxygenation:   Assess for changes in respiratory status   Assess for changes in mentation and behavior     Problem: Cardiovascular - Adult  Goal: Maintains optimal cardiac output and hemodynamic stability  Outcome: Progressing  Flowsheets (Taken 3/26/2024 1821)  Maintains optimal cardiac output and hemodynamic stability:   Monitor blood pressure and heart rate   Assess for signs of decreased cardiac output   Monitor urine output and notify Licensed Independent Practitioner for values outside of normal range  Goal: Absence of cardiac  dysrhythmias or at baseline  Outcome: Progressing  Flowsheets (Taken 3/26/2024 1821)  Absence of cardiac dysrhythmias or at baseline:   Monitor cardiac rate and rhythm   Assess for signs of decreased cardiac output  Note: Patient is still a flutter... waiting for flip to normal sinus      Problem: Metabolic/Fluid and Electrolytes - Adult  Goal: Electrolytes maintained within normal limits  Outcome: Progressing  Flowsheets (Taken 3/26/2024 1821)  Electrolytes maintained within normal limits:   Monitor labs and assess patient for signs and symptoms of electrolyte imbalances   Administer electrolyte replacement as ordered

## 2024-03-26 NOTE — CARE COORDINATION
CM following for DCP- pt IPTA, from home w spouse. No CM needs anticipated prior to DC. Will need IMM. Per EP note today, possible cardioversion in 48hrs following administration of amio.    Thank you  Ella Bobby RN, BSN, CM  PCU   [phone]

## 2024-03-26 NOTE — PROGRESS NOTES
V2.0    [facility] [place] Progress Note      Name:  Gabriel Maya /Age/Sex: 1980  (43 y.o. male)   MRN & CSN:  [id number] & [id number] Encounter Date/Time: 3/26/2024 9:31 AM EDT   Location:  [facility], formerly [facility] PCP: Joe Perla DO     Attending:David Ponce MD       Hospital Day: 4    Assessment and Recommendations       Assessment/Plan:     New onset A-fib with RVR:  Reviewed note from EP cardiology  Cardioversion scheduled for today  Patient's CHADS2 score 2  Patient's been started on Xarelto    Newly diagnosed systolic reduced EF ( 20 to 25%):  Echo shows global hypokinesis/cardiomyopathy  Current medications Lasix 60 mg IV every 12  While on Lasix IV will need to continue to monitor renal function  Additionally patient's been started on lisinopril 10 mg daily metoprolol 100 mg daily and spironolactone 25 mg daily  Over the last 2 days patient had 4 L of diuresis on the 24th and another 0.87 L of diuresis over the last 24 hours    Suspected sleep apnea:  Will need sleep study on outpatient basis      Disposition: Eventual discharge home should not have needs          Diet ADULT DIET; Regular; Low Fat/Low Chol/High Fiber/2 gm Na   DVT Prophylaxis [] Lovenox, []  Heparin, [] SCDs, [] Ambulation,  [] Eliquis, [x] Xarelto  [] Coumadin   Code Status Full Code       Surrogate Decision Maker/ POA      Personally reviewed Lab Studies and Imaging           Subjective:     Chief Complaint: Shortness of breath    Gabriel Maya  is a 42-year-old male with no significant past medical history who is transferred from [facility] to [facility] for further care management  Patient presented to the emergency room with upper respiratory symptoms which has been going on for 1 week shortness of breath worsening on exertion for the last 2 days vomiting and chest heaviness which started today.  Patient denies any loss of consciousness no diaphoresis no previous history of cardiac disease.  Workup in the  Normal cardiomediastinal silhouette. No consolidation, pleural effusion, or pneumothorax. No acute osseous abnormality.     No acute cardiopulmonary findings. Electronically signed by Jean Carlos Ryan MD      CBC:   Recent Labs     03/23/24 1925 03/25/24  0702   WBC 12.5* 10.0   HGB 15.6 14.5    190     BMP:    Recent Labs     03/23/24 1925 03/25/24  0702    141   K 3.8 3.5    104   CO2 21 24   BUN 14 14   CREATININE 1.2 1.1   GLUCOSE 119* 111*     Hepatic:   Recent Labs     03/23/24 1925   AST 29   ALT 14   BILITOT 1.3*   ALKPHOS 53     Lipids:   Lab Results   Component Value Date/Time    HDL 44 07/28/2021 02:35 PM     Hemoglobin A1C:   Lab Results   Component Value Date/Time    LABA1C 5.2 07/28/2021 02:35 PM     TSH: No results found for: \"TSH\"  Troponin: No results found for: \"TROPONINT\"  Lactic Acid:   Recent Labs     03/25/24  0702   LACTA 1.2     BNP:   Recent Labs     03/23/24 1925   PROBNP 4,428*     UA:  Lab Results   Component Value Date/Time    NITRU Negative 03/23/2024 07:36 PM    COLORU Yellow 03/23/2024 07:36 PM    PHUR 5.5 03/23/2024 07:36 PM    WBCUA 3-5 03/23/2024 07:36 PM    RBCUA 0-2 03/23/2024 07:36 PM    MUCUS 3+ 03/23/2024 07:36 PM    BACTERIA 2+ 03/23/2024 07:36 PM    CLARITYU Clear 03/23/2024 07:36 PM    SPECGRAV 1.020 03/23/2024 07:36 PM    LEUKOCYTESUR Negative 03/23/2024 07:36 PM    UROBILINOGEN 0.2 03/23/2024 07:36 PM    BILIRUBINUR Negative 03/23/2024 07:36 PM    BLOODU TRACE-INTACT 03/23/2024 07:36 PM    GLUCOSEU Negative 03/23/2024 07:36 PM    KETUA Negative 03/23/2024 07:36 PM    AMORPHOUS 1+ 03/23/2024 07:36 PM     Urine Cultures: No results found for: \"LABURIN\"  Blood Cultures:   Lab Results   Component Value Date/Time    BC  03/23/2024 07:36 PM     No Growth to date.  Any change in status will be called.     Lab Results   Component Value Date/Time    BLOODCULT2  03/23/2024 07:36 PM     No Growth to date.  Any change in status will be called.     Organism: No results

## 2024-03-26 NOTE — PROGRESS NOTES
Cath Lab Pre Procedure Flowsheet    Plan of Care:     Hemodynamics and cardiac rhythm will remain stable.   Comfort level will be maintained.   Respiratory function will remain adequate.   Pt/family will verbalize understanding of the procedure.   Procedure will be tolerated without complications.   Patient will recover from procedure without complications.   ID armband on patient and identification verified.   Informed consent obtained.   Non invasive blood pressure cuff applied, monitoring initiated.   EKG pads and pulse oximeter applied, monitoring initiated.   Instructions given. Patient and / or family verbalize understanding.   H&P will be documented by physician in [facility].     Pre-procedure:    NPO Status: Pt has been NPO since midnight. .    Contrast / IV Dye Allergy:     Pregnancy Test: N/A.    Prep Sites: Wrist(s)  Chest    Jovany's Test:    Pulses:     Anticoagulants: xarelto 3/25.     Antiplatelets: None.     Chief Complaint:  Dyspnea on Exertion    Diabetic: No    Pre EKG Rhythm: afib    Pre SBP:162/116    IV access: right wrist    Pre-procedure blood work collected by:     NIH Scale:Cath Lab Pre Procedure Flowsheet    Plan of Care:     Hemodynamics and cardiac rhythm will remain stable.   Comfort level will be maintained.   Respiratory function will remain adequate.   Pt/family will verbalize understanding of the procedure.   Procedure will be tolerated without complications.   Patient will recover from procedure without complications.   ID armband on patient and identification verified.   Informed consent obtained.   Non invasive blood pressure cuff applied, monitoring initiated.   EKG pads and pulse oximeter applied, monitoring initiated.   Instructions given. Patient and / or family verbalize understanding.   H&P will be documented by physician in [facility].     Pre-procedure:    NPO Status: Pt has been NPO since midnight. .    Contrast / IV Dye Allergy:     Pregnancy Test: N/A.    Prep Sites:

## 2024-03-26 NOTE — PROGRESS NOTES
CATH LAB PROCEDURE LOG - TRANSESOPHAGEAL ECHOCARDIOGRAM, POSSIBLE CARDIOVERSION    PRE Procedure:      Pt arrived to Cath Lab.   Plan of Care: Hemodynamics and cardiac rhythm will remain stable. Comfort level will be maintained. Respiratory function will remain adequate. Pt/family will verbalize understanding of the procedure. Procedure will be tolerated without complications. Patient will recover from procedure without complications.   ID armband on patient and identification verified.   Informed consent obtained.   Non invasive blood pressure cuff applied, monitoring initiated.   EKG pads and pulse oximeter applied, monitoring initiated.   Instructions given. Patient and / or family verbalize understanding.   H&P will be documented by physician in Epic.     Pt has been NPO since midnight.     Post DCCV EKG ordered? Yes    Defibrillator pads applied to chest.       Timeout and Fire Safety completed at 9:58 AM.     Correct patient verified.   Members of the surgical team/visitors introduced.   Allergies announced.   Correct procedure verified.   Correct procedure site verified.   Consents verified.   Implant equipment, additional services, special requirements available.   Medications labeled and available.   Appropriate pre medications have been administered.     Alcohol prep solution had sufficient time to dissipate if used. Yes=1, No=0  Surgical site or incision above the xyphoid. Yes=1, No=0  Open oxygen source. Yes=1, No=0  Available ignition source. Yes=1, No=0  Score total - 1.       Procedure Medication:     9:58 AM Cetacaine spray administered by Dr schreiber      Anesthesia Sedatin:59 AM Anesthesia sedates patient, refer to anesthesia encounter.    Doctor Sedating:        Transesophageal Echocardiogram:    10:08 AM LINH probe passed and images obtained.             10:14 AM LINH probe removed without incident.       Cardioversion:    Anesthesia Sedating:  10:14 AM Pt remains sedated by anesthesia for

## 2024-03-26 NOTE — ANESTHESIA POSTPROCEDURE EVALUATION
Department of Anesthesiology  Postprocedure Note    Patient: Gabriel Maya  MRN: [id number]  YOB: 1980  Date of evaluation: 3/26/2024    Procedure Summary       Date: 03/26/24 Room / Location: [facility] Cath Lab    Anesthesia Start: 0958 Anesthesia Stop:     Procedure: [facility] LINH W ECHO AND ANES Diagnosis:     Scheduled Providers:  Responsible Provider:     Anesthesia Type: MAC ASA Status: 3 - Emergent            Anesthesia Type: No value filed.    Heber Phase I:      Heber Phase II:      Anesthesia Post Evaluation    Patient location during evaluation: PACU  Patient participation: waiting for patient participation  Level of consciousness: responsive to verbal stimuli  Pain score: 0  Airway patency: patent  Nausea & Vomiting: no nausea and no vomiting  Cardiovascular status: tachycardic  Respiratory status: face mask  Hydration status: stable  Comments: Remains tachycardic despite pharmacologic and cardioversion  Multimodal analgesia pain management approach  Pain management: adequate    No notable events documented.

## 2024-03-27 LAB
ALBUMIN SERPL-MCNC: 3.7 G/DL (ref 3.4–5)
ANION GAP SERPL CALCULATED.3IONS-SCNC: 16 MMOL/L (ref 3–16)
ANTI-XA UNFRAC HEPARIN: >1.1 IU/ML (ref 0.3–0.7)
APTT BLD: 23.9 SEC (ref 22.7–35.9)
APTT BLD: 38.5 SEC (ref 22.7–35.9)
BUN SERPL-MCNC: 18 MG/DL (ref 7–20)
CALCIUM SERPL-MCNC: 9.3 MG/DL (ref 8.3–10.6)
CHLORIDE SERPL-SCNC: 100 MMOL/L (ref 99–110)
CO2 SERPL-SCNC: 20 MMOL/L (ref 21–32)
CREAT SERPL-MCNC: 1.4 MG/DL (ref 0.9–1.3)
DEPRECATED RDW RBC AUTO: 14.6 % (ref 12.4–15.4)
EKG ATRIAL RATE: 288 BPM
EKG DIAGNOSIS: NORMAL
EKG Q-T INTERVAL: 366 MS
EKG QRS DURATION: 90 MS
EKG QTC CALCULATION (BAZETT): 517 MS
EKG R AXIS: -66 DEGREES
EKG T AXIS: 166 DEGREES
EKG VENTRICULAR RATE: 120 BPM
GFR SERPLBLD CREATININE-BSD FMLA CKD-EPI: 63 ML/MIN/{1.73_M2}
GLUCOSE SERPL-MCNC: 126 MG/DL (ref 70–99)
HCT VFR BLD AUTO: 49.3 % (ref 40.5–52.5)
HGB BLD-MCNC: 16.4 G/DL (ref 13.5–17.5)
MCH RBC QN AUTO: 30.3 PG (ref 26–34)
MCHC RBC AUTO-ENTMCNC: 33.3 G/DL (ref 31–36)
MCV RBC AUTO: 90.8 FL (ref 80–100)
PHOSPHATE SERPL-MCNC: 3.9 MG/DL (ref 2.5–4.9)
PLATELET # BLD AUTO: 261 K/UL (ref 135–450)
PMV BLD AUTO: 10.1 FL (ref 5–10.5)
POTASSIUM SERPL-SCNC: 3.9 MMOL/L (ref 3.5–5.1)
RBC # BLD AUTO: 5.43 M/UL (ref 4.2–5.9)
SODIUM SERPL-SCNC: 136 MMOL/L (ref 136–145)
TSH SERPL DL<=0.005 MIU/L-ACNC: 4.7 UIU/ML (ref 0.27–4.2)
WBC # BLD AUTO: 12.1 K/UL (ref 4–11)

## 2024-03-27 PROCEDURE — 93010 ELECTROCARDIOGRAM REPORT: CPT | Performed by: INTERNAL MEDICINE

## 2024-03-27 PROCEDURE — 84439 ASSAY OF FREE THYROXINE: CPT

## 2024-03-27 PROCEDURE — 6360000002 HC RX W HCPCS: Performed by: INTERNAL MEDICINE

## 2024-03-27 PROCEDURE — 80069 RENAL FUNCTION PANEL: CPT

## 2024-03-27 PROCEDURE — 36415 COLL VENOUS BLD VENIPUNCTURE: CPT

## 2024-03-27 PROCEDURE — 2060000000 HC ICU INTERMEDIATE R&B

## 2024-03-27 PROCEDURE — 85027 COMPLETE CBC AUTOMATED: CPT

## 2024-03-27 PROCEDURE — 99233 SBSQ HOSP IP/OBS HIGH 50: CPT | Performed by: NURSE PRACTITIONER

## 2024-03-27 PROCEDURE — 2580000003 HC RX 258: Performed by: INTERNAL MEDICINE

## 2024-03-27 PROCEDURE — 84443 ASSAY THYROID STIM HORMONE: CPT

## 2024-03-27 PROCEDURE — 85730 THROMBOPLASTIN TIME PARTIAL: CPT

## 2024-03-27 PROCEDURE — 6370000000 HC RX 637 (ALT 250 FOR IP): Performed by: INTERNAL MEDICINE

## 2024-03-27 PROCEDURE — 85520 HEPARIN ASSAY: CPT

## 2024-03-27 PROCEDURE — 99255 IP/OBS CONSLTJ NEW/EST HI 80: CPT | Performed by: INTERNAL MEDICINE

## 2024-03-27 PROCEDURE — 2500000003 HC RX 250 WO HCPCS: Performed by: INTERNAL MEDICINE

## 2024-03-27 PROCEDURE — 6370000000 HC RX 637 (ALT 250 FOR IP): Performed by: NURSE PRACTITIONER

## 2024-03-27 RX ORDER — HEPARIN SODIUM 1000 [USP'U]/ML
2000 INJECTION, SOLUTION INTRAVENOUS; SUBCUTANEOUS PRN
Status: DISCONTINUED | OUTPATIENT
Start: 2024-03-27 | End: 2024-03-29 | Stop reason: HOSPADM

## 2024-03-27 RX ORDER — HEPARIN SODIUM 10000 [USP'U]/100ML
5-30 INJECTION, SOLUTION INTRAVENOUS CONTINUOUS
Status: DISCONTINUED | OUTPATIENT
Start: 2024-03-27 | End: 2024-03-29 | Stop reason: HOSPADM

## 2024-03-27 RX ORDER — HEPARIN SODIUM 1000 [USP'U]/ML
4000 INJECTION, SOLUTION INTRAVENOUS; SUBCUTANEOUS ONCE
Status: COMPLETED | OUTPATIENT
Start: 2024-03-27 | End: 2024-03-27

## 2024-03-27 RX ORDER — HEPARIN SODIUM 1000 [USP'U]/ML
4000 INJECTION, SOLUTION INTRAVENOUS; SUBCUTANEOUS PRN
Status: DISCONTINUED | OUTPATIENT
Start: 2024-03-27 | End: 2024-03-29 | Stop reason: HOSPADM

## 2024-03-27 RX ADMIN — SODIUM CHLORIDE, PRESERVATIVE FREE 10 ML: 5 INJECTION INTRAVENOUS at 09:57

## 2024-03-27 RX ADMIN — FUROSEMIDE 60 MG: 10 INJECTION, SOLUTION INTRAMUSCULAR; INTRAVENOUS at 17:38

## 2024-03-27 RX ADMIN — AMIODARONE HYDROCHLORIDE 0.5 MG/MIN: 50 INJECTION, SOLUTION INTRAVENOUS at 02:23

## 2024-03-27 RX ADMIN — AMIODARONE HYDROCHLORIDE 400 MG: 200 TABLET ORAL at 15:20

## 2024-03-27 RX ADMIN — FUROSEMIDE 60 MG: 10 INJECTION, SOLUTION INTRAMUSCULAR; INTRAVENOUS at 09:57

## 2024-03-27 RX ADMIN — AMIODARONE HYDROCHLORIDE 400 MG: 200 TABLET ORAL at 09:57

## 2024-03-27 RX ADMIN — AMIODARONE HYDROCHLORIDE 400 MG: 200 TABLET ORAL at 20:56

## 2024-03-27 RX ADMIN — HEPARIN SODIUM 7 UNITS/KG/HR: 10000 INJECTION, SOLUTION INTRAVENOUS at 17:37

## 2024-03-27 RX ADMIN — SALINE NASAL SPRAY 1 SPRAY: 1.5 SOLUTION NASAL at 23:53

## 2024-03-27 RX ADMIN — METOPROLOL SUCCINATE 100 MG: 100 TABLET, EXTENDED RELEASE ORAL at 09:57

## 2024-03-27 RX ADMIN — LISINOPRIL 10 MG: 10 TABLET ORAL at 09:57

## 2024-03-27 RX ADMIN — HEPARIN SODIUM 4000 UNITS: 1000 INJECTION INTRAVENOUS; SUBCUTANEOUS at 17:32

## 2024-03-27 RX ADMIN — AMIODARONE HYDROCHLORIDE 0.5 MG/MIN: 50 INJECTION, SOLUTION INTRAVENOUS at 18:27

## 2024-03-27 RX ADMIN — SPIRONOLACTONE 25 MG: 25 TABLET ORAL at 09:57

## 2024-03-27 RX ADMIN — Medication 3 MG: at 20:58

## 2024-03-27 RX ADMIN — DILTIAZEM HYDROCHLORIDE 5 MG/HR: 5 INJECTION, SOLUTION INTRAVENOUS at 07:30

## 2024-03-27 NOTE — PLAN OF CARE
Problem: Discharge Planning  Goal: Discharge to home or other facility with appropriate resources  3/26/2024 2214 by Laura Scott RN  Outcome: Progressing  Flowsheets (Taken 3/26/2024 2214)  Discharge to home or other facility with appropriate resources:   Identify barriers to discharge with patient and caregiver   Identify discharge learning needs (meds, wound care, etc)   Arrange for needed discharge resources and transportation as appropriate     Problem: Pain  Goal: Verbalizes/displays adequate comfort level or baseline comfort level  3/26/2024 2214 by Laura Scott RN  Outcome: Progressing  Flowsheets (Taken 3/26/2024 2214)  Verbalizes/displays adequate comfort level or baseline comfort level:   Encourage patient to monitor pain and request assistance   Administer analgesics based on type and severity of pain and evaluate response   Assess pain using appropriate pain scale   Implement non-pharmacological measures as appropriate and evaluate response     Problem: Safety - Adult  Goal: Free from fall injury  3/26/2024 2214 by Laura Scott RN  Outcome: Progressing  Flowsheets (Taken 3/26/2024 2214)  Free From Fall Injury:   Instruct family/caregiver on patient safety   Based on caregiver fall risk screen, instruct family/caregiver to ask for assistance with transferring infant if caregiver noted to have fall risk factors     Problem: Respiratory - Adult  Goal: Achieves optimal ventilation and oxygenation  3/26/2024 2214 by Laura Scott RN  Outcome: Progressing  Flowsheets (Taken 3/26/2024 2214)  Achieves optimal ventilation and oxygenation:   Assess for changes in respiratory status   Position to facilitate oxygenation and minimize respiratory effort   Initiate smoking cessation protocol as indicated   Assess the need for suctioning and aspirate as needed   Respiratory therapy support as indicated   Assess for changes in mentation and behavior   Oxygen supplementation based on oxygen saturation or

## 2024-03-27 NOTE — PLAN OF CARE
Problem: Discharge Planning  Goal: Discharge to home or other facility with appropriate resources  Outcome: Progressing  Flowsheets (Taken 3/27/2024 1835)  Discharge to home or other facility with appropriate resources:   Identify barriers to discharge with patient and caregiver   Arrange for needed discharge resources and transportation as appropriate   Identify discharge learning needs (meds, wound care, etc)  Note: Patient going for cardioversion @ 0800 tomorrow morning. NPO @ midnight. Patient received 800 mg PO amio and is still on amio gtt. Dilt discontinued. Heparin gtt started next aptt draw @ 0035     Problem: Pain  Goal: Verbalizes/displays adequate comfort level or baseline comfort level  Outcome: Progressing  Flowsheets (Taken 3/27/2024 1835)  Verbalizes/displays adequate comfort level or baseline comfort level:   Encourage patient to monitor pain and request assistance   Assess pain using appropriate pain scale  Note: No complaints of pain during this shift     Problem: Safety - Adult  Goal: Free from fall injury  Outcome: Progressing  Flowsheets (Taken 3/27/2024 1835)  Free From Fall Injury: Instruct family/caregiver on patient safety  Note: SELENE

## 2024-03-27 NOTE — PROGRESS NOTES
2 times per day    melatonin  3 mg Oral Nightly    metoprolol succinate  100 mg Oral Daily    furosemide  60 mg IntraVENous BID    spironolactone  25 mg Oral Daily    lisinopril  10 mg Oral Daily    rivaroxaban  20 mg Oral Daily     Continuous Infusions:   amiodarone 0.5 mg/min (03/27/24 0223)    dilTIAZem 5 mg/hr (03/27/24 0730)    sodium chloride       PRN Meds:.sodium chloride flush, sodium chloride, potassium chloride **OR** potassium alternative oral replacement **OR** potassium chloride, magnesium sulfate, ondansetron **OR** ondansetron, senna, aluminum & magnesium hydroxide-simethicone, acetaminophen **OR** acetaminophen, benzonatate  Continuous Infusions:   amiodarone 0.5 mg/min (03/27/24 0223)    dilTIAZem 5 mg/hr (03/27/24 0730)    sodium chloride         Intake/Output Summary (Last 24 hours) at 3/27/2024 0825  Last data filed at 3/27/2024 0515  Gross per 24 hour   Intake 1322 ml   Output 1075 ml   Net 247 ml       CBC:   Lab Results   Component Value Date/Time    WBC 10.0 03/25/2024 07:02 AM    HGB 14.5 03/25/2024 07:02 AM     03/25/2024 07:02 AM     BMP:  Lab Results   Component Value Date/Time     03/27/2024 07:21 AM    K 3.9 03/27/2024 07:21 AM    K 3.5 03/25/2024 07:02 AM     03/27/2024 07:21 AM    CO2 20 03/27/2024 07:21 AM    BUN 18 03/27/2024 07:21 AM    CREATININE 1.4 03/27/2024 07:21 AM    GLUCOSE 126 03/27/2024 07:21 AM     INR:   Lab Results   Component Value Date/Time    INR 1.11 03/23/2024 07:25 PM        CARDIAC LABS  ENZYMES:No results for input(s): \"CKMB\", \"CKMBINDEX\", \"TROPONINI\" in the last 72 hours.    Invalid input(s): \"CKTOTAL;3\"  FASTING LIPID PANEL:  Lab Results   Component Value Date/Time    HDL 44 07/28/2021 02:35 PM    LDLCALC 172 07/28/2021 02:35 PM     LIVER PROFILE:  Lab Results   Component Value Date/Time    AST 29 03/23/2024 07:25 PM    AST 35 07/28/2021 02:35 PM    ALT 14 03/23/2024 07:25 PM    ALT 40 07/28/2021 02:35 PM  for atrial fibrillation: Blood pressure control, blood sugar control, healthy diet, minimal alcohol intake, no smoking, activity and exercise, manage stress sleep apnea evaluation and symptoms of a stroke.  - Keep K+ > 4.0 and Mg > 2.0  - Reviewed recent labs  - Sleep study outpatient  - Will make NPO for possible DCCV tomorrow am    CMP/acute sCHF  - Possibly rate related  - HsTrops WNL  - BNP > 4000  - EF 20-25%  - NYHA class  - QRS 90  - On lisinopril 10 mg QD, Toprol  mg QD, spironolactone 25 mg QD  - On lasix 60 mg BID - neg 4.5 L  - Cr 1.4  - Will have patient seen by Dr. Torres for CHF optimization  -  consult for outpatient resources as he has no insurance      Abram Gardner [facility]    I spent a total of 53 minutes in care of the patient and greater than 50% of the time was spent counseling with Gabriel Maya and coordinating care regarding their diagnosis, treatments and plan of care.

## 2024-03-27 NOTE — PROGRESS NOTES
Pharmacy Progress Note    Heparin LOW dose weight based infusion is ordered for patient.  Per chart review, patient has received an oral Factor Xa inhibitor (Rivaroxaban 3/26 @ 1806) within the last 72 hours.  As oral Factor Xa inhibitors can impact the anti-Xa test calibrated to unfractionated heparin, Heparin infusion will be monitored and adjusted using aPTT's per [facility] Policy.    APTT algorithm:  Maximum initial infusion rate = 1000 units/hr     aPTT < 59         Heparin 60 units/kg bolus   Increase infusion by 4 units/kg/hr                             (maximum 4,000 units)   aPTT 59-72.9    Heparin 30 units/kg bolus   Increase infusion by 2 units/kg/hr                             (maximum 2,000 units)   aPTT      No bolus                              No change   aPTT 102.1-109       No bolus                       Decrease infusion by 1 unit/kg/hr   APTT 109.1-122.9    No bolus  Decrease infusion by 2 units/kg/hr   aPTT  > 123     Hold heparin for 1 hour         Decrease infusion by 3 units/kg/hr     Obtain aPTT 6 hours after initial bolus and 6 hours after any dose change until two consecutive therapeutic aPTTs are achieved - then daily.       Pharmacy will monitor daily to assist with adjustments & ordering of labs as needed.  If patient remains on heparin infusion 72 hours from last dose of oral factor Xa inhibitor, pharmacy will change infusion back to usual monitoring via the Anti-Xa algorithm per [facility] Policy, as the interaction will no longer occur at that time.    Please call pharmacy with any questions -  Laurence Buckley, [place]  Main Pharmacy: [id number]

## 2024-03-28 ENCOUNTER — ANESTHESIA (OUTPATIENT)
Dept: CARDIAC CATH/INVASIVE PROCEDURES | Age: 44
End: 2024-03-28
Payer: MEDICARE

## 2024-03-28 ENCOUNTER — ANESTHESIA EVENT (OUTPATIENT)
Dept: CARDIAC CATH/INVASIVE PROCEDURES | Age: 44
End: 2024-03-28
Payer: MEDICARE

## 2024-03-28 ENCOUNTER — APPOINTMENT (OUTPATIENT)
Dept: CARDIAC CATH/INVASIVE PROCEDURES | Age: 44
End: 2024-03-28
Payer: MEDICARE

## 2024-03-28 LAB
ANION GAP SERPL CALCULATED.3IONS-SCNC: 15 MMOL/L (ref 3–16)
APTT BLD: 50.1 SEC (ref 22.7–35.9)
APTT BLD: 67.6 SEC (ref 22.7–35.9)
APTT BLD: 88.9 SEC (ref 22.7–35.9)
BACTERIA BLD CULT ORG #2: NORMAL
BACTERIA BLD CULT: NORMAL
BUN SERPL-MCNC: 23 MG/DL (ref 7–20)
CALCIUM SERPL-MCNC: 9.5 MG/DL (ref 8.3–10.6)
CHLORIDE SERPL-SCNC: 98 MMOL/L (ref 99–110)
CO2 SERPL-SCNC: 24 MMOL/L (ref 21–32)
CREAT SERPL-MCNC: 1.6 MG/DL (ref 0.9–1.3)
EKG ATRIAL RATE: 83 BPM
EKG DIAGNOSIS: NORMAL
EKG P AXIS: 24 DEGREES
EKG P-R INTERVAL: 210 MS
EKG Q-T INTERVAL: 470 MS
EKG QRS DURATION: 100 MS
EKG QTC CALCULATION (BAZETT): 552 MS
EKG R AXIS: -63 DEGREES
EKG T AXIS: 38 DEGREES
EKG VENTRICULAR RATE: 83 BPM
GFR SERPLBLD CREATININE-BSD FMLA CKD-EPI: 54 ML/MIN/{1.73_M2}
GLUCOSE SERPL-MCNC: 115 MG/DL (ref 70–99)
INR PPP: 1.38 (ref 0.84–1.16)
POTASSIUM SERPL-SCNC: 4 MMOL/L (ref 3.5–5.1)
PROTHROMBIN TIME: 16.9 SEC (ref 11.5–14.8)
REASON FOR REJECTION: NORMAL
REJECTED TEST: NORMAL
SODIUM SERPL-SCNC: 137 MMOL/L (ref 136–145)
T3FREE SERPL-MCNC: 2.4 PG/ML (ref 2.3–4.2)

## 2024-03-28 PROCEDURE — 36415 COLL VENOUS BLD VENIPUNCTURE: CPT

## 2024-03-28 PROCEDURE — 6360000002 HC RX W HCPCS: Performed by: INTERNAL MEDICINE

## 2024-03-28 PROCEDURE — 2580000003 HC RX 258: Performed by: INTERNAL MEDICINE

## 2024-03-28 PROCEDURE — 2060000000 HC ICU INTERMEDIATE R&B

## 2024-03-28 PROCEDURE — 3700000001 HC ADD 15 MINUTES (ANESTHESIA): Performed by: ANESTHESIOLOGY

## 2024-03-28 PROCEDURE — 93010 ELECTROCARDIOGRAM REPORT: CPT | Performed by: INTERNAL MEDICINE

## 2024-03-28 PROCEDURE — 85610 PROTHROMBIN TIME: CPT

## 2024-03-28 PROCEDURE — 6370000000 HC RX 637 (ALT 250 FOR IP): Performed by: INTERNAL MEDICINE

## 2024-03-28 PROCEDURE — 99233 SBSQ HOSP IP/OBS HIGH 50: CPT | Performed by: INTERNAL MEDICINE

## 2024-03-28 PROCEDURE — 84481 FREE ASSAY (FT-3): CPT

## 2024-03-28 PROCEDURE — 93005 ELECTROCARDIOGRAM TRACING: CPT | Performed by: INTERNAL MEDICINE

## 2024-03-28 PROCEDURE — 2580000003 HC RX 258

## 2024-03-28 PROCEDURE — 92960 CARDIOVERSION ELECTRIC EXT: CPT | Performed by: INTERNAL MEDICINE

## 2024-03-28 PROCEDURE — 80048 BASIC METABOLIC PNL TOTAL CA: CPT

## 2024-03-28 PROCEDURE — 85730 THROMBOPLASTIN TIME PARTIAL: CPT

## 2024-03-28 PROCEDURE — 2500000003 HC RX 250 WO HCPCS

## 2024-03-28 PROCEDURE — 6360000002 HC RX W HCPCS

## 2024-03-28 PROCEDURE — 5A2204Z RESTORATION OF CARDIAC RHYTHM, SINGLE: ICD-10-PCS | Performed by: INTERNAL MEDICINE

## 2024-03-28 PROCEDURE — 3700000000 HC ANESTHESIA ATTENDED CARE: Performed by: ANESTHESIOLOGY

## 2024-03-28 RX ORDER — SODIUM CHLORIDE 9 MG/ML
INJECTION, SOLUTION INTRAVENOUS CONTINUOUS
Status: ACTIVE | OUTPATIENT
Start: 2024-03-28 | End: 2024-03-28

## 2024-03-28 RX ORDER — SODIUM CHLORIDE 9 MG/ML
INJECTION, SOLUTION INTRAVENOUS CONTINUOUS PRN
Status: DISCONTINUED | OUTPATIENT
Start: 2024-03-28 | End: 2024-03-28 | Stop reason: SDUPTHER

## 2024-03-28 RX ORDER — KETAMINE HCL IN NACL, ISO-OSM 20 MG/2 ML
SYRINGE (ML) INJECTION PRN
Status: DISCONTINUED | OUTPATIENT
Start: 2024-03-28 | End: 2024-03-28 | Stop reason: SDUPTHER

## 2024-03-28 RX ORDER — LIDOCAINE HYDROCHLORIDE 20 MG/ML
INJECTION, SOLUTION INTRAVENOUS PRN
Status: DISCONTINUED | OUTPATIENT
Start: 2024-03-28 | End: 2024-03-28 | Stop reason: SDUPTHER

## 2024-03-28 RX ORDER — PROPOFOL 10 MG/ML
INJECTION, EMULSION INTRAVENOUS PRN
Status: DISCONTINUED | OUTPATIENT
Start: 2024-03-28 | End: 2024-03-28 | Stop reason: SDUPTHER

## 2024-03-28 RX ORDER — SODIUM CHLORIDE 9 MG/ML
INJECTION, SOLUTION INTRAVENOUS CONTINUOUS
Status: DISCONTINUED | OUTPATIENT
Start: 2024-03-28 | End: 2024-03-28

## 2024-03-28 RX ADMIN — EMPAGLIFLOZIN 10 MG: 10 TABLET, FILM COATED ORAL at 10:59

## 2024-03-28 RX ADMIN — AMIODARONE HYDROCHLORIDE 400 MG: 200 TABLET ORAL at 20:42

## 2024-03-28 RX ADMIN — Medication 20 MG: at 08:29

## 2024-03-28 RX ADMIN — SODIUM CHLORIDE, PRESERVATIVE FREE 10 ML: 5 INJECTION INTRAVENOUS at 11:00

## 2024-03-28 RX ADMIN — Medication 3 MG: at 20:42

## 2024-03-28 RX ADMIN — SPIRONOLACTONE 25 MG: 25 TABLET ORAL at 11:00

## 2024-03-28 RX ADMIN — AMIODARONE HYDROCHLORIDE 400 MG: 200 TABLET ORAL at 10:59

## 2024-03-28 RX ADMIN — HEPARIN SODIUM 4000 UNITS: 1000 INJECTION INTRAVENOUS; SUBCUTANEOUS at 00:27

## 2024-03-28 RX ADMIN — METOPROLOL SUCCINATE 100 MG: 100 TABLET, EXTENDED RELEASE ORAL at 11:00

## 2024-03-28 RX ADMIN — PHENYLEPHRINE HYDROCHLORIDE 100 MCG: 10 INJECTION, SOLUTION INTRAMUSCULAR; INTRAVENOUS; SUBCUTANEOUS at 08:38

## 2024-03-28 RX ADMIN — LIDOCAINE HYDROCHLORIDE 60 MG: 20 INJECTION, SOLUTION INTRAVENOUS at 08:28

## 2024-03-28 RX ADMIN — SODIUM CHLORIDE: 900 INJECTION, SOLUTION INTRAVENOUS at 08:27

## 2024-03-28 RX ADMIN — HEPARIN SODIUM 15 UNITS/KG/HR: 10000 INJECTION, SOLUTION INTRAVENOUS at 11:14

## 2024-03-28 RX ADMIN — SODIUM CHLORIDE: 9 INJECTION, SOLUTION INTRAVENOUS at 16:04

## 2024-03-28 RX ADMIN — AMIODARONE HYDROCHLORIDE 400 MG: 200 TABLET ORAL at 15:59

## 2024-03-28 RX ADMIN — PROPOFOL 50 MG: 10 INJECTION, EMULSION INTRAVENOUS at 08:29

## 2024-03-28 RX ADMIN — HEPARIN SODIUM 4000 UNITS: 1000 INJECTION INTRAVENOUS; SUBCUTANEOUS at 11:14

## 2024-03-28 RX ADMIN — PHENYLEPHRINE HYDROCHLORIDE 100 MCG: 10 INJECTION, SOLUTION INTRAMUSCULAR; INTRAVENOUS; SUBCUTANEOUS at 08:32

## 2024-03-28 RX ADMIN — PROPOFOL 50 MG: 10 INJECTION, EMULSION INTRAVENOUS at 08:31

## 2024-03-28 RX ADMIN — PHENYLEPHRINE HYDROCHLORIDE 100 MCG: 10 INJECTION, SOLUTION INTRAMUSCULAR; INTRAVENOUS; SUBCUTANEOUS at 08:35

## 2024-03-28 RX ADMIN — PROPOFOL 50 MG: 10 INJECTION, EMULSION INTRAVENOUS at 08:34

## 2024-03-28 ASSESSMENT — PAIN DESCRIPTION - FREQUENCY: FREQUENCY: INTERMITTENT

## 2024-03-28 NOTE — PROGRESS NOTES
V2.0    [facility] [place] [place] Progress Note      Name:  Gabriel Maya /Age/Sex: 1980  (43 y.o. male)   MRN & CSN:  [id number] & [id number] Encounter Date/Time: 3/27/2024   Location:  [address] [place], [place] [address]- PCP: Joe Perla DO     Attending:Delta Vallejo MD       Hospital Day: 6    Assessment and Recommendations       Assessment/Plan:     New onset A-fib with RVR:  Failed cardioversion 3/26/2024  Reattempted cardioversion following administration of amiodarone in the next day      Newly diagnosed systolic reduced EF ( 20 to 25%):  Echo shows global hypokinesis/cardiomyopathy  Continue IV Lasix    Suspected sleep apnea:  Will need sleep study on outpatient basis      Disposition: Eventual discharge home should not have needs          Diet Diet NPO Exceptions are: Sips of Water with Meds  ADULT DIET; Regular; Low Sodium (2 gm)   DVT Prophylaxis [] Lovenox, []  Heparin, [] SCDs, [] Ambulation,  [] Eliquis, [x] Xarelto  [] Coumadin   Code Status Full Code       Surrogate Decision Maker/ POA      Personally reviewed Lab Studies and Imaging           Subjective:     Chief Complaint: Shortness of breath    Gabriel Maya  is a 42-year-old male with no significant past medical history who is transferred from  ED to [facility] for further care management  Patient presented to the emergency room with upper respiratory symptoms which has been going on for 1 week shortness of breath worsening on exertion for the last 2 days vomiting and chest heaviness which started today.  Patient denies any loss of consciousness no diaphoresis no previous history of cardiac disease.  Workup in the emergency room notable for:  Patient was noticed to be in A-fib with RVR with a rate of 180s on arrival elevated blood pressure.  Imaging studies did not reveal any evidence of pneumonia  Patient does not seem to have any acute congestive heart failure but elevated BNP as well as elevated D-dimer.  Pulmonary emboli was ruled out

## 2024-03-28 NOTE — PROGRESS NOTES
V2.0    [facility] Progress Note      Name:  Gabriel Maya /Age/Sex: 1980  (43 y.o. male)   MRN & CSN:  [id number] & [id number] Encounter Date/Time: 3/27/2024   Location:  [facility]- PCP: Joe Perla DO     Attending:Delta Vallejo MD       Hospital Day: 6    Assessment and Recommendations       Assessment/Plan:     New onset A-fib with RVR:  Failed cardioversion 3/26/2024  Reattempted cardioversion today with successful   Continue amiodarone      Newly diagnosed systolic reduced EF ( 20 to 25%):  Echo shows global hypokinesis/cardiomyopathy  Continue IV Lasix  Left heart cath planned for tomorrow for ischemia workup    Suspected sleep apnea:  Will need sleep study on outpatient basis      Disposition: Eventual discharge home should not have needs          Diet Diet NPO Exceptions are: Sips of Water with Meds  ADULT DIET; Regular; Low Sodium (2 gm)   DVT Prophylaxis [] Lovenox, []  Heparin, [] SCDs, [] Ambulation,  [] Eliquis, [x] Xarelto  [] Coumadin   Code Status Full Code       Surrogate Decision Maker/ POA      Personally reviewed Lab Studies and Imaging           Subjective:     Chief Complaint: Shortness of breath    Gabriel Maya  is a 42-year-old male with no significant past medical history who is transferred from  ED to [facility] for further care management  Patient presented to the emergency room with upper respiratory symptoms which has been going on for 1 week shortness of breath worsening on exertion for the last 2 days vomiting and chest heaviness which started today.  Patient denies any loss of consciousness no diaphoresis no previous history of cardiac disease.  Workup in the emergency room notable for:  Patient was noticed to be in A-fib with RVR with a rate of 180s on arrival elevated blood pressure.  Imaging studies did not reveal any evidence of pneumonia  Patient does not seem to have any acute congestive heart failure but elevated BNP as well as

## 2024-03-28 NOTE — PLAN OF CARE
Problem: Discharge Planning  Goal: Discharge to home or other facility with appropriate resources  3/28/2024 0336 by Riley Oliver RN  Outcome: Progressing  3/27/2024 1835 by Guerline Lafleur RN  Outcome: Progressing  Flowsheets (Taken 3/27/2024 1835)  Discharge to home or other facility with appropriate resources:   Identify barriers to discharge with patient and caregiver   Arrange for needed discharge resources and transportation as appropriate   Identify discharge learning needs (meds, wound care, etc)  Note: Patient going for cardioversion @ 0800 tomorrow morning. NPO @ midnight. Patient received 800 mg PO amio and is still on amio gtt. Dilt discontinued. Heparin gtt started next aptt draw @ 0035     Problem: Pain  Goal: Verbalizes/displays adequate comfort level or baseline comfort level  3/28/2024 0336 by Riley Oliver RN  Outcome: Progressing  3/27/2024 1835 by Guerline Lafleur RN  Outcome: Progressing  Flowsheets (Taken 3/27/2024 1835)  Verbalizes/displays adequate comfort level or baseline comfort level:   Encourage patient to monitor pain and request assistance   Assess pain using appropriate pain scale  Note: No complaints of pain during this shift     Problem: Safety - Adult  Goal: Free from fall injury  3/28/2024 0336 by Riley Oliver RN  Outcome: Progressing  3/27/2024 1835 by Guerline Lafleur RN  Outcome: Progressing  Flowsheets (Taken 3/27/2024 1835)  Free From Fall Injury: Instruct family/caregiver on patient safety  Note: UAL     Problem: Respiratory - Adult  Goal: Achieves optimal ventilation and oxygenation  Outcome: Progressing     Problem: Cardiovascular - Adult  Goal: Maintains optimal cardiac output and hemodynamic stability  Outcome: Progressing  Goal: Absence of cardiac dysrhythmias or at baseline  Outcome: Progressing     Problem: Metabolic/Fluid and Electrolytes - Adult  Goal: Electrolytes maintained within normal limits  Outcome: Progressing  Goal: Hemodynamic stability and  optimal renal function maintained  Outcome: Progressing     Problem: ABCDS Injury Assessment  Goal: Absence of physical injury  Outcome: Progressing

## 2024-03-28 NOTE — PROGRESS NOTES
Creatinine is back at 1.6, slowly increasing, most likely due to overdiuresis.    - Will give normal saline at 100 mill per hour for total of 7 hours (total of 700 mL) and stop IV Lasix.  - I will hold Jardiance.  - N.p.o. after midnight except medications for possible LHC on Friday afternoon by Dr Bagley if Cr improves.

## 2024-03-28 NOTE — PROCEDURES
[facility]     Electrophysiology Procedure Note       Date of Procedure: 3/28/2024  Patient's Name: Gabriel Maya  YOB: 1980   Medical Record Number: [id number]  Procedure Performed by: Gian Arrington MD    Procedures performed:  External Electrical cardioversion   Sedation provided by anesthesia     Indication of the procedure:   Atrial fibrillation with RVR    Details of procedure:   The patient was brought to the cath lab area in a fasting and non-sedated state. The risks, benefits and alternatives of the procedure were discussed with the patient. The patient opted to proceed with the procedure. Written informed consent was signed and placed in the chart.  A timeout protocol was completed to identify the patient and the procedure being performed.     Sedation   Sedation was provided by anesthesia     Cardioversion  Once adequate sedation was confirmed with anesthesia she underwent electrical DC cardioversion using 200J, synchronized shock.     The patient tolerated the procedure well and there were no complications.     Assessment:   Successful external DC cardioversion of atrial fibrillation    Plan:  Continue Heparin drip, resume xarelto tomorrow following LHC  Continue amiodarone at current dose 400mg TID, switch to 200mg Daily on discharge, discontinue IV amiodarone drip  Continue metoprolol XL 100mg daily   GDMT for heart failure  Ischemic work up tomorrow per interventional cardiology   Follow up in electrophysiolgy clinic with Dr Deshpande in 1-2  months     Gian Arrington MD  [facility]   Office: [phone]  Fax: [phone]

## 2024-03-28 NOTE — PROGRESS NOTES
Cardiology Consult Service  Daily Progress Note        Admit Date:  3/23/2024  Primary cardiologist:  Dr Arrington / Dr Torres     Reason for Consultation/Chief Complaint: AHF    Subjective:     Gabriel Maya is a 43 y.o. male with a past medical history of bipolar disorder and PTSD.  Patient does not have health insurance.     Patient presented to the emergency room on 3/23 with progressively worse shortness of breath, chest pressure and URI symptoms x 1 week.  He was admitted for AF RVR and acute and new HFrEF.  EP was consulted, patient was seen by Dr Arrington / Abram Gardner CNP patient was placed on amiodarone and diltiazem drip along with Xarelto.  Patient underwent LINH followed by DC cardioversion on 3/26 with transient resumption of normal sinus rhythm, back into AFRVR.  Amiodarone and diltiazem drips were continued and patient was also placed on amiodarone 400 mg p.o. every 8 hours along with Toprol 100 mg daily.  Tentative plan includes repeat DC cardioversion within 48 hours (or 3/28/2024).     Echo 3/25/2024: Normal LV size and wall thickness, LVEF 20-25%, indeterminate diastolic function due to A-fib, no LV apical clot, normal LA size, normal RV size with mild dysfunction, mild MR/TR.      ECG 3/23/24 consistent with AF RVR, 173 bpm, no ischemic changes.     Patient has been on Lasix 60 mg IV twice daily.  He has been diuresing well and reports improving symptoms.  Heart failure team was consulted today to assess patient for further management of his newly diagnosed heart failure.  Patient is currently hemodynamically stable, still in atrial fibrillation with controlled ventricular response per telemetry.  He does not require any supplemental oxygen.  Creatinine up at 1.4 from 1.1.  High-sensitivity troponin x 2 negative.    Patient reports no previous cardiac history.  He denies any prior ischemic or congestive symptoms besides the symptoms that led to his admission.  He denies any palpitations  2.0) and wean supplemental oxygen to off (or down to baseline supplemental oxygen requirements) for sats greater than 90%.  -Continue with Toprol 100 mg daily, spironolactone 25 mg daily  - Continue Jardiance 10 mg daily  - Will check free T3 and T4 to rule out thyroid abnormalities.  - If LVEF remains < 35% after 90 days of GDMT, will discuss referral to EP for possible device placement. In the meantime, patient may elect to receive a LifeVest (as outpatient).  - Patient will need an outpatient sleep study and likely BiPAP.          I have personally reviewed the reports and images of labs, radiological studies, cardiac studies including ECG's and telemetry, current and old medical records. The note was completed using EMR and Dragon dictation system. Every effort was made to ensure accuracy; however, inadvertent computerized transcription errors may be present.    All questions and concerns were addressed to the patient/family. Alternatives to my treatment were discussed.     Thank you for allowing to us to participate in the care or Gabriel Maya. Please call our service with questions.    Aldair Torres MD, FAC, [facility]  [facility] [place] [address] [address]  Ph: [phone]  Fax: [phone]

## 2024-03-28 NOTE — ANESTHESIA PRE PROCEDURE
tablet 40 mEq  40 mEq Oral PRN Kimi Contreras MD        Or    potassium bicarb-citric acid (EFFER-K) effervescent tablet 40 mEq  40 mEq Oral PRN Kimi Contreras MD        Or    potassium chloride 10 mEq/100 mL IVPB (Peripheral Line)  10 mEq IntraVENous PRN Kimi Contreras MD        magnesium sulfate 2000 mg in 50 mL IVPB premix  2,000 mg IntraVENous PRN Kimi Contreras MD        ondansetron (ZOFRAN-ODT) disintegrating tablet 4 mg  4 mg Oral Q8H PRN Kimi Contreras MD        Or    ondansetron (ZOFRAN) injection 4 mg  4 mg IntraVENous Q6H PRN Kimi Contreras MD        senna (SENOKOT) tablet 8.6 mg  1 tablet Oral Daily PRN Kimi Contreras MD        aluminum & magnesium hydroxide-simethicone (MAALOX) 200-200-20 MG/5ML suspension 30 mL  30 mL Oral Q6H PRN Kimi Contreras MD        acetaminophen (TYLENOL) tablet 650 mg  650 mg Oral Q6H PRN Kimi Contreras MD        Or    acetaminophen (TYLENOL) suppository 650 mg  650 mg Rectal Q6H PRN Kimi Contreras MD        melatonin tablet 3 mg  3 mg Oral Nightly Kimi Contreras MD   3 mg at 03/27/24 2058    benzonatate (TESSALON) capsule 100 mg  100 mg Oral TID PRN Nayla Rosado APRN - CNP   100 mg at 03/24/24 0501    metoprolol succinate (TOPROL XL) extended release tablet 100 mg  100 mg Oral Daily Christophe Shaw MD   100 mg at 03/27/24 0957    furosemide (LASIX) injection 60 mg  60 mg IntraVENous BID Christophe Shaw MD   60 mg at 03/27/24 1738    spironolactone (ALDACTONE) tablet 25 mg  25 mg Oral Daily Christophe Shaw MD   25 mg at 03/27/24 0957       Allergies:  No Known Allergies    Problem List:    Patient Active Problem List   Diagnosis Code    History of hypertension Z86.79    History of bipolar disorder & PTSD Z86.59    Class 3 severe obesity due to excess calories without serious comorbidity with body mass index (BMI) of 40.0 to 44.9 in adult (HCC) E66.01, Z68.41    Mixed hyperlipidemia E78.2    Right knee pain M25.561

## 2024-03-28 NOTE — PROGRESS NOTES
Cath Lab Pre Procedure Flowsheet    Plan of Care:     Hemodynamics and cardiac rhythm will remain stable.   Comfort level will be maintained.   Respiratory function will remain adequate.   Pt/family will verbalize understanding of the procedure.   Procedure will be tolerated without complications.   Patient will recover from procedure without complications.   ID armband on patient and identification verified.   Informed consent obtained.   Non invasive blood pressure cuff applied, monitoring initiated.   EKG pads and pulse oximeter applied, monitoring initiated.   Instructions given. Patient and / or family verbalize understanding.   H&P will be documented by physician in [facility].     Pre-procedure:    NPO Status: Pt has been NPO since midnight. .    Contrast / IV Dye Allergy:     Pregnancy Test: N/A.    Prep Sites: Wrist(s)  Chest    Jovany's Test:    Pulses:     Anticoagulants: xarelto 3/26.  Heparin cont infusion    Antiplatelets:     Chief Complaint:  Dyspnea    Diabetic: No    Pre EKG Rhythm: afib    Pre SBP:130/96    IV access: right a/c    Pre-procedure blood work collected by: katerina    NIH Scale:

## 2024-03-28 NOTE — ANESTHESIA POSTPROCEDURE EVALUATION
Department of Anesthesiology  Postprocedure Note    Patient: Gabriel Maya  MRN: [id number]  YOB: 1980  Date of evaluation: 3/28/2024    Procedure Summary       Date: 03/28/24 Room / Location: [facility] Cath Lab    Anesthesia Start: 0827 Anesthesia Stop: 0850    Procedure: [facility] CARDIOVERSION W ANES Diagnosis:     Scheduled Providers: Ta Heath DO Responsible Provider: Ta Heath DO    Anesthesia Type: MAC ASA Status: 3            Anesthesia Type: MAC    Heber Phase I:      Heber Phase II:      Anesthesia Post Evaluation    Patient location during evaluation: PACU  Patient participation: complete - patient participated  Level of consciousness: awake  Pain score: 0  Airway patency: patent  Nausea & Vomiting: no nausea and no vomiting  Cardiovascular status: hemodynamically stable  Respiratory status: acceptable  Hydration status: stable  Multimodal analgesia pain management approach  Pain management: adequate    No notable events documented.

## 2024-03-28 NOTE — PROGRESS NOTES
CATH LAB PROCEDURE LOG - CARDIOVERSION      PRE Procedure:    Pt arrived to Cath Lab.   Plan of Care:   Hemodynamics and cardiac rhythm will remain stable.   Comfort level will be maintained.   Respiratory function will remain adequate.   Pt/family will verbalize understanding of the procedure.   Procedure will be tolerated without complications.   Patient will recover from procedure without complications.   ID armband on patient and identification verified.   Informed consent obtained.   Non invasive blood pressure cuff applied, monitoring initiated.   EKG pads and pulse oximeter applied, monitoring initiated.   Instructions given. Patient and / or family verbalize understanding.   H&P will be documented by physician in Epic.     Pt has been NPO since midnight.     Post DCCV EKG ordered? Yes      Timeout and Fire Safety completed at 8:30 AM.     Correct patient verified.   Members of the surgical team/visitors introduced.   Allergies announced.   Correct procedure verified.   Correct procedure site verified.   Consents verified.   Implant equipment, additional services, special requirements available.   Medications labeled and available.   Appropriate pre medications have been administered.     Alcohol prep solution had sufficient time to dissipate if used. Yes=1, No=0  Surgical site or incision above the xyphoid. Yes=1, No=0  Open oxygen source. Yes=1, No=0  Available ignition source. Yes=1, No=0  Score total - 1.     Procedure Medication:     Anesthesia Sedatin:31 AM Anesthesia sedates patient, refer to anesthesia encounter.                Cardioversion:  Synchronized Cardioversion performed at 200 joules  8:34 AM Rhythm was converted to nsr        Defibrillator pads removed, skin intact.     Post EKG completed.     8:55 AM Pt waking up, respirations spontaneous, able to converse appropriately, follows commands, resting quietly.     POST Procedure:    DISCHARGE TIME: 9:31 AM    Report given to floor nurse.

## 2024-03-28 NOTE — PLAN OF CARE
Problem: Discharge Planning  Goal: Discharge to home or other facility with appropriate resources  Outcome: Progressing  Flowsheets (Taken 3/28/2024 1900)  Discharge to home or other facility with appropriate resources:   Identify barriers to discharge with patient and caregiver   Arrange for needed discharge resources and transportation as appropriate  Note: LIFE VEST TODAY. CATH TOMORROW. NPO @ MIDNIGHT     Problem: Pain  Goal: Verbalizes/displays adequate comfort level or baseline comfort level  Outcome: Progressing  Flowsheets (Taken 3/28/2024 1900)  Verbalizes/displays adequate comfort level or baseline comfort level:   Encourage patient to monitor pain and request assistance   Assess pain using appropriate pain scale     Problem: Safety - Adult  Goal: Free from fall injury  Outcome: Progressing  Flowsheets (Taken 3/28/2024 1900)  Free From Fall Injury: Instruct family/caregiver on patient safety     Problem: Respiratory - Adult  Goal: Achieves optimal ventilation and oxygenation  Outcome: Progressing  Achieves optimal ventilation and oxygenation:   Assess for changes in respiratory status   Position to facilitate oxygenation and minimize respiratory effort   Initiate smoking cessation protocol as indicated   Assess the need for suctioning and aspirate as needed   Respiratory therapy support as indicated   Assess for changes in mentation and behavior   Oxygen supplementation based on oxygen saturation or arterial blood gases   Encourage broncho-pulmonary hygiene including cough, deep breathe, incentive spirometry   Assess and instruct to report shortness of breath or any respiratory difficulty

## 2024-03-28 NOTE — PLAN OF CARE
Reviewed with Dr. Arrington.  We will continue his amiodarone at 400 mg 3 times daily until discharge at which point his prescription will be changed to 200 mg daily.

## 2024-03-28 NOTE — H&P
[facility]          Electrophysiology H&P Note       Date of Procedure: 3/28/2024  Patient's Name: Gabriel Maya  YOB: 1980   Medical Record Number: [id number]    Indication:  Atrial fibrillation   Cardioversion     Consent:   I have discussed with the patient and/or the patient representative the indication, alternatives, and the possible risks and/or complications of the planned procedure and the anesthesia methods. The patient and/or patient representative appear to understand and agree to proceed.    Past Medical History:   Diagnosis Date    History of bipolar disorder & PTSD 7/27/2021    Mixed hyperlipidemia 7/29/2021       Past Surgical History:   Procedure Laterality Date    INGUINAL HERNIA REPAIR Bilateral     WISDOM TOOTH EXTRACTION         Prior to Admission medications    Medication Sig Start Date End Date Taking? Authorizing Provider   ibuprofen (ADVIL;MOTRIN) 600 MG tablet Take 1 tablet by mouth every 6 hours as needed for Pain 7/7/19 12/18/20  Jeanie Bauer MD         Pre-sedation Documentation and Exam:  I have personally completed a history, physical exam & review of systems for this patient (see notes).    BP (!) 120/91   Pulse 79   Temp 99.4 °F (37.4 °C) (Oral)   Resp 18   Ht 1.753 m (5' 9\")   Wt 129.7 kg (285 lb 15 oz)   SpO2 100%   BMI 42.23 kg/m²   NAD  Chest clear non labored  Heart tachycardic, irregular rhythm   Abd soft non tender  No focal neuro deficits  Appropriate mood and affect     Mallampati Airway Assessment:  III     ASA Classification:  Class III - A patient with severe systemic disease that limits activity but is not incapacitating     Sedation/Anesthesia Plan:  Moderate     Medications Planned:  Per anesthesia     Gian Arrington MD  [facility]   Office: [phone]  Fax: [phone]

## 2024-03-29 VITALS
RESPIRATION RATE: 18 BRPM | TEMPERATURE: 97.7 F | WEIGHT: 287.92 LBS | DIASTOLIC BLOOD PRESSURE: 83 MMHG | SYSTOLIC BLOOD PRESSURE: 140 MMHG | OXYGEN SATURATION: 91 % | HEART RATE: 70 BPM | HEIGHT: 69 IN | BODY MASS INDEX: 42.64 KG/M2

## 2024-03-29 PROBLEM — I50.21 ACUTE SYSTOLIC CHF (CONGESTIVE HEART FAILURE), NYHA CLASS 2 (HCC): Status: ACTIVE | Noted: 2024-03-29

## 2024-03-29 PROBLEM — Z79.899 ON AMIODARONE THERAPY: Status: ACTIVE | Noted: 2024-03-29

## 2024-03-29 PROBLEM — I42.9 CARDIOMYOPATHY (HCC): Status: ACTIVE | Noted: 2024-03-29

## 2024-03-29 PROBLEM — I48.0 PAROXYSMAL ATRIAL FIBRILLATION (HCC): Status: ACTIVE | Noted: 2024-03-29

## 2024-03-29 LAB
ANION GAP SERPL CALCULATED.3IONS-SCNC: 8 MMOL/L (ref 3–16)
APTT BLD: 69.2 SEC (ref 22.7–35.9)
BUN SERPL-MCNC: 20 MG/DL (ref 7–20)
CALCIUM SERPL-MCNC: 9.4 MG/DL (ref 8.3–10.6)
CHLORIDE SERPL-SCNC: 101 MMOL/L (ref 99–110)
CO2 SERPL-SCNC: 27 MMOL/L (ref 21–32)
CREAT SERPL-MCNC: 1.5 MG/DL (ref 0.9–1.3)
DEPRECATED RDW RBC AUTO: 14.6 % (ref 12.4–15.4)
GFR SERPLBLD CREATININE-BSD FMLA CKD-EPI: 58 ML/MIN/{1.73_M2}
GLUCOSE SERPL-MCNC: 119 MG/DL (ref 70–99)
HCT VFR BLD AUTO: 46.3 % (ref 40.5–52.5)
HGB BLD-MCNC: 15.5 G/DL (ref 13.5–17.5)
MCH RBC QN AUTO: 30.3 PG (ref 26–34)
MCHC RBC AUTO-ENTMCNC: 33.4 G/DL (ref 31–36)
MCV RBC AUTO: 90.7 FL (ref 80–100)
PLATELET # BLD AUTO: 223 K/UL (ref 135–450)
PMV BLD AUTO: 10.4 FL (ref 5–10.5)
POTASSIUM SERPL-SCNC: 4.5 MMOL/L (ref 3.5–5.1)
RBC # BLD AUTO: 5.11 M/UL (ref 4.2–5.9)
SODIUM SERPL-SCNC: 136 MMOL/L (ref 136–145)
T4 FREE SERPL-MCNC: 1 NG/DL (ref 0.9–1.8)
WBC # BLD AUTO: 12.4 K/UL (ref 4–11)

## 2024-03-29 PROCEDURE — C1894 INTRO/SHEATH, NON-LASER: HCPCS

## 2024-03-29 PROCEDURE — 99152 MOD SED SAME PHYS/QHP 5/>YRS: CPT

## 2024-03-29 PROCEDURE — 2580000003 HC RX 258: Performed by: INTERNAL MEDICINE

## 2024-03-29 PROCEDURE — 99233 SBSQ HOSP IP/OBS HIGH 50: CPT

## 2024-03-29 PROCEDURE — 93458 L HRT ARTERY/VENTRICLE ANGIO: CPT | Performed by: INTERNAL MEDICINE

## 2024-03-29 PROCEDURE — 99233 SBSQ HOSP IP/OBS HIGH 50: CPT | Performed by: INTERNAL MEDICINE

## 2024-03-29 PROCEDURE — 6360000002 HC RX W HCPCS: Performed by: INTERNAL MEDICINE

## 2024-03-29 PROCEDURE — 6360000002 HC RX W HCPCS

## 2024-03-29 PROCEDURE — 6370000000 HC RX 637 (ALT 250 FOR IP): Performed by: INTERNAL MEDICINE

## 2024-03-29 PROCEDURE — 80048 BASIC METABOLIC PNL TOTAL CA: CPT

## 2024-03-29 PROCEDURE — 99152 MOD SED SAME PHYS/QHP 5/>YRS: CPT | Performed by: INTERNAL MEDICINE

## 2024-03-29 PROCEDURE — 85730 THROMBOPLASTIN TIME PARTIAL: CPT

## 2024-03-29 PROCEDURE — 4A023N7 MEASUREMENT OF CARDIAC SAMPLING AND PRESSURE, LEFT HEART, PERCUTANEOUS APPROACH: ICD-10-PCS | Performed by: INTERNAL MEDICINE

## 2024-03-29 PROCEDURE — 2500000003 HC RX 250 WO HCPCS

## 2024-03-29 PROCEDURE — B2151ZZ FLUOROSCOPY OF LEFT HEART USING LOW OSMOLAR CONTRAST: ICD-10-PCS | Performed by: INTERNAL MEDICINE

## 2024-03-29 PROCEDURE — 2709999900 HC NON-CHARGEABLE SUPPLY

## 2024-03-29 PROCEDURE — 36415 COLL VENOUS BLD VENIPUNCTURE: CPT

## 2024-03-29 PROCEDURE — 93458 L HRT ARTERY/VENTRICLE ANGIO: CPT

## 2024-03-29 PROCEDURE — C1769 GUIDE WIRE: HCPCS

## 2024-03-29 PROCEDURE — B2111ZZ FLUOROSCOPY OF MULTIPLE CORONARY ARTERIES USING LOW OSMOLAR CONTRAST: ICD-10-PCS | Performed by: INTERNAL MEDICINE

## 2024-03-29 PROCEDURE — 85027 COMPLETE CBC AUTOMATED: CPT

## 2024-03-29 RX ORDER — FUROSEMIDE 40 MG/1
40 TABLET ORAL DAILY
Qty: 60 TABLET | Refills: 3 | Status: SHIPPED | OUTPATIENT
Start: 2024-03-29

## 2024-03-29 RX ORDER — FUROSEMIDE 40 MG/1
40 TABLET ORAL DAILY
Qty: 60 TABLET | Refills: 3 | Status: SHIPPED | OUTPATIENT
Start: 2024-03-29 | End: 2024-03-29

## 2024-03-29 RX ORDER — ACETAMINOPHEN 325 MG/1
650 TABLET ORAL EVERY 4 HOURS PRN
Status: DISCONTINUED | OUTPATIENT
Start: 2024-03-29 | End: 2024-03-29 | Stop reason: HOSPADM

## 2024-03-29 RX ORDER — SODIUM CHLORIDE 9 MG/ML
INJECTION, SOLUTION INTRAVENOUS PRN
Status: DISCONTINUED | OUTPATIENT
Start: 2024-03-29 | End: 2024-03-29 | Stop reason: HOSPADM

## 2024-03-29 RX ORDER — SODIUM CHLORIDE 9 MG/ML
INJECTION, SOLUTION INTRAVENOUS CONTINUOUS
Status: DISCONTINUED | OUTPATIENT
Start: 2024-03-29 | End: 2024-03-29

## 2024-03-29 RX ORDER — AMIODARONE HYDROCHLORIDE 200 MG/1
200 TABLET ORAL DAILY
Qty: 30 TABLET | Refills: 0 | Status: SHIPPED | OUTPATIENT
Start: 2024-03-29

## 2024-03-29 RX ORDER — SODIUM CHLORIDE 9 MG/ML
INJECTION, SOLUTION INTRAVENOUS CONTINUOUS
Status: ACTIVE | OUTPATIENT
Start: 2024-03-29 | End: 2024-03-29

## 2024-03-29 RX ORDER — SPIRONOLACTONE 25 MG/1
25 TABLET ORAL DAILY
Qty: 30 TABLET | Refills: 3 | Status: SHIPPED | OUTPATIENT
Start: 2024-03-30

## 2024-03-29 RX ORDER — SODIUM CHLORIDE 0.9 % (FLUSH) 0.9 %
5-40 SYRINGE (ML) INJECTION PRN
Status: DISCONTINUED | OUTPATIENT
Start: 2024-03-29 | End: 2024-03-29 | Stop reason: HOSPADM

## 2024-03-29 RX ORDER — SODIUM CHLORIDE 0.9 % (FLUSH) 0.9 %
5-40 SYRINGE (ML) INJECTION EVERY 12 HOURS SCHEDULED
Status: DISCONTINUED | OUTPATIENT
Start: 2024-03-29 | End: 2024-03-29 | Stop reason: HOSPADM

## 2024-03-29 RX ORDER — METOPROLOL SUCCINATE 100 MG/1
100 TABLET, EXTENDED RELEASE ORAL DAILY
Qty: 30 TABLET | Refills: 0 | Status: SHIPPED | OUTPATIENT
Start: 2024-03-30 | End: 2024-04-02

## 2024-03-29 RX ADMIN — HEPARIN SODIUM 2000 UNITS: 1000 INJECTION INTRAVENOUS; SUBCUTANEOUS at 00:06

## 2024-03-29 RX ADMIN — SODIUM CHLORIDE, PRESERVATIVE FREE 10 ML: 5 INJECTION INTRAVENOUS at 09:31

## 2024-03-29 RX ADMIN — AMIODARONE HYDROCHLORIDE 400 MG: 200 TABLET ORAL at 09:21

## 2024-03-29 RX ADMIN — SODIUM CHLORIDE: 9 INJECTION, SOLUTION INTRAVENOUS at 09:38

## 2024-03-29 RX ADMIN — HEPARIN SODIUM 2000 UNITS: 1000 INJECTION INTRAVENOUS; SUBCUTANEOUS at 07:19

## 2024-03-29 RX ADMIN — AMIODARONE HYDROCHLORIDE 400 MG: 200 TABLET ORAL at 14:50

## 2024-03-29 RX ADMIN — METOPROLOL SUCCINATE 100 MG: 100 TABLET, EXTENDED RELEASE ORAL at 09:21

## 2024-03-29 RX ADMIN — HEPARIN SODIUM 15 UNITS/KG/HR: 10000 INJECTION, SOLUTION INTRAVENOUS at 00:03

## 2024-03-29 RX ADMIN — SPIRONOLACTONE 25 MG: 25 TABLET ORAL at 09:21

## 2024-03-29 ASSESSMENT — PAIN - FUNCTIONAL ASSESSMENT: PAIN_FUNCTIONAL_ASSESSMENT: NONE - DENIES PAIN

## 2024-03-29 NOTE — PROGRESS NOTES
Notified MD and cardiology, patient's heart rhythm is back in atrial fibrillation, HR 74. Patient denies chest pain, dizziness, etc..

## 2024-03-29 NOTE — PROCEDURES
[facility]                                              INTERVENTIONAL CARDIOLOGY                                           [facility]                                                LEFT HEART CATH    Gabriel Maya   43 y.o., male  1980      3/29/2024    Procedure performed by Dr. Adrien Bagley MD, [facility]  Surgical assistants :none    Procedure  Selective Coronary Angiography  Cardiac Catheterization for Coronary Anatomy  Left Heart Catheterization  Left Ventriculogram  Radial artery access assisted by ultrasound  Arterial Access Right Radial Artery after a negative Jovany test  TR Band    Any and all anesthesia was administered by my staff under my direct supervision and monitored by a trained independent observer.    Indication:Cardiac cath to rule out ischemic CAD, Possible angioplasty, The procedure and risks described to patient including risk of CVA, MI, bleeding, emergency surgery, death, patient with cardiomyopathy that her low ejection fraction.  Atrial fibrillation.  Did undergo cardioversion on yesterday that did not hold.  We are asked to see him to rule out CAD., Consent signed, or positive stress test  Unspecified Angina  Anesthesia: Moderate sedation with Versed and Fentanyl IV  Anesthesia Start time 1327  Anesthesia end time 1348  Estimated blood loss :minimal    Specimen removed: NONE    Abnormal Stress Test      Procedure Description:  After written informed consent was obtained, the patient was   brought to the cardiac catheterization suite, where patient was prepped and   draped in the usual sterile fashion. Local anesthesia was achieved in the   right wrist with 2% lidocaine. A 5-Qatari hemostasis sheath was placed into   the radial artery.    The pre cocktail of heparin, verapamil and nitroglycerin was injected into the sheath    The JR4 catheter was

## 2024-03-29 NOTE — PROGRESS NOTES
Lowell salazar tomorrow    Did meds to beds TODAY as [facility] closed on weekend    Electronically signed by Delta Vallejo MD on 3/29/2024 at 2:17 PM

## 2024-03-29 NOTE — PLAN OF CARE
Problem: Pain  Goal: Verbalizes/displays adequate comfort level or baseline comfort level  3/29/2024 0247 by Osmar Sol, RN  Outcome: Progressing  Flowsheets (Taken 3/29/2024 0247)  Verbalizes/displays adequate comfort level or baseline comfort level:   Encourage patient to monitor pain and request assistance   Assess pain using appropriate pain scale  Note: No complaints of pain throughout shift     Problem: Respiratory - Adult  Goal: Achieves optimal ventilation and oxygenation  3/29/2024 0247 by Osmar Sol, RN  Outcome: Progressing  Flowsheets (Taken 3/29/2024 0247)  Achieves optimal ventilation and oxygenation:   Assess for changes in respiratory status   Assess for changes in mentation and behavior   Position to facilitate oxygenation and minimize respiratory effort  Note: Patient remains on room air. No SOB.      Problem: Cardiovascular - Adult  Goal: Maintains optimal cardiac output and hemodynamic stability  Outcome: Progressing  Flowsheets (Taken 3/29/2024 0247)  Maintains optimal cardiac output and hemodynamic stability:   Monitor blood pressure and heart rate   Monitor urine output and notify Licensed Independent Practitioner for values outside of normal range   Assess for signs of decreased cardiac output  Note: VSS throughout shift. Patient with life vest on due to low EF. Left heart cath planned for today with cardiology.

## 2024-03-29 NOTE — PLAN OF CARE
Problem: Discharge Planning  Goal: Discharge to home or other facility with appropriate resources  3/29/2024 1955 by Soraida Hampton RN  Outcome: Progressing   Patient discharging tonight    Problem: Pain  Goal: Verbalizes/displays adequate comfort level or baseline comfort level  3/29/2024 1956 by Soraida Hampton RN  Outcome: Adequate for Discharge   Denies pain at this time  Problem: Safety - Adult  Goal: Free from fall injury  3/29/2024 1956 by Soraida Hampton RN  Outcome: Adequate for Discharge   Bed in lowest position, call light within reach  Problem: Respiratory - Adult  Goal: Achieves optimal ventilation and oxygenation  3/29/2024 1956 by Soraida Hampton RN  Outcome: Adequate for Discharge     Problem: Cardiovascular - Adult  Goal: Maintains optimal cardiac output and hemodynamic stability  3/29/2024 1956 by Soraida Hampton RN  Outcome: Adequate for Discharge     Problem: Cardiovascular - Adult  Goal: Absence of cardiac dysrhythmias or at baseline  3/29/2024 1956 by Soraida Hampton RN  Outcome: Adequate for Discharge     Problem: Metabolic/Fluid and Electrolytes - Adult  Goal: Electrolytes maintained within normal limits  3/29/2024 1956 by Soraida Hampton RN  Outcome: Adequate for Discharge     Problem: Metabolic/Fluid and Electrolytes - Adult  Goal: Hemodynamic stability and optimal renal function maintained  3/29/2024 1956 by Soraida Hampton RN  Outcome: Adequate for Discharge     Problem: ABCDS Injury Assessment  Goal: Absence of physical injury  3/29/2024 1956 by Soraida Hampton RN  Outcome: Adequate for Discharge     Problem: Chronic Conditions and Co-morbidities  Goal: Patient's chronic conditions and co-morbidity symptoms are monitored and maintained or improved  3/29/2024 1956 by Soraida Hampton RN  Outcome: Adequate for Discharge

## 2024-03-29 NOTE — PROGRESS NOTES
[facility]   Cardiology  Note   Dr MEÑO Bagley MD, FACC   Karen Chin RN, FNP APRN CVNP  Date: 3/29/2024  Admit Date: 3/23/2024       CC:SOB   Pt of Dr Torres / Dr Arrington     Interventional cardiology following pt for ischemic work up  /  LHC  today   EP following for afib    Dr Torres pt for AHF     Echo 3/25/2024: Normal LV size and wall thickness, LVEF 20-25%, indeterminate diastolic function due to A-fib, no LV apical clot, normal LA size, normal RV size with mild dysfunction, mild MR/TR.      ECG 3/23/24 consistent with AF RVR, 173 bpm, no ischemic changes.    Medical Decision Making:  Discussion of patient care with other providers  Patient seen and examined. Clinical notes reviewed. Telemetry reviewed / Pertinent labs, diagnostic, device, and imaging results reviewed as a part of this visit  I spent a total of 50 minutes and greater than 50% of the time was spent counseling with patient  coordinating care regarding her diagnosis, treatments and plan of care    Scheduled Meds:   [Held by provider] empagliflozin  10 mg Oral Daily    amiodarone  400 mg Oral TID    sodium chloride flush  5-40 mL IntraVENous 2 times per day    melatonin  3 mg Oral Nightly    metoprolol succinate  100 mg Oral Daily    spironolactone  25 mg Oral Daily     Vitals:    03/29/24 0914   BP: 111/73   Pulse: 63   Resp: 16   Temp: 97.6 °F (36.4 °C)   SpO2: 97%      In: 1847 [P.O.:1647; I.V.:200]  Out: 2260    Wt Readings from Last 3 Encounters:   03/29/24 130.6 kg (287 lb 14.7 oz)   10/12/23 134.3 kg (296 lb 3 oz)   05/13/23 131.5 kg (290 lb)       Intake/Output Summary (Last 24 hours) at 3/29/2024 1308  Last data filed at 3/29/2024 1023  Gross per 24 hour   Intake 1350 ml   Output 2260 ml   Net -910 ml       Telemetry: Personally Reviewed    Constitutional: Cooperative and in no apparent distress, and appears well nourished  Skin: Warm and pink; no pallor, cyanosis, clubbing, or bruising   HEENT: Symmetric and normocephalic.    Cardiovascular: Regular rate and rhythm. S1/S2   Respiratory: Respirations symmetric and unlabored. Lungs clear to auscultation bilaterally, no wheezing, crackles, or rhonchi  Gastrointestinal: Abdomen soft and round. Bowel sounds normoactive without tenderness or masses.  Musculoskeletal: Bilateral upper and lower extremity strength 5/5 with full ROM  Neurologic/Psych: Awake and orientated to person, place and time. Calm affect, appropriate mood      Patient Active Problem List    Diagnosis Date Noted    Right knee pain 05/12/2022    Left knee pain 05/12/2022    On amiodarone therapy 03/29/2024    Paroxysmal atrial fibrillation (HCC) 03/29/2024    Cardiomyopathy ([facility]) 03/29/2024    Acute systolic CHF (congestive heart failure), NYHA class 2 ([facility]) 03/29/2024    Atrial fibrillation with rapid ventricular response ([facility]) 03/23/2024    Mixed hyperlipidemia 07/29/2021    History of hypertension 07/27/2021    History of bipolar disorder & PTSD 07/27/2021    Class 3 severe obesity due to excess calories without serious comorbidity with body mass index (BMI) of 40.0 to 44.9 in adult ([facility]) 07/27/2021        Assessment       PAF   [id number] Score for Atrial Fibrillation Stroke Risk 2  Per EP:   S/p LINH/DCCV to NSR w/ ERAF (3/26/2024, Dr. Arrington)   S/p DCCV to NSR (3/28/2024, Dr. Arrington)   On Heparin gtt-plan is to switch to Xarelto 20 mg daily after cardiac cath this afternoon  On amiodarone 400 mg 3 times daily-plan is to switch to 200 mg daily at discharge  On Toprol  mg daily- continue    Acute sHF   EF 20-25%  NYHA class II   Zoll life vest   ProBNP 4,428 . On 3/23/2024      bipolar disorder and PTSD   Stable     Plan   sCr 1.6> 1.5  / net - 5.7 liters out   NPO / LHC scheduled for today with Dr. Bagley   C indicated, risks benefits & alternatives has been discussed    On Heparin gtt-plan is to switch to Xarelto 20 mg daily after cardiac cath this afternoon     Thank you for allowing to us to participate

## 2024-03-29 NOTE — PROGRESS NOTES
Electrophysiology - PROGRESS NOTE    Admit Date: 3/23/2024     Chief Complaint: AF/AFL, CMP     Interval History:   Patient seen and examined and notes reviewed. Patient is being followed for new onset AF/AFL and cardiomyopathy.  Patient had presented to the ED on 3/23/2024 with complaints of chest pressure, shortness of breath, sinus congestion and cough that had been ongoing for about a week.  He stated that he had been dealing with an upper respiratory infection and then developed dyspnea on exertion along with nausea and vomiting.  He was noted to be in atrial fibrillation with RVR with a heart rate of 173 bpm.  He was not feeling any palpitations or heart racing at that time.  He was placed on a diltiazem drip as well as an amiodarone drip.  He underwent LINH DCCV on 3/26/2024 with ERAF.  His amiodarone was changed to  mg 3 times daily and started on Toprol  mg daily.  Yesterday he underwent DCCV to NSR.  Plan is to continue heparin drip and transition to Xarelto after left heart cath today.  LifeVest was placed on patient yesterday.  Patient remains sinus rhythm with PACs overnight.  Patient denies any palpitations heart racing or chest pain.  Patient is scheduled for left cardiac cath this afternoon with Dr. Bagley.    In: 1847 [P.O.:1647; I.V.:200]  Out: 2260    Wt Readings from Last 2 Encounters:   03/29/24 130.6 kg (287 lb 14.7 oz)   10/12/23 134.3 kg (296 lb 3 oz)         Data:   Scheduled Meds:   Scheduled Meds:   [Held by provider] empagliflozin  10 mg Oral Daily    amiodarone  400 mg Oral TID    sodium chloride flush  5-40 mL IntraVENous 2 times per day    melatonin  3 mg Oral Nightly    metoprolol succinate  100 mg Oral Daily    spironolactone  25 mg Oral Daily     Continuous Infusions:   sodium chloride 75 mL/hr at 03/29/24 0938    [Held by provider] heparin (PORCINE) Infusion 19 Units/kg/hr (03/29/24 0720)    sodium chloride    outpatient    CMP/Acute sCHF  - Appears compensated  - EF 20-25%  - NYHA class II  -   - On Toprol  mg daily- continue  - On spironolactone 25 mg daily- continue  - Jardiance on hold due to bump in creatinine  - LifeVest placed yesterday  - Dr. Torres following and managing  - [facility] scheduled for today with Dr. Bagley  - Social Service following for outpatient resources       Perlita Langford [facility] [place]    I spent a total of 50 minutes and greater than 50% of the time was spent counseling with Gabriel Maya and coordinating care regarding her diagnosis, treatments and plan of care.

## 2024-03-29 NOTE — PROGRESS NOTES
Cath Lab Pre Procedure Flowsheet    Plan of Care:     Hemodynamics and cardiac rhythm will remain stable.   Comfort level will be maintained.   Respiratory function will remain adequate.   Pt/family will verbalize understanding of the procedure.   Procedure will be tolerated without complications.   Patient will recover from procedure without complications.   ID armband on patient and identification verified.   Informed consent obtained.   Non invasive blood pressure cuff applied, monitoring initiated.   EKG pads and pulse oximeter applied, monitoring initiated.   Instructions given. Patient and / or family verbalize understanding.   H&P will be documented by physician in [facility].     Pre-procedure:    NPO Status: Pt has been NPO since midnight. .    Pregnancy Test: N/A.    Prep Sites: Wrist(s)  Groin(s)    Pulses: Bilat Radial 2, Bilat Femoral Doppler, Bilat DP 1, Bilat PT 1    Jovany's Test: Adequate blood flow    Pre SBP:  128    Pre EKG Rhythm: AFib    Pre-procedure blood work collected by: See Epic    IV access: PIV #22 RAC, PIV #20 RFA    Prior Cath/CABG on Chart: No    Echo Date: N/A     Stress Test Date: N/A.       EKG Date: 3/23/2024    Anticoagulants:  Heparin drip held at 1108.     Antiplatelets: None.     Chief Complaint:  Arrythmia    Admit Source: Inpatient    Admission Date:  3/23/2024    Surgeries Planned: No    Bleeding Problems: No    Medication Compliance Issues: No    Hypertension: Yes    Dyslipidemia: Yes    Family History of CAD: Unknown, Pt states he is adopted.    Prior MI: No    Prior PCI: No    Prior CABG: No    Cerebral Vascular Disease: No    Peripheral Arterial Disease: No    Chronic Lung Disease: No    Tobacco: Never    Diabetic: No    Cardiac Arrest: No    Dialysis: No    Frailty Score: 3 MANAGING WELL (medical problems are well controlled, not regularly active beyond routine walking)    Recreational Drug Use: No

## 2024-03-29 NOTE — CARE COORDINATION
CM made aware of potential DC later today vs tomorrow, pending [facility]. CM noted consult order for SW d/t \"no insurance\". Pt does have insurance, although it is only Medicare part A- no medication coverage. CM requested MD to send any new discharge medications to meds to beds today, so that CM can voucher them off, if necessary. No further CM needs noted. Pt will DC home once medically ready for DC.     IMM Completed:   Yes, Case management has presented and reviewed IMM letter #2 to the patient and/or family/ POA. Patient and/or family/POA verbalized understanding of their medicare rights and appeal process if needed. Patient and/or family/POA has signed and placed today's date (3/29/24) and time (1305) on letter #2 on the the appropriate lines. Patient and/or family/POA, provided copy of signed letter and they are aware that this original copy of IMM letter #2 is available prior to discharge from the paper chart on the unit.  Electronic documentation has been entered into epic for IMM letter #2 and original paper copy has been added to the paper chart at the nurses station.     IMM Letter given to Patient/Family/Significant other/Guardian/POA/by:: Fior Bobby RN  IMM Letter date given:: 03/29/24  IMM Letter time given:: 1305      Thank you  Ella Bobby RN, BSN, CM  PCU   [phone]

## 2024-03-29 NOTE — PROGRESS NOTES
Cardiology Consult Service  Daily Progress Note        Admit Date:  3/23/2024  Primary cardiologist:  Dr Arrington / Dr Torres     Reason for Consultation/Chief Complaint: AHF    Subjective:     Gabriel Maya is a 43 y.o. male with a past medical history of bipolar disorder and PTSD.  Patient does not have health insurance.     Patient presented to the emergency room on 3/23 with progressively worse shortness of breath, chest pressure and URI symptoms x 1 week.  He was admitted for AF RVR and acute and new HFrEF.  EP was consulted, patient was seen by Dr Arrington / Abram Gardner CNP patient was placed on amiodarone and diltiazem drip along with Xarelto.  Patient underwent LINH followed by DC cardioversion on 3/26 with transient resumption of normal sinus rhythm, back into AFRVR.  Amiodarone and diltiazem drips were continued and patient was also placed on amiodarone 400 mg p.o. every 8 hours along with Toprol 100 mg daily.  Tentative plan includes repeat DC cardioversion within 48 hours (or 3/28/2024).     Echo 3/25/2024: Normal LV size and wall thickness, LVEF 20-25%, indeterminate diastolic function due to A-fib, no LV apical clot, normal LA size, normal RV size with mild dysfunction, mild MR/TR.      ECG 3/23/24 consistent with AF RVR, 173 bpm, no ischemic changes.     Patient has been on Lasix 60 mg IV twice daily.  He has been diuresing well and reports improving symptoms.  Heart failure team was consulted today to assess patient for further management of his newly diagnosed heart failure.  Patient is currently hemodynamically stable, still in atrial fibrillation with controlled ventricular response per telemetry.  He does not require any supplemental oxygen.  Creatinine up at 1.4 from 1.1.  High-sensitivity troponin x 2 negative.    Patient reports no previous cardiac history.  He denies any prior ischemic or congestive symptoms besides the symptoms that led to his admission.  He denies any palpitations  supplemental oxygen to off (or down to baseline supplemental oxygen requirements) for sats greater than 90%.  -Continue with Toprol 100 mg daily, spironolactone 25 mg daily  - Continue Jardiance 10 mg daily  - Will check free T3 and T4 to rule out thyroid abnormalities.  - If LVEF remains < 35% after 90 days of GDMT, will discuss referral to EP for possible device placement. In the meantime, patient may elect to receive a LifeVest (as outpatient).  - Patient will need an outpatient sleep study and likely BiPAP.    If LHC unremarkable, patient could be potentially released home and follow-up in my clinic within 1 week for heart failure therapy optimization with a pre-clinic BMP.  Consider resuming p.o. diuretics tomorrow to avoid fluid overload.          I have personally reviewed the reports and images of labs, radiological studies, cardiac studies including ECG's and telemetry, current and old medical records. The note was completed using EMR and Dragon dictation system. Every effort was made to ensure accuracy; however, inadvertent computerized transcription errors may be present.    All questions and concerns were addressed to the patient/family. Alternatives to my treatment were discussed.     Thank you for allowing to us to participate in the care or Gabriel Maya. Please call our service with questions.    Aldair Torres MD, [facility], [facility]  [facility] [place] [address] [address]  Ph: [phone]  Fax: [phone]

## 2024-03-29 NOTE — ANESTHESIA PRE-OP
[facility]  3/29/2024, 2:55 PM    H&P Update    I have reviewed the history and physical and examined the patient and find no relevant changes.   I have reviewed with the patient and/or family the risks, benefits, and alternatives to the procedure.    Pre-sedation Assessment    Patient:  Gabriel Maya   :   1980  Intended Procedure: cath and possible intervention  Procedure indications patient with cardiomyopathy low ejection fraction of 20 to 25%.  Cardiac cath performed to evaluate for coronary disease.  Atrial fibrillation is present and is back after having had cardioversion yesterday.    [facility] reviewed and agreed.  Medications reviewed  Allergies: No Known Allergies    Vitals:    24 1200 24 1300 24 1416 24 1447   BP: 131/86 128/82 115/71 115/72   Pulse: 77  66 66   Resp: 16 20 20 18   Temp: 97.7 °F (36.5 °C)  97.7 °F (36.5 °C)    TempSrc: Oral  Oral Oral   SpO2: 99% 100% 99% 96%   Weight:       Height:           Pre-Procedure Assessment/Plan:  ASA 2 - Patient with mild systemic disease with no functional limitations  Mallampati score : Mallampati 1Level of Sedation Plan:Mild sedation    Post Procedure plan: Return to same level of care  Adrien Bagley M.D.,FACC

## 2024-03-30 NOTE — PROGRESS NOTES
Physician Progress Note      PATIENT:               NICOL CASEY  CSN #:                  [id number]  :                       1980  ADMIT DATE:       3/23/2024 7:12 PM  DISCH DATE:        3/29/2024 9:20 PM  RESPONDING  PROVIDER #:        Delta Vallejo MD          QUERY TEXT:    Patient admitted with new onset A-Fib. with RVR.  Pt. is maintained on   Xarelto. If possible, please document in progress notes and discharge summary   if you are evaluating and/or treating any of the following:    The medical record reflects the following:  Risk Factors: atrial fibrillation, CHF, HTN, cardiomyopathy  Clinical Indicators: per MD 3/29 \"[id number] Score for Atrial Fibrillation   Stroke Risk 2\", transition to Xarelto after left heart  cath today\"  Treatment: Cardiology/EP consults, Cardiac cath. on 3/29, inpatient meds-   Xarelto 20 mg daily  Options provided:  -- Secondary hypercoagulable state in a patient with atrial fibrillation  -- Other - I will add my own diagnosis  -- Disagree - Not applicable / Not valid  -- Disagree - Clinically unable to determine / Unknown  -- Refer to Clinical Documentation Reviewer    PROVIDER RESPONSE TEXT:    This patient has secondary hypercoagulable state in a patient with atrial   fibrillation.    Query created by: Lauryn Velez on 3/29/2024 3:20 PM      Electronically signed by:  Delta Vallejo MD 3/30/2024 3:24 PM

## 2024-03-30 NOTE — PROGRESS NOTES
All IVs removed arterial site education provided verbally by nurse. Patient states understanding of all education regarding procedures and diagnoses. Patient taken out for discharge with belongings.

## 2024-03-30 NOTE — DISCHARGE SUMMARY
V2.0  Discharge Summary    Name:  Gabriel Maya /Age/Sex: 1980 (43 y.o. male)   Admit Date: 3/23/2024  Discharge Date: 3/30/24    MRN & CSN:  [id number] & [id number] Encounter Date and Time 3/30/24 3:10 PM EDT    Attending:  No att. providers found Discharging Provider: Delta Vallejo MD       Hospital Course:     Brief HPI: Gabriel Maya is a 43 y.o. male who presented with dyspnea on exertion and shortness of breath.  Found to have new onset congestive heart failure and atrial fibrillation    Brief Problem Based Course:   New onset atrial fibrillation-persistent.  Failed DC cardioversions x 2.  Ultimately patient ended up back in A-fib.  Continue Eliquis.  Outpatient follow-up with electrophysiology.  On amiodarone 200 mg daily metoprolol for rate rhythm.  Patient ruled out for MI with serial troponins  Acute on chronic heart failure with reduced ejection fraction-workup included a left heart cath which showed no evidence of coronary artery disease.  EF was 20-25.  Did have some intermediate diastolic dysfunction as well.  Patient treated with spironolactone Toprol Jardiance and low-dose Lasix which will be started next week.  Follow-up with Dr. Torres in CHF clinic in 1 week.  Outpatient sleep study also planned.  Likely will need CPAP or BiPAP for presumptive NEFTALI  Bipolar disorder-stable compensated.  Patient to see primary care physician about starting outpatient treatment  Acute renal failure-mild creatinine elevation at the time of discharge.  Diuretic holiday until early next week.  Follow-up CMP 1 week.  Advised to hold NSAIDs discontinue use      The patient expressed appropriate understanding of, and agreement with the discharge recommendations, medications, and plan.     Consults this admission:  IP CONSULT TO SPIRITUAL SERVICES  IP CONSULT TO CARDIOLOGY  IP CONSULT TO CARDIOLOGY  IP CONSULT TO SOCIAL WORK  IP CONSULT TO CARDIOLOGY    Discharge Diagnosis:   Atrial fibrillation with rapid  Date/Time    LABA1C 5.2 07/28/2021 02:35 PM     TSH: No results found for: \"TSH\"  Troponin: No results found for: \"TROPONINT\"  Lactic Acid: No results for input(s): \"LACTA\" in the last 72 hours.  BNP: No results for input(s): \"PROBNP\" in the last 72 hours.  UA:  Lab Results   Component Value Date/Time    NITRU Negative 03/23/2024 07:36 PM    COLORU Yellow 03/23/2024 07:36 PM    PHUR 5.5 03/23/2024 07:36 PM    WBCUA 3-5 03/23/2024 07:36 PM    RBCUA 0-2 03/23/2024 07:36 PM    MUCUS 3+ 03/23/2024 07:36 PM    BACTERIA 2+ 03/23/2024 07:36 PM    CLARITYU Clear 03/23/2024 07:36 PM    SPECGRAV 1.020 03/23/2024 07:36 PM    LEUKOCYTESUR Negative 03/23/2024 07:36 PM    UROBILINOGEN 0.2 03/23/2024 07:36 PM    BILIRUBINUR Negative 03/23/2024 07:36 PM    BLOODU TRACE-INTACT 03/23/2024 07:36 PM    GLUCOSEU Negative 03/23/2024 07:36 PM    KETUA Negative 03/23/2024 07:36 PM    AMORPHOUS 1+ 03/23/2024 07:36 PM     Urine Cultures: No results found for: \"LABURIN\"  Blood Cultures:   Lab Results   Component Value Date/Time    BC No Growth after 4 days of incubation. 03/23/2024 07:36 PM     Lab Results   Component Value Date/Time    BLOODCULT2 No Growth after 4 days of incubation. 03/23/2024 07:36 PM     Organism: No results found for: \"ORG\"    Time Spent Discharging patient 55 minutes    Electronically signed by Delta Vallejo MD on 3/30/2024 at 3:10 PM

## 2024-04-02 ENCOUNTER — OFFICE VISIT (OUTPATIENT)
Dept: CARDIOLOGY CLINIC | Age: 44
End: 2024-04-02

## 2024-04-02 ENCOUNTER — TELEPHONE (OUTPATIENT)
Dept: PRIMARY CARE CLINIC | Age: 44
End: 2024-04-02

## 2024-04-02 VITALS
BODY MASS INDEX: 43.27 KG/M2 | WEIGHT: 293 LBS | DIASTOLIC BLOOD PRESSURE: 80 MMHG | SYSTOLIC BLOOD PRESSURE: 100 MMHG | HEART RATE: 62 BPM

## 2024-04-02 DIAGNOSIS — I48.91 ATRIAL FIBRILLATION WITH RAPID VENTRICULAR RESPONSE (HCC): Primary | ICD-10-CM

## 2024-04-02 DIAGNOSIS — I48.91 ATRIAL FIBRILLATION WITH RAPID VENTRICULAR RESPONSE (HCC): ICD-10-CM

## 2024-04-02 LAB
ANION GAP SERPL CALCULATED.3IONS-SCNC: 16 MMOL/L (ref 3–16)
BUN SERPL-MCNC: 19 MG/DL (ref 7–20)
CALCIUM SERPL-MCNC: 9.8 MG/DL (ref 8.3–10.6)
CHLORIDE SERPL-SCNC: 102 MMOL/L (ref 99–110)
CO2 SERPL-SCNC: 22 MMOL/L (ref 21–32)
CREAT SERPL-MCNC: 1.3 MG/DL (ref 0.9–1.3)
GFR SERPLBLD CREATININE-BSD FMLA CKD-EPI: 69 ML/MIN/{1.73_M2}
GLUCOSE SERPL-MCNC: 96 MG/DL (ref 70–99)
MAGNESIUM SERPL-MCNC: 2 MG/DL (ref 1.8–2.4)
POTASSIUM SERPL-SCNC: 4.1 MMOL/L (ref 3.5–5.1)
SODIUM SERPL-SCNC: 140 MMOL/L (ref 136–145)

## 2024-04-02 PROCEDURE — 99214 OFFICE O/P EST MOD 30 MIN: CPT | Performed by: INTERNAL MEDICINE

## 2024-04-02 PROCEDURE — 93000 ELECTROCARDIOGRAM COMPLETE: CPT | Performed by: INTERNAL MEDICINE

## 2024-04-02 RX ORDER — METOPROLOL SUCCINATE 100 MG/1
100 TABLET, EXTENDED RELEASE ORAL 2 TIMES DAILY
Qty: 180 TABLET | Refills: 3 | Status: SHIPPED | OUTPATIENT
Start: 2024-04-02

## 2024-04-02 NOTE — TELEPHONE ENCOUNTER
Care Transitions Initial Follow Up Call    Outreach made within 2 business days of discharge: Yes    Patient: Gabriel Maya Patient : 1980   MRN: [id number]  Reason for Admission: There are no discharge diagnoses documented for the most recent discharge.  Discharge Date: 3/29/24       Spoke with: Gabriel Maya    Discharge department/facility: Home     TCM Interactive Patient Contact:  Was patient able to fill all prescriptions: Yes   Was patient instructed to bring all medications to the follow-up visit: No:   Is patient taking all medications as directed in the discharge summary? Yes  Does patient understand their discharge instructions: Yes  Does patient have questions or concerns that need addressed prior to 7-14 day follow up office visit: no    Scheduled appointment with PCP within 7-14 days  Patient scheduled for 24  Follow Up  Future Appointments   Date Time Provider Department Center   2024  9:30 AM Jojo Gardner APRN - CNP [facility]   2024  1:15 PM Aldair Torres MD [facility]       HUDSON CANNON MA

## 2024-04-02 NOTE — PROGRESS NOTES
Cc: PAFRVR, HFrEF due to NICMO    HPI:     Gabriel Maya is a 43 y.o. overweight male with history of likely NEFTALI untreated, PAFRVR, new HFrEF due to NICMO, bipolar disorder, PTSD. Patient works at [employer] and does not have health insurance.     Patient presented to the emergency room on 3/23/24 with progressively worse shortness of breath, chest pressure and URI symptoms x 1 week.  He was admitted for AF RVR and acute and new HFrEF.  EP was consulted, patient was seen by Dr Arrington / Abram Gardner, KANCHAN patient was placed on amiodarone and diltiazem drip along with Xarelto.  Patient underwent LINH followed by DC cardioversion on 3/26 with transient resumption of normal sinus rhythm, back into AFRVR.  Amiodarone and diltiazem drips were continued and patient was also placed on amiodarone 400 mg p.o. every 8 hours along with Toprol 100 mg daily.  Patient had repeat DC cardioversion on 3/28/2024 which failed.     Echo 3/25/2024: Normal LV size and wall thickness, LVEF 20-25%, indeterminate diastolic function due to A-fib, no LV apical clot, normal LA size, normal RV size with mild dysfunction, mild MR/TR.      ECG 3/23/24 consistent with AF RVR, 173 bpm, no ischemic changes.    LHC 3/29/2024: Normal coronaries, LVEDP 12 mmHg.    03/2024 TSH normal, free T3 and free T4 normal    Patient returns to the clinic today for follow-up.  He reports no complaints.  His weight is relatively stable at 293 pounds.  He is compliant with all of his medications and avoids salt.  He does not have a blood pressure cuff.  He needs a sleep study.    ECG 4/2/2024: AF  bpm, no ischemic changes.      Histories     Past Medical History:   has a past medical history of History of bipolar disorder & PTSD and Mixed hyperlipidemia.    Surgical History:   has a past surgical history that includes [place] tooth extraction and Inguinal hernia repair (Bilateral).     Social History:   reports that he has never smoked. He has never used

## 2024-04-02 NOTE — PATIENT INSTRUCTIONS
Patient will be started on new medication zarelto 20mg daily.  Patient verbalizes understanding of the need for treatment and education provided at today's visit. Additional education materials will be provided in the AVS.     Sleep Study    Blood work today  Follow up with TIANNA

## 2024-05-17 ENCOUNTER — OFFICE VISIT (OUTPATIENT)
Dept: CARDIOLOGY CLINIC | Age: 44
End: 2024-05-17

## 2024-05-17 VITALS
WEIGHT: 300.6 LBS | DIASTOLIC BLOOD PRESSURE: 110 MMHG | BODY MASS INDEX: 44.39 KG/M2 | SYSTOLIC BLOOD PRESSURE: 160 MMHG

## 2024-05-17 DIAGNOSIS — I48.91 ATRIAL FIBRILLATION WITH RAPID VENTRICULAR RESPONSE (HCC): Primary | ICD-10-CM

## 2024-05-17 DIAGNOSIS — G47.33 OSA (OBSTRUCTIVE SLEEP APNEA): ICD-10-CM

## 2024-05-17 DIAGNOSIS — I50.21 ACUTE SYSTOLIC CHF (CONGESTIVE HEART FAILURE), NYHA CLASS 2 (HCC): ICD-10-CM

## 2024-05-17 PROCEDURE — 99214 OFFICE O/P EST MOD 30 MIN: CPT | Performed by: INTERNAL MEDICINE

## 2024-05-17 RX ORDER — LOSARTAN POTASSIUM 25 MG/1
25 TABLET ORAL DAILY
Qty: 90 TABLET | Refills: 2 | Status: SHIPPED | OUTPATIENT
Start: 2024-05-17

## 2024-05-17 RX ORDER — METOPROLOL SUCCINATE 100 MG/1
100 TABLET, EXTENDED RELEASE ORAL 2 TIMES DAILY
Qty: 180 TABLET | Refills: 3 | Status: SHIPPED | OUTPATIENT
Start: 2024-05-17

## 2024-05-17 RX ORDER — AMIODARONE HYDROCHLORIDE 200 MG/1
200 TABLET ORAL DAILY
Qty: 30 TABLET | Refills: 0 | Status: SHIPPED | OUTPATIENT
Start: 2024-05-17

## 2024-05-17 RX ORDER — FUROSEMIDE 40 MG/1
40 TABLET ORAL DAILY
Qty: 60 TABLET | Refills: 3 | Status: SHIPPED | OUTPATIENT
Start: 2024-05-17

## 2024-05-17 RX ORDER — SPIRONOLACTONE 25 MG/1
25 TABLET ORAL DAILY
Qty: 30 TABLET | Refills: 3 | Status: SHIPPED | OUTPATIENT
Start: 2024-05-17

## 2024-05-17 RX ORDER — WARFARIN SODIUM 5 MG/1
5 TABLET ORAL DAILY
Qty: 90 TABLET | Refills: 3 | Status: SHIPPED | OUTPATIENT
Start: 2024-05-17

## 2024-05-17 NOTE — PROGRESS NOTES
Cc: PAFRVR, HFrEF due to NICMO    HPI:     Gabriel Maya is a 43 y.o. overweight male with history of likely NEFTALI untreated, PAFRVR, new HFrEF due to NICMO, bipolar disorder, PTSD. Patient works at [employer] and does not have health insurance.     Patient presented to the emergency room on 3/23/24 with progressively worse shortness of breath, chest pressure and URI symptoms x 1 week.  He was admitted for AF RVR and acute and new HFrEF.  EP was consulted, patient was seen by Dr Arrington / Abram Gardner, KANCHAN patient was placed on amiodarone and diltiazem drip along with Xarelto.  Patient underwent LINH followed by DC cardioversion on 3/26 with transient resumption of normal sinus rhythm, back into AFRVR.  Amiodarone and diltiazem drips were continued and patient was also placed on amiodarone 400 mg p.o. every 8 hours along with Toprol 100 mg daily.  Patient had repeat DC cardioversion on 3/28/2024 which failed.     Echo 3/25/2024: Normal LV size and wall thickness, LVEF 20-25%, indeterminate diastolic function due to A-fib, no LV apical clot, normal LA size, normal RV size with mild dysfunction, mild MR/TR.      ECG 3/23/24 consistent with AF RVR, 173 bpm, no ischemic changes.    LHC 3/29/2024: Normal coronaries, LVEDP 12 mmHg.    03/2024 TSH normal, free T3 and free T4 normal    Patient returns to the clinic today for follow-up.  He reports no complaints.  Patient reports he ran out of his medications about 2 to 3 days ago.  He is very concerned about the cost of his medications.      Histories     Past Medical History:   has a past medical history of History of bipolar disorder & PTSD and Mixed hyperlipidemia.    Surgical History:   has a past surgical history that includes Inez tooth extraction and Inguinal hernia repair (Bilateral).     Social History:   reports that he has never smoked. He has never used smokeless tobacco. He reports current alcohol use. He reports that he does not use drugs.     Family

## 2024-05-20 ENCOUNTER — TELEPHONE (OUTPATIENT)
Dept: CARDIOLOGY CLINIC | Age: 44
End: 2024-05-20

## 2024-05-20 NOTE — TELEPHONE ENCOUNTER
----- Message from Elina Giron sent at 5/20/2024  8:42 AM EDT -----  Tried calling patient; didn't  and couldn't leave a voicemail since his mailbox is full.  Thanks    ----- Message -----  From: Shirley Tay RN  Sent: 5/20/2024   8:24 AM EDT  To: [facility]     Please schedule pt for f/u with UL on 5/23 at 1245 (in notes: AF with HFrEF)  Thanks,  Kadie  ----- Message -----  From: Shannon Shaikh MD  Sent: 5/19/2024   8:06 PM EDT  To: Shirley Tay RN    Lets see him in next few days

## 2024-05-20 NOTE — TELEPHONE ENCOUNTER
Called the patient unable to LVM, so I reached out to his emergency contact/Parent Carlie who is also listed on his communications form and left a voicemail. [phone]

## 2024-05-21 ENCOUNTER — TELEPHONE (OUTPATIENT)
Dept: PHARMACY | Age: 44
End: 2024-05-21

## 2024-05-21 NOTE — TELEPHONE ENCOUNTER
Received referral from Dr. Torres to manage warfarin therapy. Attempted to call patient in order to schedule an appointment.  Mailbox full. Unable to LVM. Called patient contact- Carlie and YENY asking for pt to call back.     Of note, pt was switched from xarelto to warfarin on 5/17/24 due to lack of insurance.    Maira Guillermo, PharmD  [facility]   [facility] Ph: [phone]  [place] Ph: [phone]  5/21/2024 11:00 AM

## 2024-05-23 ENCOUNTER — TELEPHONE (OUTPATIENT)
Dept: CARDIOLOGY CLINIC | Age: 44
End: 2024-05-23

## 2024-05-23 NOTE — TELEPHONE ENCOUNTER
Pt called to cancel 05.23.2024 appt with Dr. Deshpande due to family emergency. Pt would like to rs anytime starting 05.28.2024. Please advise what to offer.    [phone]

## 2024-05-26 ENCOUNTER — HOSPITAL ENCOUNTER (EMERGENCY)
Age: 44
Discharge: HOME | End: 2024-05-26
Attending: EMERGENCY MEDICINE

## 2024-05-26 ENCOUNTER — APPOINTMENT (OUTPATIENT)
Dept: CT IMAGING | Age: 44
End: 2024-05-26

## 2024-05-26 VITALS
OXYGEN SATURATION: 95 % | HEIGHT: 69 IN | RESPIRATION RATE: 20 BRPM | WEIGHT: 300 LBS | HEART RATE: 68 BPM | SYSTOLIC BLOOD PRESSURE: 137 MMHG | DIASTOLIC BLOOD PRESSURE: 72 MMHG | BODY MASS INDEX: 44.43 KG/M2 | TEMPERATURE: 97.6 F

## 2024-05-26 DIAGNOSIS — R19.7 NAUSEA VOMITING AND DIARRHEA: ICD-10-CM

## 2024-05-26 DIAGNOSIS — R74.8 ELEVATED LIPASE: ICD-10-CM

## 2024-05-26 DIAGNOSIS — D72.829 LEUKOCYTOSIS, UNSPECIFIED TYPE: ICD-10-CM

## 2024-05-26 DIAGNOSIS — R11.2 NAUSEA VOMITING AND DIARRHEA: ICD-10-CM

## 2024-05-26 DIAGNOSIS — R10.10 ACUTE UPPER ABDOMINAL PAIN: Primary | ICD-10-CM

## 2024-05-26 LAB
ALBUMIN SERPL-MCNC: 4.7 G/DL (ref 3.4–5)
ALBUMIN/GLOB SERPL: 1.1 {RATIO} (ref 1.1–2.2)
ALP SERPL-CCNC: 63 U/L (ref 40–129)
ALT SERPL-CCNC: 13 U/L (ref 10–40)
ANION GAP SERPL CALCULATED.3IONS-SCNC: 16 MMOL/L (ref 3–16)
AST SERPL-CCNC: 27 U/L (ref 15–37)
BASOPHILS # BLD: 0.1 K/UL (ref 0–0.2)
BASOPHILS NFR BLD: 0.6 %
BILIRUB SERPL-MCNC: 0.6 MG/DL (ref 0–1)
BUN SERPL-MCNC: 23 MG/DL (ref 7–20)
CALCIUM SERPL-MCNC: 10.2 MG/DL (ref 8.3–10.6)
CHLORIDE SERPL-SCNC: 102 MMOL/L (ref 99–110)
CO2 SERPL-SCNC: 19 MMOL/L (ref 21–32)
CREAT SERPL-MCNC: 1.2 MG/DL (ref 0.9–1.3)
DEPRECATED RDW RBC AUTO: 14.8 % (ref 12.4–15.4)
EOSINOPHIL # BLD: 0.2 K/UL (ref 0–0.6)
EOSINOPHIL NFR BLD: 0.7 %
GFR SERPLBLD CREATININE-BSD FMLA CKD-EPI: 76 ML/MIN/{1.73_M2}
GLUCOSE SERPL-MCNC: 145 MG/DL (ref 70–99)
HCT VFR BLD AUTO: 54.1 % (ref 40.5–52.5)
HGB BLD-MCNC: 18.3 G/DL (ref 13.5–17.5)
INR PPP: 2.61 (ref 0.85–1.15)
LIPASE SERPL-CCNC: 750 U/L (ref 13–60)
LYMPHOCYTES # BLD: 1.7 K/UL (ref 1–5.1)
LYMPHOCYTES NFR BLD: 7.3 %
MCH RBC QN AUTO: 29.8 PG (ref 26–34)
MCHC RBC AUTO-ENTMCNC: 33.8 G/DL (ref 31–36)
MCV RBC AUTO: 88 FL (ref 80–100)
MONOCYTES # BLD: 1.6 K/UL (ref 0–1.3)
MONOCYTES NFR BLD: 6.8 %
NEUTROPHILS # BLD: 20 K/UL (ref 1.7–7.7)
NEUTROPHILS NFR BLD: 84.6 %
PLATELET # BLD AUTO: 270 K/UL (ref 135–450)
PMV BLD AUTO: 9.5 FL (ref 5–10.5)
POTASSIUM SERPL-SCNC: 3.6 MMOL/L (ref 3.5–5.1)
PROT SERPL-MCNC: 9.1 G/DL (ref 6.4–8.2)
PROTHROMBIN TIME: 27.9 SEC (ref 11.9–14.9)
RBC # BLD AUTO: 6.14 M/UL (ref 4.2–5.9)
SODIUM SERPL-SCNC: 137 MMOL/L (ref 136–145)
WBC # BLD AUTO: 23.7 K/UL (ref 4–11)

## 2024-05-26 PROCEDURE — 85025 COMPLETE CBC W/AUTO DIFF WBC: CPT

## 2024-05-26 PROCEDURE — 96361 HYDRATE IV INFUSION ADD-ON: CPT

## 2024-05-26 PROCEDURE — 36415 COLL VENOUS BLD VENIPUNCTURE: CPT

## 2024-05-26 PROCEDURE — 74177 CT ABD & PELVIS W/CONTRAST: CPT

## 2024-05-26 PROCEDURE — 80053 COMPREHEN METABOLIC PANEL: CPT

## 2024-05-26 PROCEDURE — 6370000000 HC RX 637 (ALT 250 FOR IP): Performed by: EMERGENCY MEDICINE

## 2024-05-26 PROCEDURE — 6360000004 HC RX CONTRAST MEDICATION: Performed by: EMERGENCY MEDICINE

## 2024-05-26 PROCEDURE — 99285 EMERGENCY DEPT VISIT HI MDM: CPT

## 2024-05-26 PROCEDURE — 83690 ASSAY OF LIPASE: CPT

## 2024-05-26 PROCEDURE — 2580000003 HC RX 258: Performed by: EMERGENCY MEDICINE

## 2024-05-26 PROCEDURE — 6360000002 HC RX W HCPCS: Performed by: EMERGENCY MEDICINE

## 2024-05-26 PROCEDURE — 96374 THER/PROPH/DIAG INJ IV PUSH: CPT

## 2024-05-26 PROCEDURE — 85610 PROTHROMBIN TIME: CPT

## 2024-05-26 RX ORDER — ONDANSETRON 2 MG/ML
8 INJECTION INTRAMUSCULAR; INTRAVENOUS ONCE
Status: COMPLETED | OUTPATIENT
Start: 2024-05-26 | End: 2024-05-26

## 2024-05-26 RX ORDER — HYOSCYAMINE SULFATE 0.125 MG
125 TABLET ORAL ONCE
Status: COMPLETED | OUTPATIENT
Start: 2024-05-26 | End: 2024-05-26

## 2024-05-26 RX ORDER — 0.9 % SODIUM CHLORIDE 0.9 %
1000 INTRAVENOUS SOLUTION INTRAVENOUS ONCE
Status: COMPLETED | OUTPATIENT
Start: 2024-05-26 | End: 2024-05-26

## 2024-05-26 RX ORDER — LOPERAMIDE HYDROCHLORIDE 2 MG/1
2 TABLET ORAL 4 TIMES DAILY PRN
Qty: 20 TABLET | Refills: 0 | Status: SHIPPED | OUTPATIENT
Start: 2024-05-26 | End: 2024-06-05

## 2024-05-26 RX ORDER — LOPERAMIDE HYDROCHLORIDE 2 MG/1
2 CAPSULE ORAL ONCE
Status: COMPLETED | OUTPATIENT
Start: 2024-05-26 | End: 2024-05-26

## 2024-05-26 RX ORDER — ONDANSETRON 8 MG/1
8 TABLET, ORALLY DISINTEGRATING ORAL EVERY 8 HOURS PRN
Qty: 21 TABLET | Refills: 0 | Status: SHIPPED | OUTPATIENT
Start: 2024-05-26 | End: 2024-06-02

## 2024-05-26 RX ADMIN — ONDANSETRON 8 MG: 2 INJECTION INTRAMUSCULAR; INTRAVENOUS at 09:08

## 2024-05-26 RX ADMIN — IOPAMIDOL 100 ML: 755 INJECTION, SOLUTION INTRAVENOUS at 10:48

## 2024-05-26 RX ADMIN — LOPERAMIDE HYDROCHLORIDE 2 MG: 2 CAPSULE ORAL at 09:17

## 2024-05-26 RX ADMIN — HYOSCYAMINE SULFATE 125 MCG: 0.12 TABLET ORAL at 09:37

## 2024-05-26 RX ADMIN — SODIUM CHLORIDE 1000 ML: 9 INJECTION, SOLUTION INTRAVENOUS at 09:18

## 2024-05-26 ASSESSMENT — PAIN - FUNCTIONAL ASSESSMENT: PAIN_FUNCTIONAL_ASSESSMENT: NONE - DENIES PAIN

## 2024-05-26 NOTE — ED PROVIDER NOTES
g/dL    RDW 14.8 12.4 - 15.4 %    Platelets 270 135 - 450 K/uL    MPV 9.5 5.0 - 10.5 fL    Neutrophils % 84.6 %    Lymphocytes % 7.3 %    Monocytes % 6.8 %    Eosinophils % 0.7 %    Basophils % 0.6 %    Neutrophils Absolute 20.0 (H) 1.7 - 7.7 K/uL    Lymphocytes Absolute 1.7 1.0 - 5.1 K/uL    Monocytes Absolute 1.6 (H) 0.0 - 1.3 K/uL    Eosinophils Absolute 0.2 0.0 - 0.6 K/uL    Basophils Absolute 0.1 0.0 - 0.2 K/uL   Comprehensive Metabolic Panel w/ Reflex to MG   Result Value Ref Range    Sodium 137 136 - 145 mmol/L    Potassium reflex Magnesium 3.6 3.5 - 5.1 mmol/L    Chloride 102 99 - 110 mmol/L    CO2 19 (L) 21 - 32 mmol/L    Anion Gap 16 3 - 16    Glucose 145 (H) 70 - 99 mg/dL    BUN 23 (H) 7 - 20 mg/dL    Creatinine 1.2 0.9 - 1.3 mg/dL    Est, Glom Filt Rate 76 >60    Calcium 10.2 8.3 - 10.6 mg/dL    Total Protein 9.1 (H) 6.4 - 8.2 g/dL    Albumin 4.7 3.4 - 5.0 g/dL    Albumin/Globulin Ratio 1.1 1.1 - 2.2    Total Bilirubin 0.6 0.0 - 1.0 mg/dL    Alkaline Phosphatase 63 40 - 129 U/L    ALT 13 10 - 40 U/L    AST 27 15 - 37 U/L   Lipase   Result Value Ref Range    Lipase 750.0 (H) 13.0 - 60.0 U/L   Protime-INR   Result Value Ref Range    Protime 27.9 (H) 11.9 - 14.9 sec    INR 2.61 (H) 0.85 - 1.15       Medications Given During ED Visit  Medications   sodium chloride 0.9 % bolus 1,000 mL (0 mLs IntraVENous Stopped 5/26/24 1500)   ondansetron (ZOFRAN) injection 8 mg (8 mg IntraVENous Given 5/26/24 0908)   loperamide (IMODIUM) capsule 2 mg (2 mg Oral Given 5/26/24 0917)   hyoscyamine (ANASPAZ;LEVSIN) tablet 125 mcg (125 mcg Oral Given 5/26/24 9188)   iopamidol (ISOVUE-370) 76 % injection 100 mL (100 mLs IntraVENous Given 5/26/24 6574)       Records Reviewed  Previous lab results.    Chronic Conditions affecting care   has a past medical history of Atrial fibrillation (HCC), History of bipolar disorder & PTSD (07/27/2021), Hypertension, and Mixed hyperlipidemia (07/29/2021).    MDM and ED Course  On my initial

## 2024-05-26 NOTE — ED NOTES
Pt given d/c instructions with return verbalization including medications. Emphasis on f/u, Patient states that she feels 100% better.To return with worsening s/s. Pt ambulated to lobby with steady gait.

## 2024-05-28 NOTE — TELEPHONE ENCOUNTER
Scheduled 5/29- Provided address/suite of clinic, phone number, appt date/time.     Nida Guillermo, PharmD, BCACP  [facility]   [facility] [place] Ph: [phone]  [facility] [place] Ph: [phone]  5/28/2024 5:21 PM

## 2024-05-29 ENCOUNTER — ANTI-COAG VISIT (OUTPATIENT)
Dept: PHARMACY | Age: 44
End: 2024-05-29
Payer: MEDICARE

## 2024-05-29 DIAGNOSIS — I48.91 ATRIAL FIBRILLATION WITH RAPID VENTRICULAR RESPONSE (HCC): Primary | ICD-10-CM

## 2024-05-29 DIAGNOSIS — I50.21 ACUTE SYSTOLIC CHF (CONGESTIVE HEART FAILURE), NYHA CLASS 2 (HCC): ICD-10-CM

## 2024-05-29 LAB — INTERNATIONAL NORMALIZATION RATIO, POC: 4.7

## 2024-05-29 PROCEDURE — 99212 OFFICE O/P EST SF 10 MIN: CPT

## 2024-05-29 PROCEDURE — 85610 PROTHROMBIN TIME: CPT

## 2024-05-29 NOTE — PROGRESS NOTES
ANTICOAGULATION SERVICE    Gabriel Maya is a 43 y.o. male with PMHx significant for CHF, a.fib, NEFTALI who presents to clinic 5/29/2024 for anticoagulation monitoring and adjustment.    Anticoagulation Indication(s):  Afib, CHF     Referring Physician:   Dr. Aldair Torres  Goal INR Range:  2-3  Duration of Anticoagulation Therapy:  indefinite  Time of day dose taken: AM  Product patient has at home: warfarin 5 mg (peach color)    Pertinent labs:  Lab Results   Component Value Date    INR 2.61 (H) 05/26/2024    INR 1.38 (H) 03/28/2024    INR 1.11 03/23/2024         INR Summary                           Warfarin regimen (mg)  Date INR   A/P   Sun Mon Tue Wed Thu Fri Sat Mg/wk    Last CBC:  Lab Results   Component Value Date    RBC 6.14 (H) 05/26/2024    HGB 18.3 (H) 05/26/2024    HCT 54.1 (H) 05/26/2024    MCV 88.0 05/26/2024    MCH 29.8 05/26/2024    MPV 9.5 05/26/2024    RDW 14.8 05/26/2024     05/26/2024       Patient History:  Recent hospitalizations/HC visits 5/26: ED for abdominal pain / dehydration  3/24-29: new onset a.fib and CHF   Recent medication changes 3/29: started on xarelto  5/17: Xarelto stopped, changed to warfarin   Medications taken regularly that may interact with warfarin or alter INR amiodarone   Warfarin dose taken as prescribed No - Uses pillbox   Signs/symptoms of bleeding No -  H/o bleeding   Vitamin K intake Normally has ~1 servings of green, leafy vegetables per day   Recent vomiting/diarrhea/fever, changes in weight or activity level None reported   Tobacco or alcohol use Patient reports no smoking   Patient reports having occasional drink - social   Upcoming surgeries or procedures None reported     Assessment/Plan:  Patient's INR was supratherapeutic today (4.7). Patient confirms warfarin dose and reviewed all medications. Patient works at [employer] and reports eating a salad daily but states mostly iceberg lettuce. Patient wife lives out of state at this time but will hopefully

## 2024-06-10 ENCOUNTER — TELEPHONE (OUTPATIENT)
Dept: PHARMACY | Age: 44
End: 2024-06-10

## 2024-06-10 NOTE — TELEPHONE ENCOUNTER
Patient no showed to anticoagulation clinic today. Called patient and LVM to reschedule INR check ASAP.     Archana Herman, PharmD, [facility]  [facility]  [place]: [phone]  [place]: [phone]  6/10/2024 1:12 PM

## 2024-06-12 ENCOUNTER — APPOINTMENT (OUTPATIENT)
Dept: PHARMACY | Age: 44
End: 2024-06-12
Payer: MEDICARE

## 2024-06-12 RX ORDER — AMIODARONE HYDROCHLORIDE 200 MG/1
200 TABLET ORAL DAILY
Qty: 90 TABLET | Refills: 3 | Status: SHIPPED | OUTPATIENT
Start: 2024-06-12

## 2024-06-13 ENCOUNTER — ANTI-COAG VISIT (OUTPATIENT)
Dept: PHARMACY | Age: 44
End: 2024-06-13

## 2024-06-13 DIAGNOSIS — I50.21 ACUTE SYSTOLIC CHF (CONGESTIVE HEART FAILURE), NYHA CLASS 2 (HCC): ICD-10-CM

## 2024-06-13 DIAGNOSIS — I48.91 ATRIAL FIBRILLATION WITH RAPID VENTRICULAR RESPONSE (HCC): Primary | ICD-10-CM

## 2024-06-13 PROBLEM — I42.9 CARDIOMYOPATHY (HCC): Chronic | Status: ACTIVE | Noted: 2024-03-29

## 2024-06-13 PROBLEM — E66.813 CLASS 3 SEVERE OBESITY DUE TO EXCESS CALORIES WITHOUT SERIOUS COMORBIDITY WITH BODY MASS INDEX (BMI) OF 40.0 TO 44.9 IN ADULT: Chronic | Status: ACTIVE | Noted: 2021-07-27

## 2024-06-13 PROBLEM — I48.0 PAROXYSMAL ATRIAL FIBRILLATION (HCC): Chronic | Status: ACTIVE | Noted: 2024-03-29

## 2024-06-13 PROBLEM — E66.01 CLASS 3 SEVERE OBESITY DUE TO EXCESS CALORIES WITHOUT SERIOUS COMORBIDITY WITH BODY MASS INDEX (BMI) OF 40.0 TO 44.9 IN ADULT (HCC): Chronic | Status: ACTIVE | Noted: 2021-07-27

## 2024-06-13 LAB — INTERNATIONAL NORMALIZATION RATIO, POC: 4.1

## 2024-06-13 PROCEDURE — 99212 OFFICE O/P EST SF 10 MIN: CPT | Performed by: PHARMACIST

## 2024-06-13 PROCEDURE — 85610 PROTHROMBIN TIME: CPT | Performed by: PHARMACIST

## 2024-06-13 NOTE — PROGRESS NOTES
ANTICOAGULATION SERVICE    Gabriel Maya is a 43 y.o. male with PMHx significant for CHF, Afib, NEFTALI who presents to clinic 6/13/2024 for anticoagulation monitoring and adjustment. Patient was switched from Xarelto to warfarin on 5/17/24 due to lack of insurance.     Anticoagulation Indication(s):  Afib, CHF     Referring Physician:   Dr. Aldair Torres  Goal INR Range:  2-3  Duration of Anticoagulation Therapy:  indefinite  Time of day dose taken: AM  Product patient has at home: warfarin 5 mg (peach color)    [id number] Score for Atrial Fibrillation Stroke Risk   Risk   Factors  Component Value   C CHF Yes 1   H HTN Yes 1   A2 Age >= 75 No,  (43 y.o.) 0   D DM No 0   S2 Prior Stroke/TIA No 0   V Vascular Disease No 0   A Age 65-74 No,  (43 y.o.) 0   Sc Sex male 0    [id number]  Score  2   Score last updated 6/13/24 10:23 AM EDT    Click here for a link to the UpToDate guideline \"Atrial Fibrillation: Anticoagulation therapy to prevent embolization    Disclaimer: Risk Score calculation is dependent on accuracy of patient problem list and past encounter diagnosis.      Pertinent labs:  Lab Results   Component Value Date    INR 4.7 05/29/2024    INR 2.61 (H) 05/26/2024    INR 1.38 (H) 03/28/2024    INR 1.11 03/23/2024         INR Summary                           Warfarin regimen (mg)  Date INR   A/P   Sun Mon Tue Wed Thu Fri Sat Mg/wk  6/13 4.1 Above goal, hold+dec 5 2.5 5 2.5 0/5 2.5 5 27.5  5/29 4.7 Above goal, hold+dec 5 2.5 5 0/5 0/5 2.5 5 30  5/17 Started warfarin  5 5 5 5 5 5 5 35    Last CBC:  Lab Results   Component Value Date    RBC 6.14 (H) 05/26/2024    HGB 18.3 (H) 05/26/2024    HCT 54.1 (H) 05/26/2024    MCV 88.0 05/26/2024    MCH 29.8 05/26/2024    MPV 9.5 05/26/2024    RDW 14.8 05/26/2024     05/26/2024       Patient History:  Recent hospitalizations/HC visits 5/26: ED for abdominal pain / dehydration  3/24-29: new onset a.fib and CHF   Recent medication changes 3/29: started on

## 2024-06-14 ENCOUNTER — TELEPHONE (OUTPATIENT)
Dept: SLEEP CENTER | Age: 44
End: 2024-06-14

## 2024-06-14 NOTE — TELEPHONE ENCOUNTER
Called to schedule a PSG per [place]     Self pay     Vm is full - could not leave a msg.     
Normal vision: sees adequately in most situations; can see medication labels, newsprint

## 2024-06-18 ENCOUNTER — HOSPITAL ENCOUNTER (INPATIENT)
Age: 44
LOS: 1 days | Discharge: HOME | DRG: 309 | End: 2024-06-19
Attending: EMERGENCY MEDICINE | Admitting: HOSPITALIST
Payer: MEDICARE

## 2024-06-18 ENCOUNTER — APPOINTMENT (OUTPATIENT)
Dept: GENERAL RADIOLOGY | Age: 44
DRG: 309 | End: 2024-06-18
Payer: MEDICARE

## 2024-06-18 DIAGNOSIS — R11.2 NAUSEA VOMITING AND DIARRHEA: ICD-10-CM

## 2024-06-18 DIAGNOSIS — R19.7 NAUSEA VOMITING AND DIARRHEA: ICD-10-CM

## 2024-06-18 DIAGNOSIS — I48.92 ATRIAL FLUTTER WITH RAPID VENTRICULAR RESPONSE (HCC): Primary | ICD-10-CM

## 2024-06-18 PROBLEM — Z86.79 HX OF ATRIAL FLUTTER: Status: ACTIVE | Noted: 2024-06-18

## 2024-06-18 LAB
ALBUMIN SERPL-MCNC: 4.6 G/DL (ref 3.4–5)
ALBUMIN/GLOB SERPL: 1.1 {RATIO} (ref 1.1–2.2)
ALP SERPL-CCNC: 67 U/L (ref 40–129)
ALT SERPL-CCNC: 24 U/L (ref 10–40)
ANION GAP SERPL CALCULATED.3IONS-SCNC: 16 MMOL/L (ref 3–16)
AST SERPL-CCNC: 33 U/L (ref 15–37)
BASOPHILS # BLD: 0.1 K/UL (ref 0–0.2)
BASOPHILS NFR BLD: 1 %
BILIRUB SERPL-MCNC: 0.8 MG/DL (ref 0–1)
BUN SERPL-MCNC: 16 MG/DL (ref 7–20)
CALCIUM SERPL-MCNC: 9.8 MG/DL (ref 8.3–10.6)
CHLORIDE SERPL-SCNC: 103 MMOL/L (ref 99–110)
CO2 SERPL-SCNC: 19 MMOL/L (ref 21–32)
CREAT SERPL-MCNC: 1.4 MG/DL (ref 0.9–1.3)
DEPRECATED RDW RBC AUTO: 15.7 % (ref 12.4–15.4)
EOSINOPHIL # BLD: 0.1 K/UL (ref 0–0.6)
EOSINOPHIL NFR BLD: 0.7 %
GFR SERPLBLD CREATININE-BSD FMLA CKD-EPI: 63 ML/MIN/{1.73_M2}
GLUCOSE SERPL-MCNC: 197 MG/DL (ref 70–99)
HCT VFR BLD AUTO: 50.1 % (ref 40.5–52.5)
HGB BLD-MCNC: 16.8 G/DL (ref 13.5–17.5)
INR PPP: 2.36 (ref 0.85–1.15)
LIPASE SERPL-CCNC: 34 U/L (ref 13–60)
LYMPHOCYTES # BLD: 2.3 K/UL (ref 1–5.1)
LYMPHOCYTES NFR BLD: 15.5 %
MCH RBC QN AUTO: 29.7 PG (ref 26–34)
MCHC RBC AUTO-ENTMCNC: 33.6 G/DL (ref 31–36)
MCV RBC AUTO: 88.4 FL (ref 80–100)
MONOCYTES # BLD: 1 K/UL (ref 0–1.3)
MONOCYTES NFR BLD: 6.5 %
NEUTROPHILS # BLD: 11.3 K/UL (ref 1.7–7.7)
NEUTROPHILS NFR BLD: 76.3 %
PLATELET # BLD AUTO: 295 K/UL (ref 135–450)
PMV BLD AUTO: 9.9 FL (ref 5–10.5)
POTASSIUM SERPL-SCNC: 3.8 MMOL/L (ref 3.5–5.1)
PROT SERPL-MCNC: 8.8 G/DL (ref 6.4–8.2)
PROTHROMBIN TIME: 25.8 SEC (ref 11.9–14.9)
RBC # BLD AUTO: 5.66 M/UL (ref 4.2–5.9)
SODIUM SERPL-SCNC: 138 MMOL/L (ref 136–145)
TROPONIN, HIGH SENSITIVITY: 6 NG/L (ref 0–22)
TROPONIN, HIGH SENSITIVITY: <6 NG/L (ref 0–22)
WBC # BLD AUTO: 14.8 K/UL (ref 4–11)

## 2024-06-18 PROCEDURE — 2580000003 HC RX 258: Performed by: EMERGENCY MEDICINE

## 2024-06-18 PROCEDURE — 93005 ELECTROCARDIOGRAM TRACING: CPT | Performed by: EMERGENCY MEDICINE

## 2024-06-18 PROCEDURE — 71045 X-RAY EXAM CHEST 1 VIEW: CPT

## 2024-06-18 PROCEDURE — 85610 PROTHROMBIN TIME: CPT

## 2024-06-18 PROCEDURE — 96365 THER/PROPH/DIAG IV INF INIT: CPT

## 2024-06-18 PROCEDURE — 1200000000 HC SEMI PRIVATE

## 2024-06-18 PROCEDURE — 99285 EMERGENCY DEPT VISIT HI MDM: CPT

## 2024-06-18 PROCEDURE — 80053 COMPREHEN METABOLIC PANEL: CPT

## 2024-06-18 PROCEDURE — 2500000003 HC RX 250 WO HCPCS: Performed by: EMERGENCY MEDICINE

## 2024-06-18 PROCEDURE — 84484 ASSAY OF TROPONIN QUANT: CPT

## 2024-06-18 PROCEDURE — 96375 TX/PRO/DX INJ NEW DRUG ADDON: CPT

## 2024-06-18 PROCEDURE — 6360000002 HC RX W HCPCS: Performed by: EMERGENCY MEDICINE

## 2024-06-18 PROCEDURE — 85025 COMPLETE CBC W/AUTO DIFF WBC: CPT

## 2024-06-18 PROCEDURE — 96376 TX/PRO/DX INJ SAME DRUG ADON: CPT

## 2024-06-18 PROCEDURE — 83690 ASSAY OF LIPASE: CPT

## 2024-06-18 PROCEDURE — 6370000000 HC RX 637 (ALT 250 FOR IP): Performed by: HOSPITALIST

## 2024-06-18 PROCEDURE — 2580000003 HC RX 258: Performed by: HOSPITALIST

## 2024-06-18 RX ORDER — POLYETHYLENE GLYCOL 3350 17 G/17G
17 POWDER, FOR SOLUTION ORAL DAILY PRN
Status: DISCONTINUED | OUTPATIENT
Start: 2024-06-18 | End: 2024-06-19 | Stop reason: HOSPADM

## 2024-06-18 RX ORDER — DILTIAZEM HYDROCHLORIDE 5 MG/ML
10 INJECTION INTRAVENOUS ONCE
Status: COMPLETED | OUTPATIENT
Start: 2024-06-18 | End: 2024-06-18

## 2024-06-18 RX ORDER — AMIODARONE HYDROCHLORIDE 200 MG/1
200 TABLET ORAL DAILY
Status: DISCONTINUED | OUTPATIENT
Start: 2024-06-19 | End: 2024-06-19 | Stop reason: HOSPADM

## 2024-06-18 RX ORDER — ACETAMINOPHEN 325 MG/1
650 TABLET ORAL EVERY 6 HOURS PRN
Status: DISCONTINUED | OUTPATIENT
Start: 2024-06-18 | End: 2024-06-19 | Stop reason: HOSPADM

## 2024-06-18 RX ORDER — SODIUM CHLORIDE 0.9 % (FLUSH) 0.9 %
5-40 SYRINGE (ML) INJECTION PRN
Status: DISCONTINUED | OUTPATIENT
Start: 2024-06-18 | End: 2024-06-19 | Stop reason: HOSPADM

## 2024-06-18 RX ORDER — ONDANSETRON 2 MG/ML
4 INJECTION INTRAMUSCULAR; INTRAVENOUS ONCE
Status: COMPLETED | OUTPATIENT
Start: 2024-06-18 | End: 2024-06-18

## 2024-06-18 RX ORDER — ONDANSETRON 2 MG/ML
4 INJECTION INTRAMUSCULAR; INTRAVENOUS EVERY 6 HOURS PRN
Status: DISCONTINUED | OUTPATIENT
Start: 2024-06-18 | End: 2024-06-19 | Stop reason: HOSPADM

## 2024-06-18 RX ORDER — ONDANSETRON 4 MG/1
4 TABLET, ORALLY DISINTEGRATING ORAL EVERY 8 HOURS PRN
Status: DISCONTINUED | OUTPATIENT
Start: 2024-06-18 | End: 2024-06-19 | Stop reason: HOSPADM

## 2024-06-18 RX ORDER — SODIUM CHLORIDE 9 MG/ML
INJECTION, SOLUTION INTRAVENOUS CONTINUOUS
Status: ACTIVE | OUTPATIENT
Start: 2024-06-18 | End: 2024-06-19

## 2024-06-18 RX ORDER — ACETAMINOPHEN 650 MG/1
650 SUPPOSITORY RECTAL EVERY 6 HOURS PRN
Status: DISCONTINUED | OUTPATIENT
Start: 2024-06-18 | End: 2024-06-19 | Stop reason: HOSPADM

## 2024-06-18 RX ORDER — SODIUM CHLORIDE 0.9 % (FLUSH) 0.9 %
5-40 SYRINGE (ML) INJECTION EVERY 12 HOURS SCHEDULED
Status: DISCONTINUED | OUTPATIENT
Start: 2024-06-18 | End: 2024-06-19 | Stop reason: HOSPADM

## 2024-06-18 RX ORDER — 0.9 % SODIUM CHLORIDE 0.9 %
1000 INTRAVENOUS SOLUTION INTRAVENOUS ONCE
Status: COMPLETED | OUTPATIENT
Start: 2024-06-18 | End: 2024-06-18

## 2024-06-18 RX ORDER — WARFARIN SODIUM 5 MG/1
5 TABLET ORAL
Status: DISCONTINUED | OUTPATIENT
Start: 2024-06-20 | End: 2024-06-19 | Stop reason: HOSPADM

## 2024-06-18 RX ORDER — WARFARIN SODIUM 2.5 MG/1
2.5 TABLET ORAL
Status: DISCONTINUED | OUTPATIENT
Start: 2024-06-19 | End: 2024-06-19 | Stop reason: HOSPADM

## 2024-06-18 RX ORDER — SODIUM CHLORIDE 9 MG/ML
INJECTION, SOLUTION INTRAVENOUS PRN
Status: DISCONTINUED | OUTPATIENT
Start: 2024-06-18 | End: 2024-06-19 | Stop reason: HOSPADM

## 2024-06-18 RX ADMIN — DILTIAZEM HYDROCHLORIDE 10 MG: 5 INJECTION, SOLUTION INTRAVENOUS at 10:26

## 2024-06-18 RX ADMIN — SODIUM CHLORIDE 1000 ML: 9 INJECTION, SOLUTION INTRAVENOUS at 10:17

## 2024-06-18 RX ADMIN — METOPROLOL TARTRATE 25 MG: 25 TABLET, FILM COATED ORAL at 18:42

## 2024-06-18 RX ADMIN — SODIUM CHLORIDE: 9 INJECTION, SOLUTION INTRAVENOUS at 18:44

## 2024-06-18 RX ADMIN — SODIUM CHLORIDE, PRESERVATIVE FREE 10 ML: 5 INJECTION INTRAVENOUS at 20:32

## 2024-06-18 RX ADMIN — DILTIAZEM HYDROCHLORIDE 5 MG/HR: 5 INJECTION, SOLUTION INTRAVENOUS at 20:26

## 2024-06-18 RX ADMIN — SODIUM CHLORIDE 2.5 MG/HR: 900 INJECTION, SOLUTION INTRAVENOUS at 10:34

## 2024-06-18 RX ADMIN — ONDANSETRON 4 MG: 2 INJECTION INTRAMUSCULAR; INTRAVENOUS at 10:26

## 2024-06-18 ASSESSMENT — LIFESTYLE VARIABLES
HOW MANY STANDARD DRINKS CONTAINING ALCOHOL DO YOU HAVE ON A TYPICAL DAY: PATIENT DOES NOT DRINK
HOW OFTEN DO YOU HAVE A DRINK CONTAINING ALCOHOL: NEVER

## 2024-06-18 ASSESSMENT — PAIN - FUNCTIONAL ASSESSMENT: PAIN_FUNCTIONAL_ASSESSMENT: NONE - DENIES PAIN

## 2024-06-18 NOTE — ED PROVIDER NOTES
[facility] EMERGENCY DEPARTMENT     EMERGENCY DEPARTMENT ENCOUNTER            Pt Name: Gabriel Maya   MRN: [id number]   Birthdate 1980   Date of evaluation: 6/18/2024   Provider: Hamzah Beyer II, DO   PCP: Joe Perla DO   Note Started: 10:27 AM EDT 6/18/24          CHIEF COMPLAINT     Chief Complaint   Patient presents with    Emesis             HISTORY OF PRESENT ILLNESS:   History from : Patient   Limitations to history : None     Gabriel Maya is a 43 y.o. male who presents to the emergency department complaining of \"A-fib \"with nausea and vomiting.  Patient states that he was diagnosed with atrial fibrillation several months ago and has been consistent with his medications.  He states that he was within normal limits until midmorning when he walked to the bus stop and started feeling nausea.  Patient states that he then had 1 episode of vomiting/diarrhea but has continued with tachycardia since that time.  Patient reports similar vomiting/diarrhea episode at onset of previous A-fib episodes.  No chest pain or shortness of breath.  Patient is sweaty but was also outside after walking to the bus stop(approximately 90 degrees outside).  Patient is reportedly on amiodarone, metoprolol, and warfarin.    Nursing Notes were all reviewed and agreed with, or any disagreements were addressed in the HPI.     REVIEW OF SYSTEMS :    Positives and Pertinent negatives as per HPI.      MEDICAL HISTORY   has a past medical history of Atrial fibrillation (HCC), History of bipolar disorder & PTSD (07/27/2021), Hypertension, and Mixed hyperlipidemia (07/29/2021).    Past Surgical History:   Procedure Laterality Date    INGUINAL HERNIA REPAIR Bilateral     WISDOM TOOTH EXTRACTION        CURRENTMEDICATIONS       Previous Medications    AMIODARONE (CORDARONE) 200 MG TABLET    TAKE 1 TABLET BY MOUTH DAILY    FUROSEMIDE (LASIX) 40 MG TABLET    Take 1 tablet by mouth daily    HYOSCYAMINE SULFATE

## 2024-06-18 NOTE — PLAN OF CARE
4 Eyes Skin Assessment     NAME:  Gabriel Maya  YOB: 1980  MEDICAL RECORD NUMBER:  [id number]    The patient is being assessed for  Admission    I agree that at least one RN has performed a thorough Head to Toe Skin Assessment on the patient. ALL assessment sites listed below have been assessed.      Areas assessed by both nurses:    Head, Face, Ears, Shoulders, Back, Chest, Arms, Elbows, Hands, Sacrum. Buttock, Coccyx, Ischium, and Legs. Feet and Heels        Does the Patient have a Wound? No noted wound(s)       Samy Prevention initiated by RN: No  Wound Care Orders initiated by RN: No    Pressure Injury (Stage 3,4, Unstageable, DTI, NWPT, and Complex wounds) if present, place Wound referral order by RN under : No    New Ostomies, if present place, Ostomy referral order under : No     Nurse 1 eSignature: Electronically signed by Nessa Prasad RN on 6/18/24 at 6:31 PM EDT    **SHARE this note so that the co-signing nurse can place an eSignature**    Nurse 2 eSignature: {Esignature:[id number]}

## 2024-06-18 NOTE — H&P
meds          Disposition:   Current Living situation: Home  Expected Disposition: home  Estimated D/C: 1-2 days    Diet No diet orders on file   DVT Prophylaxis [] Lovenox, []  Heparin, [] SCDs, [] Ambulation,  [] Eliquis, [] Xarelto, [] Coumadin   Code Status Prior   Surrogate Decision Maker/ POA wife     Personally reviewed Lab Studies and Imaging     Discussed management of the case with ED who recommended admission    EKG interpreted personally and results Aflutter with RVR      History from:     patient    History of Present Illness:     Chief Complaint: Aflutter with RVR  Gabriel Maya is a 43 y.o. male with pmh of Pafib on amiodarone, metoprolol and wafarin, Bipolar disorder, PTSD, HTN and HLD who was transferred here for Aflutter with RVR    Pt was diagnosed with Afib 3 months ago. At that time he had cardioversion (3 times). He was started on amiodarone and metoprolol. He was found with acute HFrEF as well (EF 20-25%). He had an angiogram that showed patent coronaries . He has been compliant with his medications     He was doing well since then. Last night he had a TV dinner that was a bit cold. This morning he was feeling unwell but no specific symptoms. He was walk to the bus stop where he started to have nausea. This was followed by vomiting and diarrhea there so he called EMS. He felt he was not able to breath well He denies any CP, fever, chills, cough, abd pain or urinary symptoms.    He was found in Aflutter with RVR and was started on cardizem drip but improved and it was stopped before transfer here. Labs showed Cr 1.4 which slightly higher than before . WBC 14.8. INR 2.36.    He does not smoke or uses drugs. He quit alcohol after last admission. He drinks 2L pepsi a day     Review of Systems:        Pertinent positives and negatives discussed in HPI     Objective:   No intake or output data in the 24 hours ending 06/18/24 1744   Vitals:   Vitals:    06/18/24 1259 06/18/24 1356 06/18/24 1431

## 2024-06-18 NOTE — PLAN OF CARE
Patient admitted after an episode of diarrhea and vomiting followed by A fib with RVR. Patient now in atrial fib with a rate in the 80's. No chest pain. No further nausea, vomiting, or diarrhea. Oriented to room. Call light in reach.

## 2024-06-18 NOTE — ED NOTES
Patient to ed per [facility] with complaints of nausea, vomiting and diarrhea which started this am while at the bus stop, patient is in Atrial Fib.

## 2024-06-18 NOTE — ED NOTES
Patient to transfer to room # 1748 attempt to call report Flynn Mariee will have nursing charge rn to return call for report.

## 2024-06-19 VITALS
RESPIRATION RATE: 18 BRPM | BODY MASS INDEX: 44.4 KG/M2 | SYSTOLIC BLOOD PRESSURE: 143 MMHG | HEART RATE: 95 BPM | TEMPERATURE: 98.3 F | WEIGHT: 299.8 LBS | OXYGEN SATURATION: 95 % | HEIGHT: 69 IN | DIASTOLIC BLOOD PRESSURE: 90 MMHG

## 2024-06-19 PROBLEM — I48.92 ATRIAL FLUTTER WITH RAPID VENTRICULAR RESPONSE (HCC): Status: ACTIVE | Noted: 2024-06-19

## 2024-06-19 LAB
ALBUMIN SERPL-MCNC: 3.8 G/DL (ref 3.4–5)
ANION GAP SERPL CALCULATED.3IONS-SCNC: 9 MMOL/L (ref 3–16)
BASOPHILS # BLD: 0.1 K/UL (ref 0–0.2)
BASOPHILS NFR BLD: 1 %
BUN SERPL-MCNC: 16 MG/DL (ref 7–20)
CALCIUM SERPL-MCNC: 9.1 MG/DL (ref 8.3–10.6)
CHLORIDE SERPL-SCNC: 106 MMOL/L (ref 99–110)
CO2 SERPL-SCNC: 26 MMOL/L (ref 21–32)
CREAT SERPL-MCNC: 1.1 MG/DL (ref 0.9–1.3)
DEPRECATED RDW RBC AUTO: 15.7 % (ref 12.4–15.4)
EKG ATRIAL RATE: 375 BPM
EKG DIAGNOSIS: NORMAL
EKG Q-T INTERVAL: 374 MS
EKG QRS DURATION: 92 MS
EKG QTC CALCULATION (BAZETT): 508 MS
EKG R AXIS: -39 DEGREES
EKG T AXIS: 194 DEGREES
EKG VENTRICULAR RATE: 111 BPM
EOSINOPHIL # BLD: 0.1 K/UL (ref 0–0.6)
EOSINOPHIL NFR BLD: 1.6 %
GFR SERPLBLD CREATININE-BSD FMLA CKD-EPI: 84 ML/MIN/{1.73_M2}
GLUCOSE SERPL-MCNC: 97 MG/DL (ref 70–99)
HCT VFR BLD AUTO: 45.6 % (ref 40.5–52.5)
HGB BLD-MCNC: 15.2 G/DL (ref 13.5–17.5)
INR PPP: 2.77 (ref 0.85–1.15)
LYMPHOCYTES # BLD: 1.9 K/UL (ref 1–5.1)
LYMPHOCYTES NFR BLD: 22 %
MCH RBC QN AUTO: 29.8 PG (ref 26–34)
MCHC RBC AUTO-ENTMCNC: 33.3 G/DL (ref 31–36)
MCV RBC AUTO: 89.5 FL (ref 80–100)
MONOCYTES # BLD: 0.9 K/UL (ref 0–1.3)
MONOCYTES NFR BLD: 10.5 %
NEUTROPHILS # BLD: 5.6 K/UL (ref 1.7–7.7)
NEUTROPHILS NFR BLD: 64.9 %
PHOSPHATE SERPL-MCNC: 3 MG/DL (ref 2.5–4.9)
PLATELET # BLD AUTO: 209 K/UL (ref 135–450)
PMV BLD AUTO: 10 FL (ref 5–10.5)
POTASSIUM SERPL-SCNC: 4 MMOL/L (ref 3.5–5.1)
PROTHROMBIN TIME: 29.2 SEC (ref 11.9–14.9)
RBC # BLD AUTO: 5.09 M/UL (ref 4.2–5.9)
SODIUM SERPL-SCNC: 141 MMOL/L (ref 136–145)
WBC # BLD AUTO: 8.7 K/UL (ref 4–11)

## 2024-06-19 PROCEDURE — 99254 IP/OBS CNSLTJ NEW/EST MOD 60: CPT | Performed by: INTERNAL MEDICINE

## 2024-06-19 PROCEDURE — 6370000000 HC RX 637 (ALT 250 FOR IP): Performed by: HOSPITALIST

## 2024-06-19 PROCEDURE — 36415 COLL VENOUS BLD VENIPUNCTURE: CPT

## 2024-06-19 PROCEDURE — 80069 RENAL FUNCTION PANEL: CPT

## 2024-06-19 PROCEDURE — 85610 PROTHROMBIN TIME: CPT

## 2024-06-19 PROCEDURE — 2580000003 HC RX 258: Performed by: HOSPITALIST

## 2024-06-19 PROCEDURE — 93010 ELECTROCARDIOGRAM REPORT: CPT | Performed by: INTERNAL MEDICINE

## 2024-06-19 PROCEDURE — 85025 COMPLETE CBC W/AUTO DIFF WBC: CPT

## 2024-06-19 RX ORDER — METOPROLOL SUCCINATE 100 MG/1
100 TABLET, EXTENDED RELEASE ORAL DAILY
Status: DISCONTINUED | OUTPATIENT
Start: 2024-06-19 | End: 2024-06-19

## 2024-06-19 RX ORDER — METOPROLOL SUCCINATE 50 MG/1
150 TABLET, EXTENDED RELEASE ORAL DAILY
Qty: 90 TABLET | Refills: 3 | Status: SHIPPED | OUTPATIENT
Start: 2024-06-20

## 2024-06-19 RX ADMIN — AMIODARONE HYDROCHLORIDE 200 MG: 200 TABLET ORAL at 09:04

## 2024-06-19 RX ADMIN — METOPROLOL TARTRATE 25 MG: 25 TABLET, FILM COATED ORAL at 00:43

## 2024-06-19 RX ADMIN — SODIUM CHLORIDE, PRESERVATIVE FREE 10 ML: 5 INJECTION INTRAVENOUS at 09:04

## 2024-06-19 RX ADMIN — METOPROLOL SUCCINATE 150 MG: 100 TABLET, EXTENDED RELEASE ORAL at 11:34

## 2024-06-19 RX ADMIN — METOPROLOL TARTRATE 25 MG: 25 TABLET, FILM COATED ORAL at 06:45

## 2024-06-19 NOTE — PROGRESS NOTES
Pharmacy  Note  - Admission Medication History    List of [id number] medications is complete. I reviewed Rx fill history via \"Complete Dispense Report\" in Epic, and spoke to patient in person.      The following changes made to syqcs-da-[id number] medication list:    ADDED:   1) None    Dose or Frequency CHANGE:  1) Metoprolol succinate 100 mg, patient takes 100 mg tablet once daily instead of twice daily as prescribed.   2) Furosemide 40 mg, patient reported of taking Lasix 40 mg tablet twice daily instructed by his doctor recently.   3) Warfarin, patient takes 2.5 mg once daily on M/W/F, and takes 5 mg once daily all other days.     REMOVED:  1) Hyoscyamine 0.125      Current Outpatient Medications   Medication Instructions    amiodarone (CORDARONE) 200 mg, Oral, DAILY    furosemide (LASIX) 40 mg, Oral, DAILY    losartan (COZAAR) 25 mg, Oral, DAILY    metoprolol succinate (TOPROL XL) 100 mg, Oral, 2 TIMES DAILY    spironolactone (ALDACTONE) 25 mg, Oral, DAILY    warfarin (COUMADIN) 5 mg, Oral, DAILY     Roxysinharsh (Iris) Alex  PharmD Candidate 2026

## 2024-06-19 NOTE — PROGRESS NOTES
[facility] -  Clinical Pharmacy Note    Warfarin - Management by Pharmacy    Consult Date(s): 6/8/24  Consulting Provider(s): Dr Saldana    Assessment / Plan  A Fib- Warfarin  Goal INR: 2 - 3  Concurrent Anticoagulants / Antiplatelets: none  Interactions:   Amiodarone - home medication; do not anticipate acute interaction  Current Regimen / Plan:   INR remains therapeutic (2.77)  Will continue home dose of Warfarin 5mg daily except for 2.5mg on Mon-Wed-Fri  Due for warfarin 2.5mg tonight  Will monitor pt's clinical status and INR daily, and make dose adjustments as needed.    Discharge recommendations--  If patient discharged today, would recommend to continue home dose of warfarin 5mg daily except for 2.5mg MWF.  Pt has appointment scheduled for INR check on 6/27/24.    Please call with questions--  Marily Hugo, PharmD, [facility]  Wireless: [phone]   6/19/2024 11:02 AM          Interval update: Diltiazem gtt now d/c'd.     Subjective/Objective:   Gabriel Maya is a 43 y.o. male with a PMHx significant for AFib on warfarin, bipolar disorder, PTSD, HTN and HLD. Admitted 6/18 with AFib/ RVR  in setting of nausea/vomiting.     Pharmacy is consulted to dose warfarin.    Ht Readings from Last 1 Encounters:   06/18/24 1.753 m (5' 9\")     Wt Readings from Last 1 Encounters:   06/19/24 136 kg (299 lb 12.8 oz)     Prior / Home Warfarin Regimen:  Goal INR 2-3  Managed by [facility]  Last INR check was 6/13 - INR 4.1  INR 4.1 - pt was instructed to hold x 1 dose then decrease to 5mg daily except for 2.5mg Mon-Wed-Fri  (was previously on 5mg daily except for 2.5mg Mon+Fri)  Next INR check scheduled for 6/27/24    Date INR Warfarin   6/18 2.36 5 mg (at home, per pt report)   6/19 2.77                  Recent Labs     06/18/24  1005 06/19/24  0443   INR 2.36* 2.77*   HGB 16.8  --      --    CREATININE 1.4*  --

## 2024-06-19 NOTE — DISCHARGE INSTRUCTIONS
Extra Heart Failure Education/ Tools/ Resources:     https://Curse.com/publication/?e=031542   --- this is American Heart Association interactive Healthier Living with Heart Failure guidebook.  Please click hyperlink or copy / paste link into search bar. The QR Code is also available below. Use your mouse to scroll through the pages.  Lots of information about weight monitoring, diet tips, activity, meds, etc    Heart Failure Tools and Resources QR Code is below. It includes multiple resources to include symptom tracker, med tracker, further HF info, and access to a HF Support Network online Community    HF [place]  -- this is a free smart phone juana available for iPhone and Android download.  Use your phone to track sodium / fluid intake, zone tool symptom tracking, weights, medications, etc. Click on this hyperlink  HF [place]   for QR code for easy download or the link is also found in the below HF Tools and Resources.      DASH (Dietary Approach to Stop Hypertension) diet --  https://www.nhlbi.nih.gov/education/dash-eating-plan -- this diet is a flexible eating plan that promotes heart healthy eating style.  Click on hyperlink or copy / paste link into search bar.  Lots of low sodium recipes and tips.    https://www.[employer].DGIT/recipes  -- more free recipes

## 2024-06-19 NOTE — CARE COORDINATION
Case Management Assessment  Initial Evaluation    Date/Time of Evaluation: 6/19/2024 2:09 PM  Assessment Completed by: Colin Mcpherson RN    If patient is discharged prior to next notation, then this note serves as note for discharge by case management.    Patient Name: Gabriel Maya                   YOB: 1980  Diagnosis: Atrial fibrillation with rapid ventricular response (HCC) [I48.91]  Atrial flutter with rapid ventricular response (HCC) [I48.92]  Nausea vomiting and diarrhea [R11.2, R19.7]  Hx of atrial flutter [Z86.79]                   Date / Time: 6/18/2024  9:47 AM    Patient Admission Status: Inpatient   Readmission Risk (Low < 19, Mod (19-27), High > 27): Readmission Risk Score: 14.6    Current PCP: Joe Perla DO  PCP verified by CM? Yes (Joe Peral DO)    Chart Reviewed: Yes      History Provided by: Patient  Patient Orientation: Alert and Oriented, Person, Place, Situation    Patient Cognition: Alert    Hospitalization in the last 30 days (Readmission):  No    If yes, Readmission Assessment in CM Navigator will be completed.       Advance Directives:    Code Status: Full Code   [place] DNR form completed and on chart: No, Not Indicated    Patient's Primary Decision Maker is: Legal Next of Kin    Copy present: No     Discharge Planning:  Patient lives with: Alone  Type of Home: Apartment  Primary Care Giver: Self  Patient Support Systems include: Family Members     Current Financial resources: Medicare    Current community resources: None    Currently ACTIVE with [place] on Aging: No  Services: Not Applicable  :  Phone:     Current services prior to admission: None            Current DME:              Type of Home Care services:  None    Home Care Information  Currently ACTIVE with Home Health Care: No  Home Care Agency: Not Applicable    ADLS  Prior functional level: Independent in ADLs/IADLs  Current functional level: Independent in

## 2024-06-19 NOTE — PROGRESS NOTES
D/C order noted. SL and tele removed. Pt verbalized understanding of new medications and follow up. Pt states he will come tomorrow to  metoprolol from outpt pharmacy because he didn't have the money today. RN expressed importance of picking up meds tomorrow he stated his mom would bring him to pick them up. Pt ambulated off unit per his choice with family.

## 2024-06-19 NOTE — CONSULTS
[facility] -  Clinical Pharmacy Note    Warfarin - Management by Pharmacy    Consult Date(s): 6/8/24  Consulting Provider(s): Dr Saldana    Assessment / Plan  A Fib- Warfarin  Goal INR: 2 - 3  Concurrent Anticoagulants / Antiplatelets: none  Interactions: No significant interactions noted.  Current Regimen / Plan:   INR today = 2.36 (per pt, he took 5 mg dose at home this am  Starting tomorrow, resume home regimen of 2.5 mg MWF and 5 mg all other days  Will monitor pt's clinical status and INR daily, and make dose adjustments as needed.    Thank you for consulting pharmacy,    Please call with any questions    Latisha Roque  Pharm.D. [phone] (Main Pharmacy)        Interval update:  therapy initiation     Subjective/Objective:   Gabriel Maya is a 43 y.o. male with a PMHx significant for AFib on warfarin, bipolar disorder, PTSD, HTN and HLD. Admitted 6/18 with AFib/ RVR      Pharmacy is consulted to dose warfarin.    Ht Readings from Last 1 Encounters:   06/18/24 1.753 m (5' 9\")     Wt Readings from Last 1 Encounters:   06/18/24 136.1 kg (300 lb)     Prior / Home Warfarin Regimen:  2.5 mg on MWF, 5 mg all other days  Managed at [facility]agulation clinic  Last visit 6/13-- INR was 4.1 on prior regimen of 2.5mg Monday+ Friday and 5 mg all other days-- instructed to hold x1 then decrease to regimen above    Date INR Warfarin   6/18 2.36                       Recent Labs     06/18/24  1005   INR 2.36*   HGB 16.8      CREATININE 1.4*      
since asx could follow up with Dr Torres and EP as OP.

## 2024-06-19 NOTE — PLAN OF CARE
Problem: Discharge Planning  Goal: Discharge to home or other facility with appropriate resources  Outcome: Progressing  Flowsheets (Taken 6/18/2024 1917)  Discharge to home or other facility with appropriate resources: Identify barriers to discharge with patient and caregiver     Problem: Chronic Conditions and Co-morbidities  Goal: Patient's chronic conditions and co-morbidity symptoms are monitored and maintained or improved  Outcome: Progressing     Problem: Cardiovascular - Adult  Goal: Maintains optimal cardiac output and hemodynamic stability  Recent Flowsheet Documentation  Taken 6/18/2024 1917 by Liza Rodriguez, RN  Maintains optimal cardiac output and hemodynamic stability:   Monitor blood pressure and heart rate   Administer fluid and/or volume expanders as ordered  PLAN of acre on-going

## 2024-06-19 NOTE — DISCHARGE SUMMARY
lb 12.8 oz)   SpO2 95%   BMI 44.27 kg/m²       Physical Exam:     General: NAD  Eyes: EOMI  ENT: neck supple  Cardiovascular: irregular rate.  Respiratory: Clear to auscultation  Gastrointestinal: Soft, non tender  Genitourinary: no suprapubic tenderness  Musculoskeletal: No edema  Skin: warm, dry  Neuro: Alert.  Psych: Mood appropriate.         Labs and Imaging   XR CHEST PORTABLE    Result Date: 6/18/2024  XR CHEST PORTABLE Indication: tachycardic COMPARISON: March 23, 2024 Findings: Frontal view of the chest was obtained. The heart is normal in size and configuration. The lungs are clear. There is no effusion or pneumothorax     No acute abnormality. Electronically signed by Miguelangel Huber      CBC:   Recent Labs     06/18/24  1005 06/19/24  1314   WBC 14.8* 8.7   HGB 16.8 15.2    209     BMP:    Recent Labs     06/18/24  1005 06/19/24  1314    141   K 3.8 4.0    106   CO2 19* 26   BUN 16 16   CREATININE 1.4* 1.1   GLUCOSE 197* 97     Hepatic:   Recent Labs     06/18/24  1005   AST 33   ALT 24   BILITOT 0.8   ALKPHOS 67     Lipids:   Lab Results   Component Value Date/Time    HDL 44 07/28/2021 02:35 PM     Hemoglobin A1C:   Lab Results   Component Value Date/Time    LABA1C 5.2 07/28/2021 02:35 PM     TSH:   Lab Results   Component Value Date/Time    TSH 4.70 03/27/2024 07:21 AM     Troponin: No results found for: \"TROPONINT\"  Lactic Acid: No results for input(s): \"LACTA\" in the last 72 hours.  BNP: No results for input(s): \"PROBNP\" in the last 72 hours.  UA:  Lab Results   Component Value Date/Time    NITRU Negative 03/23/2024 07:36 PM    COLORU Yellow 03/23/2024 07:36 PM    PHUR 5.5 03/23/2024 07:36 PM    WBCUA 3-5 03/23/2024 07:36 PM    RBCUA 0-2 03/23/2024 07:36 PM    MUCUS 3+ 03/23/2024 07:36 PM    BACTERIA 2+ 03/23/2024 07:36 PM    CLARITYU Clear 03/23/2024 07:36 PM    LEUKOCYTESUR Negative 03/23/2024 07:36 PM    UROBILINOGEN 0.2 03/23/2024 07:36 PM    BILIRUBINUR Negative 03/23/2024

## 2024-06-27 ENCOUNTER — ANTI-COAG VISIT (OUTPATIENT)
Dept: PHARMACY | Age: 44
End: 2024-06-27

## 2024-06-27 DIAGNOSIS — I50.21 ACUTE SYSTOLIC CHF (CONGESTIVE HEART FAILURE), NYHA CLASS 2 (HCC): ICD-10-CM

## 2024-06-27 DIAGNOSIS — I48.91 ATRIAL FIBRILLATION WITH RAPID VENTRICULAR RESPONSE (HCC): Primary | ICD-10-CM

## 2024-06-27 LAB
INTERNATIONAL NORMALIZATION RATIO, POC: 2.2
PROTHROMBIN TIME, POC: NORMAL

## 2024-06-27 PROCEDURE — 85610 PROTHROMBIN TIME: CPT

## 2024-06-27 PROCEDURE — 99211 OFF/OP EST MAY X REQ PHY/QHP: CPT

## 2024-06-27 NOTE — PROGRESS NOTES
admission for a.fib with RVR   Recent medication changes 3/29: started on xarelto  5/17: Xarelto stopped, changed to warfarin  6/18: metoprolol dose increase   Medications taken regularly that may interact with warfarin or alter INR amiodarone   Warfarin dose taken as prescribed No - Uses pillbox   Signs/symptoms of bleeding No -  H/o bleeding   Vitamin K intake Normally has ~1 servings of green, leafy vegetables per day   Recent vomiting/diarrhea/fever, changes in weight or activity level None reported   Tobacco or alcohol use Patient reports no smoking   Patient reports having occasional drink - social   Upcoming surgeries or procedures None reported     Assessment/Plan:  Patient's INR was therapeutic today (2.2). Patient confirms warfarin dose and denies any missed doses or changes to diet, lifestyle, or bleeding. Patient was admitted to [facility] for a.fib with rvr 6/18-19. Only medication changes are an increase in metoprolol dose. Patient works at [employer] and reports eating a salad daily but states mostly iceberg lettuce. Patient wife lives in [place] at this time but will hopefully be back in [place] early next year - patient travels to see her regularly.     Patient was instructed to continue dose of warfarin 2.5 mg Mondays, Wednesdays, and Fridays and 5 mg all other days.  Recheck INR in 4 weeks. Patient was reminded to maintain consistent vitamin K intake and call with any bleeding, medication changes, or fever/vomiting/diarrhea.    Patient understands dosing directions and information discussed. Dosing schedule and follow up appointment given to patient. Progress note routed to referring physician's office. Patient acknowledges working in consult agreement with pharmacist as referred by his/her physician.     Next Clinic Appointment:  7/25     Please call [facility] at [phone] with any questions.   Please call [facility] at [phone] with any questions.

## 2024-07-25 ENCOUNTER — ANTI-COAG VISIT (OUTPATIENT)
Dept: PHARMACY | Age: 44
End: 2024-07-25

## 2024-07-25 DIAGNOSIS — I48.91 ATRIAL FIBRILLATION WITH RAPID VENTRICULAR RESPONSE (HCC): Primary | ICD-10-CM

## 2024-07-25 DIAGNOSIS — I50.21 ACUTE SYSTOLIC CHF (CONGESTIVE HEART FAILURE), NYHA CLASS 2 (HCC): ICD-10-CM

## 2024-07-25 LAB
INTERNATIONAL NORMALIZATION RATIO, POC: 3.7
PROTHROMBIN TIME, POC: NORMAL

## 2024-07-25 PROCEDURE — 85610 PROTHROMBIN TIME: CPT | Performed by: PHARMACIST

## 2024-07-25 PROCEDURE — 99211 OFF/OP EST MAY X REQ PHY/QHP: CPT | Performed by: PHARMACIST

## 2024-07-25 NOTE — PROGRESS NOTES
ANTICOAGULATION SERVICE    Gabriel Maya is a 44 y.o. male with PMHx significant for CHF, Afib, NEFTALI who presents to clinic 7/25/2024 for anticoagulation monitoring and adjustment. Patient was switched from Xarelto to warfarin on 5/17/24 due to lack of insurance.     Anticoagulation Indication(s):  Afib, CHF     Referring Physician:   Dr. Aldair Torres  Goal INR Range:  2-3  Duration of Anticoagulation Therapy:  indefinite  Time of day dose taken: AM  Product patient has at home: warfarin 5 mg (peach color)    [id number] Score for Atrial Fibrillation Stroke Risk   Risk   Factors  Component Value   C CHF Yes 1   H HTN No 0   A2 Age >= 75 No,  (44 y.o.) 0   D DM No 0   S2 Prior Stroke/TIA No 0   V Vascular Disease No 0   A Age 65-74 No,  (44 y.o.) 0   Sc Sex male 0    [id number]  Score  1   Score last updated 6/13/24 10:23 AM EDT    Click here for a link to the UpToDate guideline \"Atrial Fibrillation: Anticoagulation therapy to prevent embolization    Disclaimer: Risk Score calculation is dependent on accuracy of patient problem list and past encounter diagnosis.      Pertinent labs:  Lab Results   Component Value Date    INR 2.2 06/27/2024    INR 2.77 (H) 06/19/2024    INR 2.36 (H) 06/18/2024    INR 4.1 06/13/2024         INR Summary                           Warfarin regimen (mg)  Date INR   A/P   Sun Mon Tue Wed Thu Fri Sat Mg/wk  7/25 3.7 Above goal, hold 5 2.5 5 2.5 0/5 2.5 5 27.5  6/27 2.2 At goal, continue 5 2.5 5 2.5 5 2.5 5 27.5  6/13 4.1 Above goal, hold+dec 5 2.5 5 2.5 0/5 2.5 5 27.5  5/29 4.7 Above goal, hold+dec 5 2.5 5 0/5 0/5 2.5 5 30  5/17 Started warfarin  5 5 5 5 5 5 5 35    Last CBC:  Lab Results   Component Value Date    RBC 5.09 06/19/2024    HGB 15.2 06/19/2024    HCT 45.6 06/19/2024    MCV 89.5 06/19/2024    MCH 29.8 06/19/2024    MPV 10.0 06/19/2024    RDW 15.7 (H) 06/19/2024     06/19/2024       Patient History:  Recent hospitalizations/HC visits 3/24-29: new onset a.fib and

## 2024-08-02 ENCOUNTER — OFFICE VISIT (OUTPATIENT)
Dept: CARDIOLOGY CLINIC | Age: 44
End: 2024-08-02

## 2024-08-02 VITALS
WEIGHT: 308.4 LBS | BODY MASS INDEX: 45.54 KG/M2 | SYSTOLIC BLOOD PRESSURE: 120 MMHG | DIASTOLIC BLOOD PRESSURE: 80 MMHG | HEART RATE: 57 BPM

## 2024-08-02 DIAGNOSIS — I50.22 CHRONIC SYSTOLIC CONGESTIVE HEART FAILURE (HCC): Primary | ICD-10-CM

## 2024-08-02 PROCEDURE — 99214 OFFICE O/P EST MOD 30 MIN: CPT | Performed by: INTERNAL MEDICINE

## 2024-08-02 RX ORDER — FUROSEMIDE 40 MG/1
40 TABLET ORAL 2 TIMES DAILY
Qty: 60 TABLET | Refills: 5 | Status: SHIPPED | OUTPATIENT
Start: 2024-08-02

## 2024-08-02 NOTE — PROGRESS NOTES
Cc: PAFRVR, HFrEF due to NICMO    HPI:     Gabriel Maya is a 44 y.o. overweight male with history of likely NEFTALI untreated, PAFRVR status post DC cardioversion times 3/26 and 3/28/2024, both of them failed, now persistent A-fib with controlled ventricular response, new HFrEF due to NICMO, bipolar disorder, PTSD. 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     Echo 3/25/2024: Normal LV size and wall thickness, LVEF 20-25%, indeterminate diastolic function due to A-fib, no LV apical clot, normal LA size, normal RV size with mild dysfunction, mild MR/TR.      ECG 3/23/24 consistent with AF RVR, 173 bpm, no ischemic changes.    LHC 3/29/2024: Normal coronaries, LVEDP 12 mmHg.    03/2024 TSH normal, free T3 and free T4 normal    Patient returns to the clinic today for follow-up.  Patient reports no concerning symptoms.  He is compliant with his medications.      Histories     Past Medical History:   has a past medical history of Atrial fibrillation (HCC), History of bipolar disorder & PTSD, Hypertension, and Mixed hyperlipidemia.    Surgical History:   has a past surgical history that includes [place] tooth extraction and Inguinal hernia repair (Bilateral).     Social History:   reports that he has never smoked. He has never used smokeless tobacco. He reports that he does not currently use alcohol. He reports that he does not use drugs.     Family History:  No evidence for sudden cardiac death or premature CAD      Medications:     Home medications were reviewed and are listed below    Prior to Admission medications    Medication Sig Start Date End Date Taking? Authorizing Provider   furosemide (LASIX) 40 MG tablet Take 1 tablet by mouth 2 times daily 8/2/24  Yes Aldair Torres MD   metoprolol succinate (TOPROL XL) 50 MG extended release tablet Take 3 tablets by mouth daily 6/20/24  Yes Zurdo Saldana MD   amiodarone (CORDARONE) 200 MG

## 2024-08-05 ENCOUNTER — HOSPITAL ENCOUNTER (OUTPATIENT)
Dept: SLEEP CENTER | Age: 44
Discharge: HOME | End: 2024-08-05

## 2024-08-05 DIAGNOSIS — R06.83 SNORING: ICD-10-CM

## 2024-08-05 DIAGNOSIS — G47.10 HYPERSOMNIA: ICD-10-CM

## 2024-08-09 ENCOUNTER — TELEPHONE (OUTPATIENT)
Dept: SLEEP CENTER | Age: 44
End: 2024-08-09

## 2024-08-09 NOTE — TELEPHONE ENCOUNTER
Returned pt's call to reschedule sleep study     Missed the prior one -he forgot about it.     Left vm for the pt to call us back

## 2024-08-12 ENCOUNTER — TELEPHONE (OUTPATIENT)
Dept: CARDIOLOGY CLINIC | Age: 44
End: 2024-08-12

## 2024-08-12 RX ORDER — SPIRONOLACTONE 25 MG/1
25 TABLET ORAL DAILY
Qty: 90 TABLET | Refills: 3 | Status: SHIPPED | OUTPATIENT
Start: 2024-08-12

## 2024-08-12 NOTE — TELEPHONE ENCOUNTER
Requested Prescriptions     Pending Prescriptions Disp Refills    spironolactone (ALDACTONE) 25 MG tablet [Pharmacy Med Name: SPIRONOLACTONE 25 MG TABLET] 90 tablet      Sig: TAKE 1 TABLET BY MOUTH DAILY            Checked Correct Pharmacy: Yes    Any changes since last refill? No       Last Office Visit: 8/2/2024     Next Office Visit: 11/7/2024

## 2024-08-14 ENCOUNTER — TELEPHONE (OUTPATIENT)
Dept: CARDIOLOGY CLINIC | Age: 44
End: 2024-08-14

## 2024-08-14 NOTE — TELEPHONE ENCOUNTER
Spoke w/dr rodriguez, aware pt unable to pay for data downloads from [employer]; will dc life vest. [employer] aware.

## 2024-08-15 ENCOUNTER — TELEPHONE (OUTPATIENT)
Dept: PHARMACY | Age: 44
End: 2024-08-15

## 2024-08-22 ENCOUNTER — ANTI-COAG VISIT (OUTPATIENT)
Dept: PHARMACY | Age: 44
End: 2024-08-22

## 2024-08-22 DIAGNOSIS — I48.91 ATRIAL FIBRILLATION WITH RAPID VENTRICULAR RESPONSE (HCC): Primary | ICD-10-CM

## 2024-08-22 DIAGNOSIS — I50.21 ACUTE SYSTOLIC CHF (CONGESTIVE HEART FAILURE), NYHA CLASS 2 (HCC): ICD-10-CM

## 2024-08-22 LAB
INTERNATIONAL NORMALIZATION RATIO, POC: 2.3
PROTHROMBIN TIME, POC: 0

## 2024-08-22 PROCEDURE — 99211 OFF/OP EST MAY X REQ PHY/QHP: CPT | Performed by: PHARMACIST

## 2024-08-22 PROCEDURE — 85610 PROTHROMBIN TIME: CPT | Performed by: PHARMACIST

## 2024-08-22 NOTE — PROGRESS NOTES
ANTICOAGULATION SERVICE    Gabriel Maya is a 44 y.o. male with PMHx significant for CHF, Afib, NEFTALI who presents to clinic 8/22/2024 for anticoagulation monitoring and adjustment. Patient was switched from Xarelto to warfarin on 5/17/24 due to lack of insurance.     Anticoagulation Indication(s):  Afib, CHF     Referring Physician:   Dr. Aldair Torres  Goal INR Range:  2-3  Duration of Anticoagulation Therapy:  indefinite  Time of day dose taken: AM  Product patient has at home: warfarin 5 mg (peach color)    [id number] Score for Atrial Fibrillation Stroke Risk   Risk   Factors  Component Value   C CHF Yes 1   H HTN No 0   A2 Age >= 75 No,  (44 y.o.) 0   D DM No 0   S2 Prior Stroke/TIA No 0   V Vascular Disease No 0   A Age 65-74 No,  (44 y.o.) 0   Sc Sex male 0    [id number]  Score  1   Score last updated 6/13/24 10:23 AM EDT    Click here for a link to the UpToDate guideline \"Atrial Fibrillation: Anticoagulation therapy to prevent embolization    Disclaimer: Risk Score calculation is dependent on accuracy of patient problem list and past encounter diagnosis.      Pertinent labs:  Lab Results   Component Value Date    INR 3.7 07/25/2024    INR 2.2 06/27/2024    INR 2.77 (H) 06/19/2024    INR 2.36 (H) 06/18/2024         INR Summary                           Warfarin regimen (mg)  Date INR   A/P   Sun Mon Tue Wed Thu Fri Sat Mg/wk  8/22 2.3 At goal, continue 5 2.5 5 2.5 5 2.5 5 27.5  7/25 3.7 Above goal, hold 5 2.5 5 2.5 0/5 2.5 5 27.5  6/27 2.2 At goal, continue 5 2.5 5 2.5 5 2.5 5 27.5  6/13 4.1 Above goal, hold+dec 5 2.5 5 2.5 0/5 2.5 5 27.5  5/29 4.7 Above goal, hold+dec 5 2.5 5 0/5 0/5 2.5 5 30  5/17 Started warfarin  5 5 5 5 5 5 5 35    Last CBC:  Lab Results   Component Value Date    RBC 5.09 06/19/2024    HGB 15.2 06/19/2024    HCT 45.6 06/19/2024    MCV 89.5 06/19/2024    MCH 29.8 06/19/2024    MPV 10.0 06/19/2024    RDW 15.7 (H) 06/19/2024     06/19/2024       Patient History:  Recent

## 2024-08-26 ENCOUNTER — HOSPITAL ENCOUNTER (OUTPATIENT)
Age: 44
Setting detail: OBSERVATION
Discharge: HOME | End: 2024-08-27
Attending: STUDENT IN AN ORGANIZED HEALTH CARE EDUCATION/TRAINING PROGRAM
Payer: MEDICAID

## 2024-08-26 ENCOUNTER — APPOINTMENT (OUTPATIENT)
Dept: GENERAL RADIOLOGY | Age: 44
End: 2024-08-26
Payer: MEDICAID

## 2024-08-26 DIAGNOSIS — R07.9 CHEST PAIN, UNSPECIFIED TYPE: Primary | ICD-10-CM

## 2024-08-26 DIAGNOSIS — I48.91 ATRIAL FIBRILLATION, UNSPECIFIED TYPE (HCC): ICD-10-CM

## 2024-08-26 LAB
ALBUMIN SERPL-MCNC: 4 G/DL (ref 3.4–5)
ALBUMIN/GLOB SERPL: 1.2 {RATIO} (ref 1.1–2.2)
ALP SERPL-CCNC: 50 U/L (ref 40–129)
ALT SERPL-CCNC: 14 U/L (ref 10–40)
ANION GAP SERPL CALCULATED.3IONS-SCNC: 13 MMOL/L (ref 3–16)
AST SERPL-CCNC: 25 U/L (ref 15–37)
BASOPHILS # BLD: 0.1 K/UL (ref 0–0.2)
BASOPHILS NFR BLD: 1.2 %
BILIRUB SERPL-MCNC: 0.7 MG/DL (ref 0–1)
BUN SERPL-MCNC: 9 MG/DL (ref 7–20)
CALCIUM SERPL-MCNC: 9 MG/DL (ref 8.3–10.6)
CHLORIDE SERPL-SCNC: 103 MMOL/L (ref 99–110)
CO2 SERPL-SCNC: 22 MMOL/L (ref 21–32)
CREAT SERPL-MCNC: 1 MG/DL (ref 0.9–1.3)
DEPRECATED RDW RBC AUTO: 14.4 % (ref 12.4–15.4)
EKG DIAGNOSIS: NORMAL
EKG Q-T INTERVAL: 318 MS
EKG QRS DURATION: 84 MS
EKG QTC CALCULATION (BAZETT): 439 MS
EKG R AXIS: -10 DEGREES
EKG T AXIS: 16 DEGREES
EKG VENTRICULAR RATE: 115 BPM
EOSINOPHIL # BLD: 0 K/UL (ref 0–0.6)
EOSINOPHIL NFR BLD: 0.2 %
GFR SERPLBLD CREATININE-BSD FMLA CKD-EPI: >90 ML/MIN/{1.73_M2}
GLUCOSE SERPL-MCNC: 118 MG/DL (ref 70–99)
HCT VFR BLD AUTO: 43.6 % (ref 40.5–52.5)
HGB BLD-MCNC: 14.6 G/DL (ref 13.5–17.5)
INR PPP: 2.14 (ref 0.85–1.15)
LYMPHOCYTES # BLD: 1.8 K/UL (ref 1–5.1)
LYMPHOCYTES NFR BLD: 15.2 %
MCH RBC QN AUTO: 31.6 PG (ref 26–34)
MCHC RBC AUTO-ENTMCNC: 33.5 G/DL (ref 31–36)
MCV RBC AUTO: 94.4 FL (ref 80–100)
MONOCYTES # BLD: 1 K/UL (ref 0–1.3)
MONOCYTES NFR BLD: 8.2 %
NEUTROPHILS # BLD: 8.8 K/UL (ref 1.7–7.7)
NEUTROPHILS NFR BLD: 75.2 %
NT-PROBNP SERPL-MCNC: 1334 PG/ML (ref 0–124)
PLATELET # BLD AUTO: 233 K/UL (ref 135–450)
PMV BLD AUTO: 10.3 FL (ref 5–10.5)
POTASSIUM SERPL-SCNC: 3.9 MMOL/L (ref 3.5–5.1)
PROT SERPL-MCNC: 7.4 G/DL (ref 6.4–8.2)
PROTHROMBIN TIME: 24 SEC (ref 11.9–14.9)
RBC # BLD AUTO: 4.62 M/UL (ref 4.2–5.9)
SODIUM SERPL-SCNC: 138 MMOL/L (ref 136–145)
TROPONIN, HIGH SENSITIVITY: 7 NG/L (ref 0–22)
WBC # BLD AUTO: 11.7 K/UL (ref 4–11)

## 2024-08-26 PROCEDURE — 71046 X-RAY EXAM CHEST 2 VIEWS: CPT

## 2024-08-26 PROCEDURE — 73030 X-RAY EXAM OF SHOULDER: CPT

## 2024-08-26 PROCEDURE — 80053 COMPREHEN METABOLIC PANEL: CPT

## 2024-08-26 PROCEDURE — G0378 HOSPITAL OBSERVATION PER HR: HCPCS

## 2024-08-26 PROCEDURE — 99285 EMERGENCY DEPT VISIT HI MDM: CPT

## 2024-08-26 PROCEDURE — 83880 ASSAY OF NATRIURETIC PEPTIDE: CPT

## 2024-08-26 PROCEDURE — 93005 ELECTROCARDIOGRAM TRACING: CPT | Performed by: INTERNAL MEDICINE

## 2024-08-26 PROCEDURE — 85025 COMPLETE CBC W/AUTO DIFF WBC: CPT

## 2024-08-26 PROCEDURE — 85610 PROTHROMBIN TIME: CPT

## 2024-08-26 PROCEDURE — 84484 ASSAY OF TROPONIN QUANT: CPT

## 2024-08-26 RX ORDER — FUROSEMIDE 40 MG
40 TABLET ORAL 2 TIMES DAILY
Status: CANCELLED | OUTPATIENT
Start: 2024-08-26

## 2024-08-26 RX ORDER — AMIODARONE HYDROCHLORIDE 200 MG/1
200 TABLET ORAL DAILY
Status: CANCELLED | OUTPATIENT
Start: 2024-08-26

## 2024-08-26 RX ORDER — SODIUM CHLORIDE 0.9 % (FLUSH) 0.9 %
5-40 SYRINGE (ML) INJECTION PRN
Status: DISCONTINUED | OUTPATIENT
Start: 2024-08-26 | End: 2024-08-27 | Stop reason: HOSPADM

## 2024-08-26 RX ORDER — POTASSIUM CHLORIDE 1500 MG/1
40 TABLET, EXTENDED RELEASE ORAL PRN
Status: DISCONTINUED | OUTPATIENT
Start: 2024-08-26 | End: 2024-08-27 | Stop reason: HOSPADM

## 2024-08-26 RX ORDER — POTASSIUM CHLORIDE 7.45 MG/ML
10 INJECTION INTRAVENOUS PRN
Status: DISCONTINUED | OUTPATIENT
Start: 2024-08-26 | End: 2024-08-27 | Stop reason: HOSPADM

## 2024-08-26 RX ORDER — WARFARIN SODIUM 2.5 MG/1
2.5 TABLET ORAL SEE ADMIN INSTRUCTIONS
Status: CANCELLED | OUTPATIENT
Start: 2024-08-26

## 2024-08-26 RX ORDER — SPIRONOLACTONE 25 MG/1
25 TABLET ORAL DAILY
Status: CANCELLED | OUTPATIENT
Start: 2024-08-26

## 2024-08-26 RX ORDER — MAGNESIUM SULFATE IN WATER 40 MG/ML
2000 INJECTION, SOLUTION INTRAVENOUS PRN
Status: DISCONTINUED | OUTPATIENT
Start: 2024-08-26 | End: 2024-08-27 | Stop reason: HOSPADM

## 2024-08-26 RX ORDER — SODIUM CHLORIDE 9 MG/ML
INJECTION, SOLUTION INTRAVENOUS PRN
Status: DISCONTINUED | OUTPATIENT
Start: 2024-08-26 | End: 2024-08-27 | Stop reason: HOSPADM

## 2024-08-26 RX ORDER — ASPIRIN 81 MG/1
81 TABLET, CHEWABLE ORAL ONCE
Status: COMPLETED | OUTPATIENT
Start: 2024-08-27 | End: 2024-08-27

## 2024-08-26 RX ORDER — SODIUM CHLORIDE 0.9 % (FLUSH) 0.9 %
5-40 SYRINGE (ML) INJECTION EVERY 12 HOURS SCHEDULED
Status: DISCONTINUED | OUTPATIENT
Start: 2024-08-26 | End: 2024-08-27 | Stop reason: HOSPADM

## 2024-08-26 RX ORDER — LOSARTAN POTASSIUM 50 MG/1
25 TABLET ORAL DAILY
Status: CANCELLED | OUTPATIENT
Start: 2024-08-26

## 2024-08-26 ASSESSMENT — PAIN DESCRIPTION - LOCATION: LOCATION: ARM;CHEST

## 2024-08-26 ASSESSMENT — ENCOUNTER SYMPTOMS
DIARRHEA: 0
SORE THROAT: 0
EYE DISCHARGE: 0
SINUS PRESSURE: 0
CHEST TIGHTNESS: 0
VOMITING: 0
EYE ITCHING: 0
RHINORRHEA: 0
CHOKING: 0
ABDOMINAL PAIN: 0
SHORTNESS OF BREATH: 1
CONSTIPATION: 0
NAUSEA: 0
BACK PAIN: 0
COLOR CHANGE: 0

## 2024-08-26 ASSESSMENT — PAIN - FUNCTIONAL ASSESSMENT: PAIN_FUNCTIONAL_ASSESSMENT: 0-10

## 2024-08-26 ASSESSMENT — PAIN SCALES - GENERAL: PAINLEVEL_OUTOF10: 7

## 2024-08-26 ASSESSMENT — PAIN DESCRIPTION - DESCRIPTORS: DESCRIPTORS: SHARP;SHOOTING

## 2024-08-26 ASSESSMENT — PAIN DESCRIPTION - ORIENTATION: ORIENTATION: MID

## 2024-08-26 ASSESSMENT — PAIN DESCRIPTION - PAIN TYPE: TYPE: ACUTE PAIN

## 2024-08-26 NOTE — ED NOTES
[facility] Emergency Department  EDObservation Admission Note   Emergency Physicians       Time Placed in ED Observation: DISPOSITION Ed Observation 08/26/2024 04:08:29 PM  Condition at Disposition: Data Unavailable    Impression and Plan      In summary, Gabriel Maya is admitted by to the [facility] Observation Unit for chest pain.  Dr. Hunter is the observation admission attending.    This patient has been risk-stratified based on the available history, physical exam, and related study findings.  Admission to observation status for further diagnosis/treatment/monitoring of chest pain is warranted clinically.  This extended period of observation is specifically required to determine the need for hospitalization.      We will observe the patient for the following endpoints:    -Nuclear stress test      When met, appropriate disposition will be arranged.    Diagnostic Evaluation      Diagnostic studies and ED interventions germane to this period of clinical observation will include:    EKG  Troponins  Stress test       Consultant(s)      None       Patient History      Gabriel Maya is a 44 y.o. male who presented to the Emergency Department for evaluation of chest pain.  The acute evaluation included EKG, troponins, lab work, x-ray.  He has history of A-fib and has a rate of around 100-110 in the emergency department here.  He is on warfarin and has not missed any doses, though has not taken his dose yet today.    Upon admission to the Observation Unit, Gabriel Maya is hemodynamically stable and chest pain-free.      Past Medical History  Past Medical History:   Diagnosis Date    Atrial fibrillation (HCC)     History of bipolar disorder & PTSD 07/27/2021    Hypertension     Mixed hyperlipidemia 07/29/2021     Past Surgical History:   Procedure Laterality Date    INGUINAL HERNIA REPAIR Bilateral     WISDOM TOOTH EXTRACTION       Family History   Adopted: Yes   Family history unknown: Yes

## 2024-08-26 NOTE — ED PROVIDER NOTES
ED Attending Attestation Note     Date of evaluation: 8/26/2024    I have discussed the case with the HAYLEY. I have personally performed a history, physical exam, and my own medical decision making. I have reviewed the note and agree with the findings and plan.  I have reviewed the ECG and concur with the HAYLEY's interpretation.     INITIAL VITALS: BP: (!) 145/110, Temp: 97.9 °F (36.6 °C), Pulse: 53, Respirations: 20, SpO2: 99 %   Physical Exam  Vitals reviewed.   Cardiovascular:      Rate and Rhythm: Tachycardia present. Rhythm irregular.   Pulmonary:      Effort: Pulmonary effort is normal.         MDM:  My assessment reveals a well-appearing 44-year-old male with past medical history of A-fib on warfarin presenting with chest pain.  His workup today is reassuring however given the high risk nature of his diagnoses and history, he will be placed on ED observation for a nuclear stress test tomorrow.       Kamari Hunter MD  08/26/24 1291

## 2024-08-26 NOTE — ED PROVIDER NOTES
[facility]  EMERGENCY DEPARTMENT ENCOUNTER          PHYSICIAN ASSISTANT NOTE       Date of evaluation: 8/26/2024    Chief Complaint     Chest Pain (Patient with chief complaint of chest pain and SOB that started today around 1130 today. Patient states that he is a cardio patient here at [facility] for A fib)      History of Present Illness     Gabriel Maya is a 44 y.o. male with a PMH of HTN, afib on warfarin, and CHF with an ejection fraction of 25% who presents with multiple symptoms of chest pain, SOB, and right shoulder pain.   Patient states he was going to come to the ER today due to his right shoulder pain, but woke up with SOB and chest pain so is here for that as well.     Patient states he woke up around 9 am with SOB, \"panting\", and a \"weird feeling of I should not go back to sleep\". About 30 minutes later he began to have stabbing chest pain on the left side of his chest wall. He states these symptoms remind him of when he was originally diagnosed with afib.     Patient has had right shoulder pain on and off for \"a while\" but the pain and lack of ROM has been constant for ~1 week.     Patient is currently in no pain and comfortable in his room.    ASSESSMENT / PLAN  (MEDICAL DECISION MAKING)     INITIAL VITALS: BP: (!) 145/110, Temp: 97.9 °F (36.6 °C), Pulse: 53, Respirations: 20, SpO2: 99 %    Gabriel Maya is a 44 y.o. male with a PMH of HTN, afib on warfarin, and CHF with an ejection fraction of 25% who presents with multiple symptoms of chest pain, SOB, and right shoulder pain.   Patient states he was going to come to the ER today due to his right shoulder pain, but woke up with SOB and chest pain so is here for that as well.     Patient states he woke up around 9 am with SOB, \"panting\", and a \"weird feeling of I should not go back to sleep\". About 30 minutes later he began to have stabbing chest pain on the left side of his chest wall. He states these symptoms remind him of when he  Kitty Bustillos MD       The patient was given the following medications:  Orders Placed This Encounter   Medications    sodium chloride flush 0.9 % injection 5-40 mL    sodium chloride flush 0.9 % injection 5-40 mL    0.9 % sodium chloride infusion    OR Linked Order Group     potassium chloride (KLOR-CON M) extended release tablet 40 mEq     potassium bicarb-citric acid (EFFER-K) effervescent tablet 40 mEq     potassium chloride 10 mEq/100 mL IVPB (Peripheral Line)    magnesium sulfate 2000 mg in 50 mL IVPB premix    aspirin chewable tablet 81 mg       CONSULTS:  None    Review of Systems     Review of Systems   Constitutional:  Negative for chills, diaphoresis and fatigue.   HENT:  Negative for congestion, postnasal drip, rhinorrhea, sinus pressure and sore throat.    Eyes:  Negative for discharge, itching and visual disturbance.   Respiratory:  Positive for shortness of breath. Negative for choking and chest tightness.    Cardiovascular:  Positive for chest pain and palpitations.   Gastrointestinal:  Negative for abdominal pain, constipation, diarrhea, nausea and vomiting.   Endocrine: Negative for polyphagia and polyuria.   Genitourinary:  Negative for difficulty urinating, dysuria, flank pain and hematuria.   Musculoskeletal:  Negative for arthralgias, back pain and gait problem.        Right shoulder pain    Skin:  Negative for color change and wound.   Allergic/Immunologic: Negative for immunocompromised state.   Neurological:  Negative for dizziness, syncope, light-headedness and headaches.   Hematological:  Does not bruise/bleed easily.   Psychiatric/Behavioral:  Negative for behavioral problems and confusion.    All other systems reviewed and are negative.      Past Medical, Surgical, Family, and Social History     He has a past medical history of Atrial fibrillation (HCC), History of bipolar disorder & PTSD, Hypertension, and Mixed hyperlipidemia.  He has a past surgical history that includes [place]

## 2024-08-27 ENCOUNTER — HOSPITAL ENCOUNTER (OUTPATIENT)
Age: 44
Setting detail: OBSERVATION
Discharge: HOME | End: 2024-08-29
Attending: STUDENT IN AN ORGANIZED HEALTH CARE EDUCATION/TRAINING PROGRAM
Payer: MEDICAID

## 2024-08-27 ENCOUNTER — APPOINTMENT (OUTPATIENT)
Dept: NUCLEAR MEDICINE | Age: 44
End: 2024-08-27
Attending: STUDENT IN AN ORGANIZED HEALTH CARE EDUCATION/TRAINING PROGRAM
Payer: MEDICAID

## 2024-08-27 VITALS
SYSTOLIC BLOOD PRESSURE: 152 MMHG | HEART RATE: 109 BPM | HEIGHT: 68 IN | RESPIRATION RATE: 18 BRPM | WEIGHT: 315 LBS | BODY MASS INDEX: 47.74 KG/M2 | TEMPERATURE: 97.9 F | DIASTOLIC BLOOD PRESSURE: 102 MMHG | OXYGEN SATURATION: 99 %

## 2024-08-27 PROCEDURE — G0378 HOSPITAL OBSERVATION PER HR: HCPCS

## 2024-08-27 PROCEDURE — 2580000003 HC RX 258: Performed by: STUDENT IN AN ORGANIZED HEALTH CARE EDUCATION/TRAINING PROGRAM

## 2024-08-27 PROCEDURE — 96374 THER/PROPH/DIAG INJ IV PUSH: CPT

## 2024-08-27 PROCEDURE — 2500000003 HC RX 250 WO HCPCS: Performed by: STUDENT IN AN ORGANIZED HEALTH CARE EDUCATION/TRAINING PROGRAM

## 2024-08-27 PROCEDURE — 6370000000 HC RX 637 (ALT 250 FOR IP): Performed by: STUDENT IN AN ORGANIZED HEALTH CARE EDUCATION/TRAINING PROGRAM

## 2024-08-27 PROCEDURE — 96376 TX/PRO/DX INJ SAME DRUG ADON: CPT

## 2024-08-27 RX ORDER — AMIODARONE HYDROCHLORIDE 200 MG/1
200 TABLET ORAL DAILY
Qty: 90 TABLET | Refills: 3 | Status: SHIPPED | OUTPATIENT
Start: 2024-08-27

## 2024-08-27 RX ORDER — METOPROLOL SUCCINATE 25 MG/1
150 TABLET, EXTENDED RELEASE ORAL ONCE
Status: COMPLETED | OUTPATIENT
Start: 2024-08-27 | End: 2024-08-27

## 2024-08-27 RX ORDER — METOPROLOL SUCCINATE 50 MG/1
150 TABLET, EXTENDED RELEASE ORAL DAILY
Qty: 90 TABLET | Refills: 3 | Status: SHIPPED | OUTPATIENT
Start: 2024-08-27

## 2024-08-27 RX ORDER — METOPROLOL TARTRATE 1 MG/ML
5 INJECTION, SOLUTION INTRAVENOUS ONCE
Status: COMPLETED | OUTPATIENT
Start: 2024-08-27 | End: 2024-08-27

## 2024-08-27 RX ORDER — WARFARIN SODIUM 5 MG/1
5 TABLET ORAL
Status: COMPLETED | OUTPATIENT
Start: 2024-08-27 | End: 2024-08-27

## 2024-08-27 RX ORDER — FUROSEMIDE 40 MG
40 TABLET ORAL ONCE
Status: COMPLETED | OUTPATIENT
Start: 2024-08-27 | End: 2024-08-27

## 2024-08-27 RX ORDER — LOSARTAN POTASSIUM 50 MG/1
25 TABLET ORAL DAILY
Status: DISCONTINUED | OUTPATIENT
Start: 2024-08-27 | End: 2024-08-27

## 2024-08-27 RX ORDER — WARFARIN SODIUM 2.5 MG/1
TABLET ORAL
COMMUNITY

## 2024-08-27 RX ORDER — LOSARTAN POTASSIUM 25 MG/1
25 TABLET ORAL DAILY
Qty: 90 TABLET | Refills: 2 | Status: SHIPPED | OUTPATIENT
Start: 2024-08-27

## 2024-08-27 RX ORDER — LOSARTAN POTASSIUM 50 MG/1
25 TABLET ORAL ONCE
Status: COMPLETED | OUTPATIENT
Start: 2024-08-27 | End: 2024-08-27

## 2024-08-27 RX ORDER — SPIRONOLACTONE 25 MG/1
25 TABLET ORAL DAILY
Status: DISCONTINUED | OUTPATIENT
Start: 2024-08-27 | End: 2024-08-27

## 2024-08-27 RX ORDER — SPIRONOLACTONE 25 MG/1
25 TABLET ORAL ONCE
Status: COMPLETED | OUTPATIENT
Start: 2024-08-27 | End: 2024-08-27

## 2024-08-27 RX ORDER — AMIODARONE HYDROCHLORIDE 200 MG/1
200 TABLET ORAL ONCE
Status: COMPLETED | OUTPATIENT
Start: 2024-08-27 | End: 2024-08-27

## 2024-08-27 RX ORDER — SPIRONOLACTONE 25 MG/1
25 TABLET ORAL DAILY
Qty: 90 TABLET | Refills: 3 | Status: SHIPPED | OUTPATIENT
Start: 2024-08-27

## 2024-08-27 RX ORDER — FUROSEMIDE 40 MG
40 TABLET ORAL 2 TIMES DAILY
Qty: 60 TABLET | Refills: 5 | Status: SHIPPED | OUTPATIENT
Start: 2024-08-27

## 2024-08-27 RX ADMIN — METOPROLOL SUCCINATE 150 MG: 25 TABLET, EXTENDED RELEASE ORAL at 13:35

## 2024-08-27 RX ADMIN — WARFARIN SODIUM 5 MG: 5 TABLET ORAL at 14:49

## 2024-08-27 RX ADMIN — AMIODARONE HYDROCHLORIDE 200 MG: 200 TABLET ORAL at 13:35

## 2024-08-27 RX ADMIN — LOSARTAN POTASSIUM 25 MG: 50 TABLET, FILM COATED ORAL at 13:35

## 2024-08-27 RX ADMIN — METOPROLOL TARTRATE 5 MG: 1 INJECTION, SOLUTION INTRAVENOUS at 12:34

## 2024-08-27 RX ADMIN — ASPIRIN 81 MG: 81 TABLET, CHEWABLE ORAL at 11:18

## 2024-08-27 RX ADMIN — SPIRONOLACTONE 25 MG: 25 TABLET ORAL at 13:35

## 2024-08-27 RX ADMIN — FUROSEMIDE 40 MG: 40 TABLET ORAL at 13:35

## 2024-08-27 RX ADMIN — SODIUM CHLORIDE, PRESERVATIVE FREE 10 ML: 5 INJECTION INTRAVENOUS at 11:18

## 2024-08-27 RX ADMIN — METOPROLOL TARTRATE 5 MG: 1 INJECTION, SOLUTION INTRAVENOUS at 14:10

## 2024-08-27 NOTE — DISCHARGE INSTRUCTIONS
We saw you in the hospital for atrial fibrillation.     You were treated with metoprolol, amiodarone.     Continue your home medications as prescribed. Your metoprolol, amiodarone, losartan and spironolactone were refilled.    You need to see your regular doctor in 2-3 days to be checked. Please contact your regular doctor and schedule an appointment.    You should return to the emergency department if your symptoms worsen or do not resolve. In addition, return if:  - You have a fever (greater than 101 degrees)  - You have chest pain, shortness of breath, abdominal pain, or uncontrollable vomiting  - You are unable to eat or drink  - You pass out  - You have difficulty moving your arms or legs   - You have difficulty speaking or slurred speech  - Or you have any concern that you feel needs immediate physician evaluation.

## 2024-08-27 NOTE — ED NOTES
[facility] Emergency Department  ED Observation Disposition Note   Emergency Physicians         Diagnostic Evaluation      Diagnostic studies germane to this period of clinical observation include:    Laboratory testing  Reassessment     Consultant(s) Final Recommendations      - None     Impression and Plan      Gabriel Maya has been cared for according to the standard [facility] observation protocol for chest pain/stress testing. This extended period of observation was specifically required to determine the need for hospitalization. Prior to discharge from observation, the final physical exam is documented below.      Significant events during the course of observation based on the goals of the clinical problem list include:    - Unfortunately, stress testing unable to be completed given patient was in atrial fibrillation with RVR with rates into the 160s. Patient was asymptomatic at this time. Patient notes that he has been out of his metoprolol for a few days, and suspect this is the etiology of his RVR.   - Patient has a normal LHC performed during admission 3/24, therefore overall low concern for ACS.   - Patient's home medications were restarted, with 2 additional doses of 5mg metoprolol with adequate rate control  - Patient feels asymptomatic at this time, appears euvolemic and is appropriate for outpatient follow up with his cardiology team.    Based on the patient's condition, clinical response, and diagnostic information obtained during this period of observation, the plan is to Discharge to home    The total length of observation was 25 hours. Dr. Hinds is the observation disposition attending.    The patient will follow-up with:    - Cardiology    As appropriate, please see the AVS for comprehensive discharge instructions.       Subjective      Gabriel Maya has undergone comprehensive diagnostic evaluation and therapeutic management in accordance with the CDU guidelines

## 2024-08-27 NOTE — PLAN OF CARE
Call received from the ED to admit the patient.  Discussed with the ED provider    Patient had a negative cardiac angiogram on 3/2024.  Does not need a stress test    At present patient's heart rate is uncontrolled.  They will try to rate control at in the ED.  If unable to do so or if there is a different reason for admission they will reach back to the hospitalist    Zina Escobar MD

## 2024-08-29 ENCOUNTER — TELEPHONE (OUTPATIENT)
Dept: PRIMARY CARE CLINIC | Age: 44
End: 2024-08-29

## 2024-08-29 NOTE — TELEPHONE ENCOUNTER
Care Transitions Initial Follow Up Call    Outreach made within 2 business days of discharge: Yes    Patient: Gabriel Maya Patient : 1980   MRN: [id number]  Reason for Admission: chest pain  Discharge Date: 24       Spoke with: YENY for patient    Discharge department/facility: [facility]     TCM Interactive Patient Contact:  Was patient able to fill all prescriptions:   Was patient instructed to bring all medications to the follow-up visit:   Is patient taking all medications as directed in the discharge summary?   Does patient understand their discharge instructions:   Does patient have questions or concerns that need addressed prior to 7-14 day follow up office visit:     Additional needs identified to be addressed with provider  No needs identified             Scheduled appointment with PCP within 7-14 days    Follow Up  Future Appointments   Date Time Provider Department Center   2024  4:15 PM Aldair Torres MD [facility] [facility]   2024  8:30 AM [facility]NAG[facility]   2024  8:00 AM [facility]   2024  9:00 AM Shannon Shaikh MD [place] [facility]   2025  9:00 AM Aldair Torres MD [place]       Jayleen Samson MA

## 2024-08-30 ENCOUNTER — TELEPHONE (OUTPATIENT)
Dept: SLEEP CENTER | Age: 44
End: 2024-08-30

## 2024-08-30 NOTE — TELEPHONE ENCOUNTER
Returned pt's call to reschedule psg sleep study     Originally scheduled at [facility] for him to call us back  
Seizures

## 2024-09-04 ENCOUNTER — HOSPITAL ENCOUNTER (OUTPATIENT)
Dept: SLEEP CENTER | Age: 44
Discharge: HOME | End: 2024-09-04
Attending: INTERNAL MEDICINE

## 2024-09-04 PROCEDURE — 95810 POLYSOM 6/> YRS 4/> PARAM: CPT

## 2024-09-06 ENCOUNTER — TELEPHONE (OUTPATIENT)
Dept: PULMONOLOGY | Age: 44
End: 2024-09-06

## 2024-09-06 NOTE — PROCEDURES
PROCEDURE NOTE  Date: 9/5/2024   Name: Gabriel Maya  YOB: 1980    Procedures                Electronically signed by GERALDINE WOODS MD on 9/5/24 at 10:41 PM EDT

## 2024-09-19 ENCOUNTER — TELEPHONE (OUTPATIENT)
Dept: PHARMACY | Age: 44
End: 2024-09-19

## 2024-09-19 DIAGNOSIS — I48.91 ATRIAL FIBRILLATION WITH RAPID VENTRICULAR RESPONSE (HCC): Primary | ICD-10-CM

## 2024-09-24 ENCOUNTER — HOSPITAL ENCOUNTER (EMERGENCY)
Age: 44
Discharge: HOME | End: 2024-09-24

## 2024-09-24 VITALS
SYSTOLIC BLOOD PRESSURE: 161 MMHG | OXYGEN SATURATION: 100 % | WEIGHT: 310 LBS | HEIGHT: 69 IN | DIASTOLIC BLOOD PRESSURE: 126 MMHG | HEART RATE: 57 BPM | BODY MASS INDEX: 45.91 KG/M2 | RESPIRATION RATE: 20 BRPM | TEMPERATURE: 98.1 F

## 2024-09-24 DIAGNOSIS — R09.81 NASAL CONGESTION: ICD-10-CM

## 2024-09-24 DIAGNOSIS — J34.89 RHINORRHEA: Primary | ICD-10-CM

## 2024-09-24 PROCEDURE — 99283 EMERGENCY DEPT VISIT LOW MDM: CPT

## 2024-09-24 RX ORDER — DIPHENHYDRAMINE HCL 25 MG
25 CAPSULE ORAL EVERY 6 HOURS PRN
Qty: 20 CAPSULE | Refills: 0 | Status: SHIPPED | OUTPATIENT
Start: 2024-09-24 | End: 2024-10-04

## 2024-09-24 RX ORDER — WARFARIN SODIUM 5 MG/1
TABLET ORAL
Qty: 90 TABLET | Refills: 1 | Status: SHIPPED | OUTPATIENT
Start: 2024-09-24

## 2024-09-24 RX ORDER — GUAIFENESIN 600 MG/1
600 TABLET, EXTENDED RELEASE ORAL 2 TIMES DAILY
Qty: 30 TABLET | Refills: 0 | Status: SHIPPED | OUTPATIENT
Start: 2024-09-24 | End: 2024-10-09

## 2024-09-24 ASSESSMENT — PAIN - FUNCTIONAL ASSESSMENT: PAIN_FUNCTIONAL_ASSESSMENT: NONE - DENIES PAIN

## 2024-09-24 ASSESSMENT — ENCOUNTER SYMPTOMS
RESPIRATORY NEGATIVE: 1
RHINORRHEA: 1
GASTROINTESTINAL NEGATIVE: 1

## 2024-10-10 ENCOUNTER — TELEPHONE (OUTPATIENT)
Dept: PHARMACY | Age: 44
End: 2024-10-10

## 2024-10-14 NOTE — TELEPHONE ENCOUNTER
LVM #2.     Archana Herman, PharmD, [facility]  [facility]  [place]: [phone]  [place]: [phone]  10/14/2024 12:39 PM

## 2024-10-22 NOTE — TELEPHONE ENCOUNTER
LVM #3    Nida Guillermo, PharmD, BCACP  Medication Management Clinic   [facility] [place] [facility]: [phone]  [facility] [place] Ph: [phone]  10/22/2024 11:14 AM

## 2024-12-11 NOTE — TELEPHONE ENCOUNTER
INR check r/s for 12/12.      Sujata Perry, PharmD  [facility]  [place]: [phone]  [place]: [phone]  12/11/2024 5:18 PM

## 2024-12-12 ENCOUNTER — ANTI-COAG VISIT (OUTPATIENT)
Dept: PHARMACY | Age: 44
End: 2024-12-12
Payer: COMMERCIAL

## 2024-12-12 DIAGNOSIS — I48.91 ATRIAL FIBRILLATION WITH RAPID VENTRICULAR RESPONSE (HCC): Primary | ICD-10-CM

## 2024-12-12 DIAGNOSIS — I50.21 ACUTE SYSTOLIC CHF (CONGESTIVE HEART FAILURE), NYHA CLASS 2 (HCC): ICD-10-CM

## 2024-12-12 LAB
INR BLD: 2.5 (ref 2–3)
PROTIME: NORMAL

## 2024-12-12 PROCEDURE — 85610 PROTHROMBIN TIME: CPT | Performed by: PHARMACIST

## 2024-12-12 PROCEDURE — 99211 OFF/OP EST MAY X REQ PHY/QHP: CPT | Performed by: PHARMACIST

## 2024-12-12 NOTE — PROGRESS NOTES
2024     2024       Patient History:  Recent hospitalizations/HC visits 3/24-: new onset a.fib and CHF  : ED for abdominal pain / dehydration  -: admission for a.fib with RVR   Recent medication changes 3/29: started on xarelto  : Xarelto stopped, changed to warfarin  : metoprolol dose increase   Medications taken regularly that may interact with warfarin or alter INR amiodarone   Warfarin dose taken as prescribed No - Uses pillbox   Signs/symptoms of bleeding No -  H/o bleeding   Vitamin K intake Normally has ~0-1 servings of green, leafy vegetables per day   Recent vomiting/diarrhea/fever, changes in weight or activity level None reported   Tobacco or alcohol use Patient reports no smoking   Patient reports having occasional drink - social   Upcoming surgeries or procedures None reported     Assessment/Plan:  Patient's INR was therapeutic today. Patient has been taking their warfarin dose differently: 2.5 mg on Tue/Thu, and 5 mg all other days instead of 2.5 mg on // and 5 mg all other days.  Patient does report 1-2 missed doses in the past month (have not seen patient in clinic since 2024).     Patient was instructed to continue dose of warfarin as 2.5 mg  and , and 5 mg all other days.  Recheck INR in 3 weeks.       Thanks!  Beckie Govea  PharmD Candidate   Medication Management Clinic   [facility] [place] [facility]: [phone]  [facility] [place] [facility]: [phone]  2024 9:12 AM    For Pharmacy Admin Tracking Only    Intervention Detail: Adherence Monitorin  Total # of Interventions Recommended: 1  Total # of Interventions Accepted: 1  Time Spent (min): 15,

## 2025-01-02 ENCOUNTER — TELEPHONE (OUTPATIENT)
Dept: PHARMACY | Age: 45
End: 2025-01-02

## 2025-01-02 NOTE — TELEPHONE ENCOUNTER
Patient no-showed to [facility] visit today.  Called patient to reschedule appointment. VM full, unable to LVM.    Maira Guillermo, PharmD, BCACP  1/2/2025 10:40 AM

## 2025-01-08 NOTE — TELEPHONE ENCOUNTER
Called; same result.     Nida Guillermo, PharmD, BCACP  Medication Management Clinic   [facility] [place] [facility]: [phone]  [facility] [place] [facility]: [phone]  1/8/2025 11:31 AM

## 2025-01-09 ENCOUNTER — HOSPITAL ENCOUNTER (INPATIENT)
Age: 45
LOS: 1 days | Discharge: HOME | DRG: 309 | End: 2025-01-11
Attending: EMERGENCY MEDICINE | Admitting: INTERNAL MEDICINE
Payer: COMMERCIAL

## 2025-01-09 ENCOUNTER — APPOINTMENT (OUTPATIENT)
Dept: GENERAL RADIOLOGY | Age: 45
DRG: 309 | End: 2025-01-09
Payer: COMMERCIAL

## 2025-01-09 DIAGNOSIS — I48.91 ATRIAL FIBRILLATION, UNSPECIFIED TYPE (HCC): Primary | ICD-10-CM

## 2025-01-09 DIAGNOSIS — I48.91 ATRIAL FIBRILLATION WITH RAPID VENTRICULAR RESPONSE (HCC): ICD-10-CM

## 2025-01-09 LAB
ANION GAP SERPL CALCULATED.3IONS-SCNC: 14 MMOL/L (ref 3–16)
BASOPHILS # BLD: 0.1 K/UL (ref 0–0.2)
BASOPHILS NFR BLD: 0.7 %
BUN SERPL-MCNC: 17 MG/DL (ref 7–20)
CALCIUM SERPL-MCNC: 9.8 MG/DL (ref 8.3–10.6)
CHLORIDE SERPL-SCNC: 99 MMOL/L (ref 99–110)
CO2 SERPL-SCNC: 24 MMOL/L (ref 21–32)
CREAT SERPL-MCNC: 1.3 MG/DL (ref 0.9–1.3)
DEPRECATED RDW RBC AUTO: 13.6 % (ref 12.4–15.4)
EOSINOPHIL # BLD: 0 K/UL (ref 0–0.6)
EOSINOPHIL NFR BLD: 0.4 %
GFR SERPLBLD CREATININE-BSD FMLA CKD-EPI: 68 ML/MIN/{1.73_M2}
GLUCOSE SERPL-MCNC: 127 MG/DL (ref 70–99)
HCT VFR BLD AUTO: 51.6 % (ref 40.5–52.5)
HGB BLD-MCNC: 17.7 G/DL (ref 13.5–17.5)
INR PPP: 0.97 (ref 0.85–1.15)
LYMPHOCYTES # BLD: 2.7 K/UL (ref 1–5.1)
LYMPHOCYTES NFR BLD: 19.8 %
MAGNESIUM SERPL-MCNC: 2.06 MG/DL (ref 1.8–2.4)
MCH RBC QN AUTO: 32 PG (ref 26–34)
MCHC RBC AUTO-ENTMCNC: 34.3 G/DL (ref 31–36)
MCV RBC AUTO: 93.1 FL (ref 80–100)
MONOCYTES # BLD: 1.1 K/UL (ref 0–1.3)
MONOCYTES NFR BLD: 8.3 %
NEUTROPHILS # BLD: 9.8 K/UL (ref 1.7–7.7)
NEUTROPHILS NFR BLD: 70.8 %
NT-PROBNP SERPL-MCNC: 119 PG/ML (ref 0–124)
PLATELET # BLD AUTO: 282 K/UL (ref 135–450)
PMV BLD AUTO: 9.5 FL (ref 5–10.5)
POTASSIUM SERPL-SCNC: 4.3 MMOL/L (ref 3.5–5.1)
PROTHROMBIN TIME: 13.1 SEC (ref 11.9–14.9)
RBC # BLD AUTO: 5.54 M/UL (ref 4.2–5.9)
SODIUM SERPL-SCNC: 137 MMOL/L (ref 136–145)
TROPONIN, HIGH SENSITIVITY: 7 NG/L (ref 0–22)
WBC # BLD AUTO: 13.8 K/UL (ref 4–11)

## 2025-01-09 PROCEDURE — 85610 PROTHROMBIN TIME: CPT

## 2025-01-09 PROCEDURE — 83735 ASSAY OF MAGNESIUM: CPT

## 2025-01-09 PROCEDURE — 93005 ELECTROCARDIOGRAM TRACING: CPT | Performed by: EMERGENCY MEDICINE

## 2025-01-09 PROCEDURE — 80048 BASIC METABOLIC PNL TOTAL CA: CPT

## 2025-01-09 PROCEDURE — 83880 ASSAY OF NATRIURETIC PEPTIDE: CPT

## 2025-01-09 PROCEDURE — 36415 COLL VENOUS BLD VENIPUNCTURE: CPT

## 2025-01-09 PROCEDURE — 84484 ASSAY OF TROPONIN QUANT: CPT

## 2025-01-09 PROCEDURE — 99285 EMERGENCY DEPT VISIT HI MDM: CPT

## 2025-01-09 PROCEDURE — 71045 X-RAY EXAM CHEST 1 VIEW: CPT

## 2025-01-09 PROCEDURE — 85025 COMPLETE CBC W/AUTO DIFF WBC: CPT

## 2025-01-09 ASSESSMENT — LIFESTYLE VARIABLES
HOW OFTEN DO YOU HAVE A DRINK CONTAINING ALCOHOL: NEVER
HOW MANY STANDARD DRINKS CONTAINING ALCOHOL DO YOU HAVE ON A TYPICAL DAY: PATIENT DOES NOT DRINK

## 2025-01-10 LAB
ALBUMIN SERPL-MCNC: 4.3 G/DL (ref 3.4–5)
ANION GAP SERPL CALCULATED.3IONS-SCNC: 15 MMOL/L (ref 3–16)
BASOPHILS # BLD: 0.1 K/UL (ref 0–0.2)
BASOPHILS NFR BLD: 0.8 %
BUN SERPL-MCNC: 19 MG/DL (ref 7–20)
CALCIUM SERPL-MCNC: 9.9 MG/DL (ref 8.3–10.6)
CHLORIDE SERPL-SCNC: 97 MMOL/L (ref 99–110)
CO2 SERPL-SCNC: 25 MMOL/L (ref 21–32)
CREAT SERPL-MCNC: 1.5 MG/DL (ref 0.9–1.3)
DEPRECATED RDW RBC AUTO: 13.4 % (ref 12.4–15.4)
EKG ATRIAL RATE: 166 BPM
EKG DIAGNOSIS: NORMAL
EKG Q-T INTERVAL: 300 MS
EKG QRS DURATION: 88 MS
EKG QTC CALCULATION (BAZETT): 507 MS
EKG R AXIS: -48 DEGREES
EKG T AXIS: 16 DEGREES
EKG VENTRICULAR RATE: 172 BPM
EOSINOPHIL # BLD: 0 K/UL (ref 0–0.6)
EOSINOPHIL NFR BLD: 0.3 %
GFR SERPLBLD CREATININE-BSD FMLA CKD-EPI: 58 ML/MIN/{1.73_M2}
GLUCOSE SERPL-MCNC: 166 MG/DL (ref 70–99)
HCT VFR BLD AUTO: 50.5 % (ref 40.5–52.5)
HGB BLD-MCNC: 16.8 G/DL (ref 13.5–17.5)
INR PPP: 1.21 (ref 0.85–1.15)
LYMPHOCYTES # BLD: 2.7 K/UL (ref 1–5.1)
LYMPHOCYTES NFR BLD: 18.3 %
MAGNESIUM SERPL-MCNC: 2.02 MG/DL (ref 1.8–2.4)
MCH RBC QN AUTO: 31.4 PG (ref 26–34)
MCHC RBC AUTO-ENTMCNC: 33.2 G/DL (ref 31–36)
MCV RBC AUTO: 94.8 FL (ref 80–100)
MONOCYTES # BLD: 0.9 K/UL (ref 0–1.3)
MONOCYTES NFR BLD: 6.1 %
NEUTROPHILS # BLD: 10.9 K/UL (ref 1.7–7.7)
NEUTROPHILS NFR BLD: 74.5 %
PHOSPHATE SERPL-MCNC: 4.1 MG/DL (ref 2.5–4.9)
PLATELET # BLD AUTO: 283 K/UL (ref 135–450)
PMV BLD AUTO: 10.4 FL (ref 5–10.5)
POTASSIUM SERPL-SCNC: 4.1 MMOL/L (ref 3.5–5.1)
PROTHROMBIN TIME: 15.5 SEC (ref 11.9–14.9)
RBC # BLD AUTO: 5.33 M/UL (ref 4.2–5.9)
SODIUM SERPL-SCNC: 137 MMOL/L (ref 136–145)
TROPONIN, HIGH SENSITIVITY: 7 NG/L (ref 0–22)
WBC # BLD AUTO: 14.6 K/UL (ref 4–11)

## 2025-01-10 PROCEDURE — 80069 RENAL FUNCTION PANEL: CPT

## 2025-01-10 PROCEDURE — 83735 ASSAY OF MAGNESIUM: CPT

## 2025-01-10 PROCEDURE — 84484 ASSAY OF TROPONIN QUANT: CPT

## 2025-01-10 PROCEDURE — 6370000000 HC RX 637 (ALT 250 FOR IP)

## 2025-01-10 PROCEDURE — 36415 COLL VENOUS BLD VENIPUNCTURE: CPT

## 2025-01-10 PROCEDURE — 96376 TX/PRO/DX INJ SAME DRUG ADON: CPT

## 2025-01-10 PROCEDURE — 85025 COMPLETE CBC W/AUTO DIFF WBC: CPT

## 2025-01-10 PROCEDURE — 96374 THER/PROPH/DIAG INJ IV PUSH: CPT

## 2025-01-10 PROCEDURE — 6360000002 HC RX W HCPCS

## 2025-01-10 PROCEDURE — 2500000003 HC RX 250 WO HCPCS

## 2025-01-10 PROCEDURE — 2060000000 HC ICU INTERMEDIATE R&B

## 2025-01-10 PROCEDURE — 85610 PROTHROMBIN TIME: CPT

## 2025-01-10 PROCEDURE — 99223 1ST HOSP IP/OBS HIGH 75: CPT | Performed by: INTERNAL MEDICINE

## 2025-01-10 PROCEDURE — 2580000003 HC RX 258

## 2025-01-10 RX ORDER — ACETAMINOPHEN 325 MG/1
650 TABLET ORAL EVERY 6 HOURS PRN
Status: DISCONTINUED | OUTPATIENT
Start: 2025-01-10 | End: 2025-01-11 | Stop reason: HOSPADM

## 2025-01-10 RX ORDER — ENOXAPARIN SODIUM 100 MG/ML
30 INJECTION SUBCUTANEOUS 2 TIMES DAILY
Status: DISCONTINUED | OUTPATIENT
Start: 2025-01-10 | End: 2025-01-11 | Stop reason: HOSPADM

## 2025-01-10 RX ORDER — SPIRONOLACTONE 25 MG/1
25 TABLET ORAL DAILY
Status: DISCONTINUED | OUTPATIENT
Start: 2025-01-10 | End: 2025-01-11 | Stop reason: HOSPADM

## 2025-01-10 RX ORDER — DEXTROSE MONOHYDRATE 100 MG/ML
INJECTION, SOLUTION INTRAVENOUS CONTINUOUS PRN
Status: DISCONTINUED | OUTPATIENT
Start: 2025-01-10 | End: 2025-01-11 | Stop reason: HOSPADM

## 2025-01-10 RX ORDER — SODIUM CHLORIDE 9 MG/ML
INJECTION, SOLUTION INTRAVENOUS PRN
Status: DISCONTINUED | OUTPATIENT
Start: 2025-01-10 | End: 2025-01-11 | Stop reason: HOSPADM

## 2025-01-10 RX ORDER — SODIUM CHLORIDE 0.9 % (FLUSH) 0.9 %
5-40 SYRINGE (ML) INJECTION EVERY 12 HOURS SCHEDULED
Status: DISCONTINUED | OUTPATIENT
Start: 2025-01-10 | End: 2025-01-11 | Stop reason: HOSPADM

## 2025-01-10 RX ORDER — SODIUM CHLORIDE 0.9 % (FLUSH) 0.9 %
5-40 SYRINGE (ML) INJECTION PRN
Status: DISCONTINUED | OUTPATIENT
Start: 2025-01-10 | End: 2025-01-11 | Stop reason: HOSPADM

## 2025-01-10 RX ORDER — FUROSEMIDE 40 MG/1
40 TABLET ORAL 2 TIMES DAILY
Status: DISCONTINUED | OUTPATIENT
Start: 2025-01-10 | End: 2025-01-11 | Stop reason: HOSPADM

## 2025-01-10 RX ORDER — GLUCAGON 1 MG/ML
1 KIT INJECTION PRN
Status: DISCONTINUED | OUTPATIENT
Start: 2025-01-10 | End: 2025-01-11 | Stop reason: HOSPADM

## 2025-01-10 RX ORDER — POLYETHYLENE GLYCOL 3350 17 G/17G
17 POWDER, FOR SOLUTION ORAL DAILY PRN
Status: DISCONTINUED | OUTPATIENT
Start: 2025-01-10 | End: 2025-01-11 | Stop reason: HOSPADM

## 2025-01-10 RX ORDER — LOSARTAN POTASSIUM 25 MG/1
25 TABLET ORAL DAILY
Status: DISCONTINUED | OUTPATIENT
Start: 2025-01-10 | End: 2025-01-11 | Stop reason: HOSPADM

## 2025-01-10 RX ORDER — WARFARIN SODIUM 2.5 MG/1
2.5 TABLET ORAL
Status: DISCONTINUED | OUTPATIENT
Start: 2025-01-14 | End: 2025-01-11 | Stop reason: HOSPADM

## 2025-01-10 RX ORDER — WARFARIN SODIUM 5 MG/1
5 TABLET ORAL
Status: DISCONTINUED | OUTPATIENT
Start: 2025-01-10 | End: 2025-01-11 | Stop reason: HOSPADM

## 2025-01-10 RX ORDER — WARFARIN SODIUM 5 MG/1
5 TABLET ORAL
Status: DISCONTINUED | OUTPATIENT
Start: 2025-01-11 | End: 2025-01-10

## 2025-01-10 RX ORDER — ACETAMINOPHEN 650 MG/1
650 SUPPOSITORY RECTAL EVERY 6 HOURS PRN
Status: DISCONTINUED | OUTPATIENT
Start: 2025-01-10 | End: 2025-01-11 | Stop reason: HOSPADM

## 2025-01-10 RX ORDER — WARFARIN SODIUM 2.5 MG/1
2.5 TABLET ORAL
Status: DISCONTINUED | OUTPATIENT
Start: 2025-01-10 | End: 2025-01-10

## 2025-01-10 RX ADMIN — ENOXAPARIN SODIUM 30 MG: 100 INJECTION SUBCUTANEOUS at 21:41

## 2025-01-10 RX ADMIN — SODIUM CHLORIDE, PRESERVATIVE FREE 10 ML: 5 INJECTION INTRAVENOUS at 07:59

## 2025-01-10 RX ADMIN — AMIODARONE HYDROCHLORIDE 1 MG/MIN: 1.8 INJECTION, SOLUTION INTRAVENOUS at 02:41

## 2025-01-10 RX ADMIN — WARFARIN SODIUM 5 MG: 5 TABLET ORAL at 18:26

## 2025-01-10 RX ADMIN — AMIODARONE HYDROCHLORIDE 0.5 MG/MIN: 1.8 INJECTION, SOLUTION INTRAVENOUS at 08:57

## 2025-01-10 RX ADMIN — METOPROLOL SUCCINATE 150 MG: 100 TABLET, EXTENDED RELEASE ORAL at 11:50

## 2025-01-10 RX ADMIN — AMIODARONE HYDROCHLORIDE 150 MG: 50 INJECTION, SOLUTION INTRAVENOUS at 01:20

## 2025-01-10 RX ADMIN — LOSARTAN POTASSIUM 25 MG: 25 TABLET, FILM COATED ORAL at 07:57

## 2025-01-10 RX ADMIN — AMIODARONE HYDROCHLORIDE 0.5 MG/MIN: 1.8 INJECTION, SOLUTION INTRAVENOUS at 22:26

## 2025-01-10 RX ADMIN — ENOXAPARIN SODIUM 30 MG: 100 INJECTION SUBCUTANEOUS at 08:59

## 2025-01-10 RX ADMIN — SPIRONOLACTONE 25 MG: 25 TABLET ORAL at 07:57

## 2025-01-10 ASSESSMENT — ENCOUNTER SYMPTOMS
SHORTNESS OF BREATH: 0
NAUSEA: 0
DIARRHEA: 0
ABDOMINAL PAIN: 0
VOMITING: 0
CONSTIPATION: 0

## 2025-01-10 NOTE — CARE COORDINATION
Case Management Assessment  Initial Evaluation    Date/Time of Evaluation: 1/10/2025 9:49 AM  Assessment Completed by: Maximilian Piña RN    If patient is discharged prior to next notation, then this note serves as note for discharge by case management.    Patient Name: Gabriel Maya                   YOB: 1980  Diagnosis: Atrial fibrillation with rapid ventricular response (HCC) [I48.91]  Atrial fibrillation, unspecified type (HCC) [I48.91]                   Date / Time: 1/9/2025 10:51 PM    Patient Admission Status: Inpatient   Readmission Risk (Low < 19, Mod (19-27), High > 27): Readmission Risk Score: 11.5    Current PCP: Joe Perla, DO  PCP verified by CM? Yes    Chart Reviewed: Yes      History Provided by: Medical Record  Patient Orientation: Alert and Oriented, Person, Place, Situation, Self    Patient Cognition: Alert    Hospitalization in the last 30 days (Readmission):  No    If yes, Readmission Assessment in  Navigator will be completed.    Advance Directives:      Code Status: Full Code   Patient's Primary Decision Maker is: Legal Next of Kin      Discharge Planning:    Patient lives with: Alone Type of Home: Apartment  Primary Care Giver: Self  Patient Support Systems include: Parent, Family Members, Friends/Neighbors   Current Financial resources:    Current community resources:    Current services prior to admission: None            Current DME:              Type of Home Care services:  None    ADLS  Prior functional level: Independent in ADLs/IADLs  Current functional level: Independent in ADLs/IADLs    PT AM-PAC:   /24  OT AM-PAC:   /24    Family can provide assistance at DC: Yes  Would you like Case Management to discuss the discharge plan with any other family members/significant others, and if so, who? No  Plans to Return to Present Housing: Unknown at present  Other Identified Issues/Barriers to RETURNING to current housing: medical clearance  Potential  Assistance needed at discharge: N/A            Potential DME:    Patient expects to discharge to: Apartment  Plan for transportation at discharge:      Financial    Payor: [facility] / Plan: [facility] [employer]EV[employer] / Product Type: *No Product type* /     Does insurance require precert for SNF: Yes    Potential assistance Purchasing Medications: No  Meds-to-Beds request:        [facility] [id number] - [place], [place] - [address] [place] [address] -  [phone] - F [phone] [address] [facility] [address]  Phone: [phone] Fax: [phone]      Notes:    Factors facilitating achievement of predicted outcomes: Family support, Cooperative, and Pleasant    Barriers to discharge: Medical complications and Medication managment    Additional Case Management Notes:   Met with patient at bedside.   Patient admitted to hospital on 1/10/25 for AFIB with RVR.   Patient is coming from apartment where he lives alone.   Current services include none.  Discharge plan is to return back home.   Transportation plan at discharge is family/self.   Family support consists of parent.   Case management will continue to follow through hospital stay.      The Plan for Transition of Care is related to the following treatment goals of Atrial fibrillation with rapid ventricular response (HCC) [I48.91]  Atrial fibrillation, unspecified type (HCC) [I48.91]    IF APPLICABLE: The Patient and/or patient representative Gabriel and his family were provided with a choice of provider and agrees with the discharge plan. Freedom of choice list with basic dialogue that supports the patient's individualized plan of care/goals and shares the quality data associated with the providers was provided to: Patient   Patient Representative Name:       The Patient and/or Patient Representative Agree with the Discharge Plan? Yes    JOSEFINA Pittman, RN    [facility]  Ph: [phone]  Fax: [phone]

## 2025-01-10 NOTE — ED PROVIDER NOTES
[facility]  EMERGENCY DEPARTMENT ENCOUNTER          EM RESIDENT NOTE     Date of evaluation: 1/9/2025    Chief Complaint     Tachycardia (Patient brought in by ems for SVT- EMS administerd adenosine x2 with no response- complaints of \"chest pain on ems arrival that has subsided at this time\")    History of Present Illness     Gabriel Maya is a 44 y.o. male with PMHx of HTN, atrial fibrillation on warfarin, congestive heart failure with an EF of 25% who presents to the emergency department for evaluation of tachycardia.  Per report patient was at home when he felt sudden onset of palpitations and chest pressure.  Patient reports he called EMS and they report that their initial EKG was concerning for SVT and therefore they gave adenosine 6 mg x 2 without improvement in heart rate.  On arrival to the emergency department patient's heart rate is in the 130s and he reports some improvement in his chest discomfort.  He denies any recent illnesses or infectious symptoms.  He reports that he has not taken his warfarin for the past several days and some of his other medications however he does report compliance with his home metoprolol.  He denies any shortness of breath.    Past Medical, Surgical, Family, and Social History     He has a past medical history of Atrial fibrillation (HCC), History of bipolar disorder & PTSD, Hypertension, and Mixed hyperlipidemia.  He has a past surgical history that includes Nicholson tooth extraction and Inguinal hernia repair (Bilateral).  His He was adopted. Family history is unknown by patient.  He reports that he has never smoked. He has never used smokeless tobacco. He reports that he does not currently use alcohol. He reports that he does not use drugs.    Medications     Previous Medications    AMIODARONE (CORDARONE) 200 MG TABLET    Take 1 tablet by mouth daily    FUROSEMIDE (LASIX) 40 MG TABLET    Take 1 tablet by mouth 2 times daily    LOSARTAN (COZAAR) 25 MG TABLET       Troponin Now and Q 1 Hour   Result Value Ref Range    Troponin, High Sensitivity 7 0 - 22 ng/L   BMP w/ Reflex to MG   Result Value Ref Range    Sodium 137 136 - 145 mmol/L    Potassium reflex Magnesium 4.3 3.5 - 5.1 mmol/L    Chloride 99 99 - 110 mmol/L    CO2 24 21 - 32 mmol/L    Anion Gap 14 3 - 16    Glucose 127 (H) 70 - 99 mg/dL    BUN 17 7 - 20 mg/dL    Creatinine 1.3 0.9 - 1.3 mg/dL    Est, Glom Filt Rate 68 >60    Calcium 9.8 8.3 - 10.6 mg/dL   CBC with Auto Differential   Result Value Ref Range    WBC 13.8 (H) 4.0 - 11.0 K/uL    RBC 5.54 4.20 - 5.90 M/uL    Hemoglobin 17.7 (H) 13.5 - 17.5 g/dL    Hematocrit 51.6 40.5 - 52.5 %    MCV 93.1 80.0 - 100.0 fL    MCH 32.0 26.0 - 34.0 pg    MCHC 34.3 31.0 - 36.0 g/dL    RDW 13.6 12.4 - 15.4 %    Platelets 282 135 - 450 K/uL    MPV 9.5 5.0 - 10.5 fL    Neutrophils % 70.8 %    Lymphocytes % 19.8 %    Monocytes % 8.3 %    Eosinophils % 0.4 %    Basophils % 0.7 %    Neutrophils Absolute 9.8 (H) 1.7 - 7.7 K/uL    Lymphocytes Absolute 2.7 1.0 - 5.1 K/uL    Monocytes Absolute 1.1 0.0 - 1.3 K/uL    Eosinophils Absolute 0.0 0.0 - 0.6 K/uL    Basophils Absolute 0.1 0.0 - 0.2 K/uL   Brain Natriuretic Peptide   Result Value Ref Range    NT Pro- 0 - 124 pg/mL     EKG   Interpreted in conjunction with emergencydepartment physician Page Gutierrez MD  Rhythm: atrial fibrillation - rapid  Rate: tachycardia  Axis: normal  Ectopy: none  Conduction: normal  ST Segments: normal  T Waves:no acute change  Q Waves: none  Clinical Impression: atrial fibrillation (chronic), with RVR    ED BEDSIDE ULTRASOUND:  No results found.    RECENT VITALS:  BP: (!) 141/69, Temp: 98.8 °F (37.1 °C), Pulse: (!) 125,Respirations: 14, SpO2: 99 %     Procedures     Procedures    MEDICAL DECISION MAKING / ASSESSMENT / PLAN     INITIAL VITALS: BP: (!) 147/98, Temp: 98.8 °F (37.1 °C), Pulse: (!) 157, Respirations: 18, SpO2: 94 %    Gabriel Maya is a 44 y.o. male with PMHx as mentioned in the HPI

## 2025-01-10 NOTE — PLAN OF CARE
Problem: Chronic Conditions and Co-morbidities  Goal: Patient's chronic conditions and co-morbidity symptoms are monitored and maintained or improved  1/10/2025 1758 by Анна Almeida RN  Outcome: Progressing  Flowsheets (Taken 1/10/2025 0605 by Jared Jama, RN)  Care Plan - Patient's Chronic Conditions and Co-Morbidity Symptoms are Monitored and Maintained or Improved:   Monitor and assess patient's chronic conditions and comorbid symptoms for stability, deterioration, or improvement   Collaborate with multidisciplinary team to address chronic and comorbid conditions and prevent exacerbation or deterioration   Update acute care plan with appropriate goals if chronic or comorbid symptoms are exacerbated and prevent overall improvement and discharge     Problem: Discharge Planning  Goal: Discharge to home or other facility with appropriate resources  1/10/2025 1758 by Анна Almeida RN  Outcome: Progressing  Flowsheets (Taken 1/10/2025 0605 by Jared Jama, RN)  Discharge to home or other facility with appropriate resources:   Refer to discharge planning if patient needs post-hospital services based on physician order or complex needs related to functional status, cognitive ability or social support system   Identify discharge learning needs (meds, wound care, etc)   Identify barriers to discharge with patient and caregiver   Arrange for needed discharge resources and transportation as appropriate   Arrange for interpreters to assist at discharge as needed     Problem: ABCDS Injury Assessment  Goal: Absence of physical injury  1/10/2025 1758 by Анна Almeida, RN  Outcome: Progressing  Flowsheets (Taken 1/10/2025 0605 by Jared Jama, RN)  Absence of Physical Injury: Implement safety measures based on patient assessment     Problem: Cardiovascular - Adult  Goal: Maintains optimal cardiac output and hemodynamic stability  1/10/2025 1758 by Анна Almeida, BARBARA  Outcome:  Progressing  Flowsheets (Taken 1/10/2025 0605 by Jared Jama RN)  Maintains optimal cardiac output and hemodynamic stability:   Administer vasoactive medications as ordered   For PPHN infants, administer sedation as ordered and minimize all controllable stressors.   Administer fluid and/or volume expanders as ordered   Assess for signs of decreased cardiac output   Monitor urine output and notify Licensed Independent Practitioner for values outside of normal range   Monitor blood pressure and heart rate

## 2025-01-10 NOTE — PROGRESS NOTES
AFRVR  Disposition  - Preadmission: home  - Current: PCU  - Upon discharge: ?      Will discuss with attending physician Mary Lynch MD.     Jamie Caputo DO, PGY-1  Internal Medicine Resident  Contact via [employer]

## 2025-01-10 NOTE — PROGRESS NOTES
[facility] -  Clinical Pharmacy Note    Warfarin - Management by Pharmacy    Consult Date(s): 1/10/24  Consulting Provider(s): Dr. Holcomb    Assessment / Plan  Anticoagulatio - AFib - Warfarin  Goal INR: 2 - 3  Concurrent Anticoagulants / Antiplatelets:   On Enoxaparin 30mg subQ BID -- recommend to d/c when INR > 2 and stable  Interactions:   Amiodarone - can significantly increase INR, though pt was previously on this medication (unsure when he stopped). Will monitor closely.   Current Regimen / Plan:   INR today = 1.21 - as expected as pt reports he stopped taking Warfarin recently as he ran out of medication  Will continue most recent home dose of Warfarin 5mg daily except for 2.5mg on Tues/Thur  Will monitor closely - now on amiodarone gtt (though pt has been on in the past / unsure when this was stopped).   Recommend to d/c enoxaparin when INR > 2  Will monitor pt's clinical status and INR daily, and make dose adjustments as needed.    Please call with questions--  Marily Hugo PharmD, [employer]  Wireless: [phone]   1/10/2025 7:44 AM    Addendum:   Spoke with patient re: home medications. He ran out of amiodarone ~3 days ago, but has been taking prior to this. On amiodarone 200mg po daily.  Ran out of warfarin ~10 days ago -- was taking 5mg daily except for 2.5mg on Mon/Wed/Fri (slightly different dosing vs last clinic visit instructions).   No change in plan today - will give Warfarin 5mg tonight.  Please call with questions--  Marily Hugo PharmD, BCPS  Wireless: [phone]   1/10/2025 11:22 AM        Interval update:   On amiodarone gtt.    Subjective/Objective:   Gabriel Maya is a 44 y.o. male with a PMHx significant for persistent AFib, HFrEF, bipolar disorder, PTSD who presented to ED with chest pain. Of note, on admit pt stated he recently ran out of Warfarin.    Pharmacy is consulted to dose Warfarin.    Ht Readings from Last 1 Encounters:   01/09/25 1.753 m (5' 9\")     Wt Readings from  Last 1 Encounters:   01/10/25 (!) 144.3 kg (318 lb 2 oz)     Prior / Home Warfarin Regimen:  Goal INR 2-3  Of note, pt states he recently ran out of Warfarin (has not been taking)  Managed by [facility] - last appt was 12/12  At that time, INR was 2.5 - continued Warfarin 5mg daily except for 2.5mg on Tues/Thur    Date INR Warfarin   1/9 0.97    1/10 1.21                  Recent Labs     01/09/25  2300 01/10/25  0515   INR 0.97 1.21*   HGB 17.7* 16.8    283   CREATININE 1.3 1.5*

## 2025-01-10 NOTE — CONSULTS
Clinical Pharmacy Consult Note  Medication History     Admit Date: 1/9/2025    Pharmacy consulted to verify home medication list by Dr. Johnson.    List of current medications patient is taking is complete. Home Medication list in EPIC updated to reflect changes noted below.    Source of information: interview with patient; verified doses with [facility]    Patient's home pharmacy: [facility]     Changes made to medication list:   Other notes:   Warfarin - per patient, most recent dose was 5mg daily except for 2.5mg on Mon/Wed/Fri. Has been out of medication for at least the past 10 days  Amiodarone - pt has been out of medication for the past 3 days  Furosemide - prescribed as 40mg po BID; pt has been taking 80mg daily   Metoprolol XL - prescribed as 150mg po daily (50mg tabs x 3 tab/day) but patient has been taking 1 tab daily   Pt states he takes all of his medications daily as he didn't realize he was supposed to be taking some of his medications more than 1x daily    Current Outpatient Medications   Medication Instructions    amiodarone (CORDARONE) 200 mg, Oral, DAILY    furosemide (LASIX) Taking 80mg po daily (prescribed as 40mg BID)    losartan (COZAAR) 25 mg, Oral, DAILY    metoprolol succinate (TOPROL XL) Taking 50mg po daily (prescribed as 150mg po daily)     spironolactone (ALDACTONE) 25 mg, Oral, DAILY    warfarin (COUMADIN) 5 MG tablet Take 2.5 mg (half tablet) every Mon, Wed, Fri; 5 mg (full tablet) all other days or as directed by clinic       Please call with questions--  Marily Hugo PharmD, [facility]  Wireless: [phone]   1/10/2025 11:19 AM

## 2025-01-10 NOTE — ED PROVIDER NOTES
ED Attending Attestation Note     Date of evaluation: 1/9/2025    This patient was seen by the resident.  I have seen and examined the patient, agree with the workup, evaluation, management and diagnosis. The care plan has been discussed.  I have reviewed the ECG and concur with the resident's interpretation.  My assessment reveals a 44-year-old male who presents with a chief complaint of tachycardia.  Patient with a history of \"SVT \"although on chart review he has A-fib RVR and is chronically on warfarin as well as a beta-blocker, for amiodarone.  Has not been taking his medications.  Presents with an atrial tachycardia with rates up to the 160s.  Stable blood pressures.  Patient with no chest pain. On exam is tachycardic but otherwise well appearing.      CRITICAL CARE TIME    Upon my evaluation, this patient had a critical illness or injury that acutely impaired one or more vital organ systems such that there was a high probability of imminent or life threatening deterioration without intervention. In this patient that illness with a high probability of imminent or life threatening deterioration was tachycardia (atrial fibrillation) requiring amiodarone continuous infusion, which required my direct attention, intervention, and personal management.     I have personally provided 55 minutes of critical care time exclusive of separately billed procedures and treating other patients.  Critical care was necessary to treat or prevent imminent or life threatening deterioration of the following condition(s):  as above .     Critical care time was spent personally by me on the following activities: This critical care time included obtaining a history; examining the patient; pulse oximetry; ordering and review of studies including CXR, blood gases; arranging urgent treatment with development of a management plan; evaluation of patient's response to treatment; frequent reassessment; and, discussions with other providers

## 2025-01-10 NOTE — H&P
Internal Medicine H&P    Date:   2025   Patient:  Gabriel Maya   :   1980     CC:  Tachycardia (Patient brought in by ems for SVT- EMS administerd adenosine x2 with no response- complaints of \"chest pain on ems arrival that has subsided at this time\")       Source of HPI: patient, chart review    Subjective     HPI:  Mr. Gabriel Maya is a 44 y.o. male with a history of Afib RVR s/p failed DC cardioversion x3 in March now with persistent afib, HFrEF 2/2 NICM, bipolar disorder, PTSD, severe central sleep apnea who presents with chest discomfort.    Patient initially presented after sudden onset of right lower chest pain followed by difficulty breathing.  He has a significant history of A-fib RVR, and states that this current episode is similar to the last one.  No known inciting events.  Not recently ill.  No changes in his diet.  On evaluation currently, denies chest pain, shortness of breath, headaches, lower extremity edema.    Past medical history significant for new diagnosis of A-fib in .  He has been on maintenance amiodarone and metoprolol for rate and rhythm control, however he is undergone 3 separate DC cardioversions.  TTE in March revealed severely reduced ejection fraction of 20 to 25% with global hypokinesis, indeterminate diastolic function due to A-fib.  Underwent cardiac catheterization. TSH normal. Additionally, underwent polysomnography on 2024, which revealed central sleep apnea, patient has not been set up with CPAP at this time.    Patient states that he does not smoke, drink alcohol, or use recreational drugs.  He states that he takes his warfarin intermittently, and had recently run out.  Additionally of note, he had previously been without health insurance, which precluded him from certain medications including Entresto and Jardiance, but now has health insurance.    Discussed CODE STATUS.  Patient wishes to be full code    ED Course:  On arrival to the  by Theodore Andre MD            Assessment & Plan   Mr. Gabriel Maya is a 44 y.o. male with a history of Afib RVR s/p failed DC cardioversion x2 in March now with persistent afib, HFrEF 2/2 NICM, bipolar disorder, PTSD who presents with chest discomfort and found to be in afib RVR. The following issues will be addressed during this hospitalization:    # Afib with RVR  Failed multiple DC cardioversions in the past, now with afib on amiodarone 200 mg daily and metoprolol (takes less than prescribed). Started on amiodarone gtt in the ED. BP within acceptable range and has been stable. Unclear cause, although prior episodes seem to be triggered by GI illness. He will require further optimization of his rate and rhythm control at home, however of note, he had been taking only 1/3 of prescribed Toprol dose.  - Cardiology consult  - Pharmacy consulted to dose warfarin goal INR 2.0-3.0   - Consider eliquis at discharge, given recently improved access  - Continue amiodarone gtt at 1 mg / min, transition to 0.5 mg / min at 0815  - Consider ablation    # HF with severely reduced EF, NICM  EF 20-25% from TTE March, 2024. Continue home GDMT. Of note, does not take Entresto or Jardiance due to cost concern (previously did not have insurance). He does now, however currently have insurance documented in chart as of November, 2024, may need to reconsider his current GDMT.  - Continue home losartan, metoprolol, spironolactone  - Consider restarting ARNI and SGLTi depending on current insurance . Appreciate cardiology input  - Pt euvolemic at this time, hold home lasix    # Severe sleep apnea  AHI 79.3 from polysomnography in August, 2024 with severe central sleep apnea. Patient will likely require CPAP in OP setting    # Leukocytosis - likely stress reaction 2/2 afib RVR. Will continue to trend CBC    # CKD - Cr 1.3 on presentation, recent baseline 1.0-1.4  - Daily RFP    Chronic medical conditions:  Bipolar disorder / PTSD -

## 2025-01-10 NOTE — ED TRIAGE NOTES
Patient brought in by ems for SVT- EMS administerd adenosine x2 with no response- complaints of \"chest pain on ems arrival that has subsided at this time\"

## 2025-01-10 NOTE — PROGRESS NOTES
4 Eyes Skin Assessment     NAME:  Gabriel Maya  YOB: 1980  MEDICAL RECORD NUMBER:  [id number]    The patient is being assessed for  Admission    I agree that at least one RN has performed a thorough Head to Toe Skin Assessment on the patient. ALL assessment sites listed below have been assessed.      Areas assessed by both nurses:    Head, Face, Ears, Shoulders, Back, Chest, Arms, Elbows, Hands, Sacrum. Buttock, Coccyx, Ischium, Legs. Feet and Heels, and Under Medical Devices         Does the Patient have a Wound? No noted wound(s)       Samy Prevention initiated by RN: No  Wound Care Orders initiated by RN: No    Pressure Injury (Stage 3,4, Unstageable, DTI, NWPT, and Complex wounds) if present, place Wound referral order by RN under : No    New Ostomies, if present place, Ostomy referral order under : No     Nurse 1 eSignature: Electronically signed by ARABELLA BADILLO RN on 1/10/25 at 5:03 AM EST    **SHARE this note so that the co-signing nurse can place an eSignature**    Nurse 2 eSignature: Electronically signed by Jackson Bai RN on 1/10/25 at 6:42 AM EST

## 2025-01-10 NOTE — CONSULTS
[facility] -  Clinical Pharmacy Note    Warfarin - Management by Pharmacy    Consult Date(s): 01/10/25  Consulting Provider(s): Dr. Holcomb    Assessment / Plan  A-Fib/A-Flutter - Warfarin  Goal INR: 2 - 3  Concurrent Anticoagulants / Antiplatelets:   Enoxaparin 30mg BID for 5 days (Bridge)  Interactions: amiodarone (incr INR)  Current Regimen / Plan:   Patient is currently subtherapeutic (INR 0.97 on 01/09/25)  Will bridge with enoxaparin for 5 days  Daily PT/INR's x 3 days  Resume current home regimen   2.5mg Mon/Wed/Fri  5mgTues/Thur/Sat/Sun  Will monitor pt's clinical status and INR daily, and make dose adjustments as needed.    Thank you for consulting pharmacy,    Santo Aguilar, [facility] ext [phone]        Interval update:  therapy initiation     Subjective/Objective:   Gabriel Maya is a 44 y.o. male with a PMHx significant for Atrial fibrillation (HCC), History of bipolar disorder & PTSD, Hypertension, and Mixed hyperlipidemia who is admitted with tachycardia.     Pharmacy is consulted to dose warfarin.    Ht Readings from Last 1 Encounters:   01/09/25 1.753 m (5' 9\")     Wt Readings from Last 1 Encounters:   01/09/25 (!) 143.3 kg (315 lb 14.4 oz)     Prior / Home Warfarin Regimen:  Warfarin 2.5mg Mon/Wed/Fri  Warfarin 5mg Tues/Thur/Sat/Sun    Date INR Warfarin                           Recent Labs     01/09/25  2300   INR 0.97   HGB 17.7*      CREATININE 1.3

## 2025-01-10 NOTE — PLAN OF CARE
Problem: Chronic Conditions and Co-morbidities  Goal: Patient's chronic conditions and co-morbidity symptoms are monitored and maintained or improved  Outcome: Progressing  Flowsheets (Taken 1/10/2025 0605)  Care Plan - Patient's Chronic Conditions and Co-Morbidity Symptoms are Monitored and Maintained or Improved:   Monitor and assess patient's chronic conditions and comorbid symptoms for stability, deterioration, or improvement   Collaborate with multidisciplinary team to address chronic and comorbid conditions and prevent exacerbation or deterioration   Update acute care plan with appropriate goals if chronic or comorbid symptoms are exacerbated and prevent overall improvement and discharge     Problem: Discharge Planning  Goal: Discharge to home or other facility with appropriate resources  Outcome: Progressing  Flowsheets (Taken 1/10/2025 0605)  Discharge to home or other facility with appropriate resources:   Refer to discharge planning if patient needs post-hospital services based on physician order or complex needs related to functional status, cognitive ability or social support system   Identify discharge learning needs (meds, wound care, etc)   Identify barriers to discharge with patient and caregiver   Arrange for needed discharge resources and transportation as appropriate   Arrange for interpreters to assist at discharge as needed     Problem: ABCDS Injury Assessment  Goal: Absence of physical injury  Outcome: Progressing  Flowsheets (Taken 1/10/2025 0605)  Absence of Physical Injury: Implement safety measures based on patient assessment     Problem: Cardiovascular - Adult  Goal: Maintains optimal cardiac output and hemodynamic stability  Outcome: Progressing  Flowsheets (Taken 1/10/2025 0605)  Maintains optimal cardiac output and hemodynamic stability:   Administer vasoactive medications as ordered   For PPHN infants, administer sedation as ordered and minimize all controllable stressors.

## 2025-01-10 NOTE — CONSULTS
[facility]   Cardiac Electrophysiology Consultation   Date: 1/10/2025  Admit Date:  1/9/2025  Reason for Consultation: Afib  Consult Requesting Physician: Mary Johnson MD     Chief Complaint   Patient presents with    Tachycardia     Patient brought in by ems for SVT- EMS administerd adenosine x2 with no response- complaints of \"chest pain on ems arrival that has subsided at this time\"     HPI: Gabriel Maya is a 44 y.o. gentleman with a past medical history significant for persistent atrial fibrillation, failed cardioversion and antiarrhythmic therapy, nonischemic cardiomyopathy, LV ejection fraction 20 to 25%, morbid obesity, BMI 47, severe obstructive sleep apnea, not using CPAP history of bipolar disorder, hyperlipidemia was admitted to the hospital secondary to right-sided chest/hypochondrial region discomfort while he was sitting and watching television.  His friend told him to call EMS and eventually, he called EMS and came to the hospital.  He was noted to be in A-fib with RVR.  Patient is being admitted for further evaluation and management.    6 months ago, he was admitted to the hospital with similar issues.  At the time, he underwent LINH and cardioversion.  However, unfortunately he had early recurrence of atrial fibrillation.  Initially he was on Xarelto.  However, secondary to financial reasons he has been switched to warfarin.  Patient endorses that he is not taking his medications regularly.  His INR is 1.2.  He is not sure whether he is taking amiodarone as prescribed or not.    EKG shows atrial fibrillation with suboptimal ventricular rate control    Telemetry shows atrial fibrillation    Impression:  Persistent atrial fibrillation  Nonischemic cardiomyopathy, LV ejection fraction 20 to 25%, chronic  Morbid obesity  Obstructive sleep apnea    Recommendations:  Secondary to atrial fibrillation, leading onto nonischemic cardiomyopathy, preferred to have rhythm control

## 2025-01-10 NOTE — ED NOTES
Patient Name: Gabriel Maya  : 1980 44 y.o.  MRN: [id number]  ED Room #: 2TR/2[facility]-A2     Chief complaint:   Chief Complaint   Patient presents with    Tachycardia     Patient brought in by ems for SVT- EMS administerd adenosine x2 with no response- complaints of \"chest pain on ems arrival that has subsided at this time\"     Hospital Problem/Diagnosis:       O2 Flow Rate:    (if applicable)  Cardiac Rhythm:   (if applicable)  Active LDA's:   Peripheral IV 25 Left Antecubital (Active)       Peripheral IV 25 Posterior;Right Hand (Active)            How does patient ambulate? Stand by assist    2. How does patient take pills? Unknown, no oral medications were given in the Emergency Department    3. Is patient alert? Alert    4. Is patient oriented? To Person, To Place, To Time, To Situation, and Follows Commands    5.   Patient arrived from:  home  Facility Name: ___________________________________________    6. If patient is disoriented or from a Skill Nursing Facility has family been notified of admission? No    7. Patient belongings? Belongings: Cell Phone and Clothing    Disposition of belongings? Kept with Patient     8. Any specific patient or family belongings/needs/dynamics?   a.     9. Miscellaneous comments/pending orders?    Amiodarone gtt infusing    If there are any additional questions please reach out to the Emergency Department.      Luci Pink RN  01/10/25 0238

## 2025-01-11 VITALS
OXYGEN SATURATION: 98 % | BODY MASS INDEX: 46.65 KG/M2 | WEIGHT: 315 LBS | SYSTOLIC BLOOD PRESSURE: 126 MMHG | HEIGHT: 69 IN | DIASTOLIC BLOOD PRESSURE: 75 MMHG | RESPIRATION RATE: 18 BRPM | HEART RATE: 75 BPM | TEMPERATURE: 98 F

## 2025-01-11 PROBLEM — I48.19 PERSISTENT ATRIAL FIBRILLATION (HCC): Status: ACTIVE | Noted: 2025-01-11

## 2025-01-11 LAB
ALBUMIN SERPL-MCNC: 4.4 G/DL (ref 3.4–5)
ANION GAP SERPL CALCULATED.3IONS-SCNC: 9 MMOL/L (ref 3–16)
BASOPHILS # BLD: 0.1 K/UL (ref 0–0.2)
BASOPHILS NFR BLD: 0.9 %
BUN SERPL-MCNC: 15 MG/DL (ref 7–20)
CALCIUM SERPL-MCNC: 9.9 MG/DL (ref 8.3–10.6)
CHLORIDE SERPL-SCNC: 99 MMOL/L (ref 99–110)
CO2 SERPL-SCNC: 29 MMOL/L (ref 21–32)
CREAT SERPL-MCNC: 1.2 MG/DL (ref 0.9–1.3)
DEPRECATED RDW RBC AUTO: 13.7 % (ref 12.4–15.4)
EOSINOPHIL # BLD: 0.1 K/UL (ref 0–0.6)
EOSINOPHIL NFR BLD: 0.9 %
GFR SERPLBLD CREATININE-BSD FMLA CKD-EPI: 75 ML/MIN/{1.73_M2}
GLUCOSE SERPL-MCNC: 98 MG/DL (ref 70–99)
HCT VFR BLD AUTO: 49.2 % (ref 40.5–52.5)
HGB BLD-MCNC: 16.5 G/DL (ref 13.5–17.5)
INR PPP: 0.98 (ref 0.85–1.15)
LYMPHOCYTES # BLD: 2.1 K/UL (ref 1–5.1)
LYMPHOCYTES NFR BLD: 21.3 %
MAGNESIUM SERPL-MCNC: 2.18 MG/DL (ref 1.8–2.4)
MCH RBC QN AUTO: 31.7 PG (ref 26–34)
MCHC RBC AUTO-ENTMCNC: 33.6 G/DL (ref 31–36)
MCV RBC AUTO: 94.5 FL (ref 80–100)
MONOCYTES # BLD: 1.1 K/UL (ref 0–1.3)
MONOCYTES NFR BLD: 11.2 %
NEUTROPHILS # BLD: 6.6 K/UL (ref 1.7–7.7)
NEUTROPHILS NFR BLD: 65.7 %
PHOSPHATE SERPL-MCNC: 4 MG/DL (ref 2.5–4.9)
PLATELET # BLD AUTO: 242 K/UL (ref 135–450)
PMV BLD AUTO: 9.8 FL (ref 5–10.5)
POTASSIUM SERPL-SCNC: 4.2 MMOL/L (ref 3.5–5.1)
PROTHROMBIN TIME: 13.2 SEC (ref 11.9–14.9)
RBC # BLD AUTO: 5.21 M/UL (ref 4.2–5.9)
SODIUM SERPL-SCNC: 137 MMOL/L (ref 136–145)
WBC # BLD AUTO: 10.1 K/UL (ref 4–11)

## 2025-01-11 PROCEDURE — 6370000000 HC RX 637 (ALT 250 FOR IP)

## 2025-01-11 PROCEDURE — 85610 PROTHROMBIN TIME: CPT

## 2025-01-11 PROCEDURE — 2500000003 HC RX 250 WO HCPCS

## 2025-01-11 PROCEDURE — 6360000002 HC RX W HCPCS

## 2025-01-11 PROCEDURE — 99233 SBSQ HOSP IP/OBS HIGH 50: CPT | Performed by: NURSE PRACTITIONER

## 2025-01-11 PROCEDURE — 80069 RENAL FUNCTION PANEL: CPT

## 2025-01-11 PROCEDURE — 83735 ASSAY OF MAGNESIUM: CPT

## 2025-01-11 PROCEDURE — 85025 COMPLETE CBC W/AUTO DIFF WBC: CPT

## 2025-01-11 PROCEDURE — 36415 COLL VENOUS BLD VENIPUNCTURE: CPT

## 2025-01-11 RX ORDER — AMIODARONE HYDROCHLORIDE 200 MG/1
400 TABLET ORAL 2 TIMES DAILY
Status: DISCONTINUED | OUTPATIENT
Start: 2025-01-11 | End: 2025-01-11 | Stop reason: HOSPADM

## 2025-01-11 RX ORDER — AMIODARONE HYDROCHLORIDE 200 MG/1
TABLET ORAL
Qty: 90 TABLET | Refills: 1 | Status: SHIPPED | OUTPATIENT
Start: 2025-01-11

## 2025-01-11 RX ORDER — FUROSEMIDE 40 MG/1
40 TABLET ORAL DAILY
Qty: 30 TABLET | Refills: 1 | Status: SHIPPED | OUTPATIENT
Start: 2025-01-11

## 2025-01-11 RX ORDER — SPIRONOLACTONE 25 MG/1
25 TABLET ORAL DAILY
Qty: 30 TABLET | Refills: 1 | Status: SHIPPED | OUTPATIENT
Start: 2025-01-11

## 2025-01-11 RX ORDER — LOSARTAN POTASSIUM 25 MG/1
25 TABLET ORAL DAILY
Qty: 30 TABLET | Refills: 1 | Status: SHIPPED | OUTPATIENT
Start: 2025-01-11

## 2025-01-11 RX ORDER — METOPROLOL SUCCINATE 100 MG/1
150 TABLET, EXTENDED RELEASE ORAL DAILY
Qty: 50 TABLET | Refills: 1 | Status: SHIPPED | OUTPATIENT
Start: 2025-01-11

## 2025-01-11 RX ORDER — AMIODARONE HYDROCHLORIDE 400 MG/1
400 TABLET ORAL 2 TIMES DAILY
Qty: 60 TABLET | Refills: 0 | Status: CANCELLED | OUTPATIENT
Start: 2025-01-11 | End: 2025-02-10

## 2025-01-11 RX ORDER — WARFARIN SODIUM 5 MG/1
TABLET ORAL
Qty: 90 TABLET | Refills: 1 | Status: SHIPPED | OUTPATIENT
Start: 2025-01-11

## 2025-01-11 RX ADMIN — ENOXAPARIN SODIUM 30 MG: 100 INJECTION SUBCUTANEOUS at 09:22

## 2025-01-11 RX ADMIN — METOPROLOL SUCCINATE 150 MG: 100 TABLET, EXTENDED RELEASE ORAL at 09:24

## 2025-01-11 RX ADMIN — SPIRONOLACTONE 25 MG: 25 TABLET ORAL at 09:21

## 2025-01-11 RX ADMIN — LOSARTAN POTASSIUM 25 MG: 25 TABLET, FILM COATED ORAL at 09:20

## 2025-01-11 RX ADMIN — AMIODARONE HYDROCHLORIDE 400 MG: 200 TABLET ORAL at 09:21

## 2025-01-11 RX ADMIN — SODIUM CHLORIDE, PRESERVATIVE FREE 10 ML: 5 INJECTION INTRAVENOUS at 09:22

## 2025-01-11 NOTE — DISCHARGE SUMMARY
Discharge note: Patient has been seen by doctor. Discharge order obtained, and discharge instructions reviewed. Patient educated, using the teach back method, about follow up instructions and discharge instructions. A completed copy of the AVS instructions given to patient and all questions answered. IV catheter removed without complaints, catheter intact, site WNL. Discharged to lobby via wheel chair per transportation .Pt was taken in [employer], name and address confirmed with [employer] .

## 2025-01-11 NOTE — PROGRESS NOTES
[facility] -  Clinical Pharmacy Note    Warfarin - Management by Pharmacy    Consult Date(s): 1/10/24  Consulting Provider(s): Dr. Holcomb    Assessment / Plan  Anticoagulatio - AFib - Warfarin  Goal INR: 2 - 3  Concurrent Anticoagulants / Antiplatelets:   On Enoxaparin 30mg subQ BID -- recommend to d/c when INR > 2 and stable  Interactions:   Amiodarone - can significantly increase INR.  Pt was on Amiodarone at home; ran out ~3 days prior to admission.  Given long half life, wouldn't expect significant interaction now.  Current Regimen / Plan:   Pt reports running out of his medication ~10 days prior to admission - explains subtherapeutic INR.  INR today = 0.98  Will continue regimen recommended at last clinic visit:  Warfarin 5mg on Xbj-Idw-Sck-Sat-Sun + 2.5mg on Tue & Thur.  Scheduled to get 5mg this evening.  It will likely take 5-7 days to reach therapeutic INR again since pt was off Warfarin x10 days prior to admission.  Recommend to d/c enoxaparin when INR > 2  Will monitor pt's clinical status and INR daily, and make dose adjustments as needed.    Please call with questions--  Thanks--  Elissa Bryant, PharmD, BCPS, [facility]  [id number] ([facility])   1/11/2025 9:55 AM  _______________________________________________________________________________    Interval update:  Oral Amiodarone resumed.  INR remains subtherapeutic (as expected given not taking Warfarin in 10 days prior to admission).    Subjective/Objective:   Gabriel Maya is a 44 y.o. male with a PMHx significant for persistent AFib, HFrEF, bipolar disorder, PTSD who presented to ED with chest pain.  Being treated for A.fib with RVR.  Of note, on admit pt stated he recently ran out of Warfarin.    Pharmacy is consulted to dose Warfarin.    Ht Readings from Last 1 Encounters:   01/09/25 1.753 m (5' 9\")     Wt Readings from Last 1 Encounters:   01/11/25 (!) 143.1 kg (315 lb 7.7 oz)     Prior / Home Warfarin Regimen:  Goal INR 2-3  Of note,

## 2025-01-11 NOTE — DISCHARGE INSTRUCTIONS
Please follow-up with your primary care physician in 1 week as a hospital follow-up.  Please call their office to make an appointment  Follow-up with your cardiologist in 1 month.  Please call their office to make an appointment.  A couple changes have been made to your medications.  Please take all medications as prescribed   Please take amiodarone 200 mg.  You will take this medication-2 tablets twice a day for 2 weeks and then 1 tablet once daily after that.    Please take Lasix 40 mg once a day.  Please take metoprolol 100 mg extended release tablet-1-1/2 tablets daily  Please take Coumadin -take 2.5 mg half a tablet every Tuesday and Thursday and a full tablet which is 5 mg all other days  Please STOP taking ibuprofen 600 mg.  Continue to take losartan 25 mg once a daily by mouth  Continue to take spironolactone 25 mg once daily by mouth    If you experience any similar symptoms that you were having prior to coming to the hospital or any fevers chills or signs of infection please go to your nearest emergency department

## 2025-01-11 NOTE — CARE COORDINATION
Case Management Assessment            Discharge Note                    Date / Time of Note: 1/11/2025 3:08 PM                  Discharge Note Completed by: Nicole Holder    Patient Name: Gabriel Maya   YOB: 1980  Diagnosis: Atrial fibrillation with rapid ventricular response (HCC) [I48.91]  Atrial fibrillation, unspecified type (HCC) [I48.91]   Date / Time: 1/9/2025 10:51 PM    Current PCP: Joe Perla,   Clinic patient: No    Hospitalization in the last 30 days: No       Advance Directives:  Code Status: Full Code  [place] DNR form completed and on chart: Not Indicated    Financial:  Payor: [facility] [place] / Plan: [facility] BRONZE SILVER GOLD STEVEN / Product Type: *No Product type* /      Pharmacy:    [facility] [id number] [address] -  [phone] -  [phone] [address] [facility] [address]  Phone: [phone] Fax: [phone]      Assistance purchasing medications?: Potential Assistance Purchasing Medications: No  Assistance provided by Case Management: None at this time    Does patient want to participate in local refill/ meds to beds program?:      Meds To Beds General Rules:  1. Can ONLY be done Monday- Friday between 8:30am-5pm  2. Prescription(s) must be in pharmacy by 3pm to be filled same day  3.Copy of patient's insurance/ prescription drug card and patient face sheet must be sent along with the prescription(s)  4. Cost of Rx cannot be added to hospital bill. If financial assistance is needed, please contact unit  or ;  or  CANNOT provide pharmacy voucher for patients co-pays  5. Patients can then  the prescription on their way out of the hospital at discharge, or pharmacy can deliver to the bedside if staff is available. (payment due at time of pick-up or delivery - cash, check, or card accepted)     Able to afford home medications/ co-pay costs:

## 2025-01-11 NOTE — DISCHARGE SUMMARY
INTERNAL MEDICINE DEPARTMENT  DISCHARGE SUMMARY    Patient ID: Gabriel Maya                                             Discharge Date: 1/11/2025   Patient's PCP: Joe Perla DO                                          Discharge Physician: Dr. Alex MD  Admit Date: 1/9/2025   Admitting Physician: Rupa Banda MD    PROBLEMS DURING HOSPITALIZATION:  Present on Admission:   Atrial fibrillation with rapid ventricular response (HCC)      DISCHARGE DIAGNOSES:  A-fib with RVR, failed cardioversion  CKD, baseline creatinine 1.3  Morbid obesity  Obstructive sleep apnea    Hospital Course:    While in the hospital patient was placed on an amio drip which successfully treated the atrial fibrillation.  Electrophysiology was consulted and evaluated the patient and preferred that since his atrial fibrillation was secondary to nonischemic cardiomyopathy it was preferred that he has rhythm control strategy.  Unfortunately his INR was subtherapeutic due to him not taking his medications so he had been restarted on his warfarin.  Patient was continued on IV amiodarone for 2 days and then transition to amnio around 400 mg p.o. twice daily ..  Patient was also given the option of cardioversion once his INR was therapeutic.  However patient states that his wife is out of state and present in [place].  He prefers that she would be there before proceeding with any invasive procedures during the catheter ablation.  It was recommended on discharge that the patient go to follow-up with EP in 4 to 6 weeks.  On the day of discharge patient's heart rate was controlled much better with heart rate in the 90s.  Patient felt much better and was laying comfortably and was ready for discharge.  He was deemed stable for discharge.    Upon discharge patient was given instructions to take Amio 400 twice a day for 2 weeks and then 1 tablet of 200 amiodarone daily after that.  Patient was also told to continue Lasix 40 once a    Component Value Date/Time     01/11/2025 05:24 AM    K 4.2 01/11/2025 05:24 AM    K 4.3 01/09/2025 11:00 PM    CL 99 01/11/2025 05:24 AM    CO2 29 01/11/2025 05:24 AM    BUN 15 01/11/2025 05:24 AM    CREATININE 1.2 01/11/2025 05:24 AM    CALCIUM 9.9 01/11/2025 05:24 AM    PHOS 4.0 01/11/2025 05:24 AM       Consults: cardiology  Significant Diagnostic Studies:    XR CHEST PORTABLE   Final Result   1. No acute cardiopulmonary abnormalities.   2. Stable cardiomegaly       Treatments:  Amiodarone, warfarin, GDMT medications for HF    Disposition: home  Discharged Condition: Stable  Follow Up: Primary Care Physician in one week    DISCHARGE MEDICATION:     Medication List        CHANGE how you take these medications      amiodarone 200 MG tablet  Commonly known as: CORDARONE  2 tablets twice daily for 2 weeks and then 1 tablet once daily after that  What changed:   how much to take  how to take this  when to take this  additional instructions     furosemide 40 MG tablet  Commonly known as: Lasix  Take 1 tablet by mouth daily  What changed: when to take this     metoprolol succinate 100 MG extended release tablet  Commonly known as: TOPROL XL  Take 1.5 tablets by mouth daily  What changed: medication strength     warfarin 5 MG tablet  Commonly known as: COUMADIN  Take as directed. If you are unsure how to take this medication, talk to your nurse or doctor.  Original instructions: Take 2.5 mg (half tablet) every TUESDAY AND THURSDAY; 5 mg (full tablet) all other days or as directed by clinic  What changed: additional instructions            CONTINUE taking these medications      losartan 25 MG tablet  Commonly known as: COZAAR  Take 1 tablet by mouth daily     spironolactone 25 MG tablet  Commonly known as: ALDACTONE  Take 1 tablet by mouth daily            STOP taking these medications      ibuprofen 600 MG tablet  Commonly known as: ADVIL;MOTRIN               Where to Get Your Medications        These medications

## 2025-01-11 NOTE — TELEPHONE ENCOUNTER
Patient admitted to [facility] 1/9. Will f/u on discharge    Dana Leach, PharmD  [facility]  [place]: [phone]  [place]: [phone]  1/11/2025 9:48 AM

## 2025-01-11 NOTE — PLAN OF CARE
Problem: Chronic Conditions and Co-morbidities  Goal: Patient's chronic conditions and co-morbidity symptoms are monitored and maintained or improved  1/11/2025 0315 by Jared Jama, RN  Outcome: Progressing  Flowsheets (Taken 1/11/2025 0315)  Care Plan - Patient's Chronic Conditions and Co-Morbidity Symptoms are Monitored and Maintained or Improved:   Update acute care plan with appropriate goals if chronic or comorbid symptoms are exacerbated and prevent overall improvement and discharge   Collaborate with multidisciplinary team to address chronic and comorbid conditions and prevent exacerbation or deterioration   Monitor and assess patient's chronic conditions and comorbid symptoms for stability, deterioration, or improvement     Problem: Discharge Planning  Goal: Discharge to home or other facility with appropriate resources  1/11/2025 0315 by Jared Jama, RN  Outcome: Progressing  Flowsheets (Taken 1/11/2025 0315)  Discharge to home or other facility with appropriate resources:   Refer to discharge planning if patient needs post-hospital services based on physician order or complex needs related to functional status, cognitive ability or social support system   Identify discharge learning needs (meds, wound care, etc)   Identify barriers to discharge with patient and caregiver   Arrange for needed discharge resources and transportation as appropriate   Arrange for interpreters to assist at discharge as needed     Problem: ABCDS Injury Assessment  Goal: Absence of physical injury  1/11/2025 0315 by Jared Jama, RN  Outcome: Progressing  Flowsheets (Taken 1/11/2025 0315)  Absence of Physical Injury: Implement safety measures based on patient assessment     Problem: Cardiovascular - Adult  Goal: Maintains optimal cardiac output and hemodynamic stability  1/11/2025 0315 by Jared Jama, RN  Outcome: Progressing  Flowsheets (Taken 1/11/2025 0315)  Maintains optimal cardiac output and

## 2025-01-11 NOTE — PROGRESS NOTES
Electrophysiology - PROGRESS NOTE    Admit Date: 1/9/2025     Chief Complaint: AF/RVR     Interval History:   Patient seen and examined and notes reviewed. Patient is being followed for AF/RVR.  Patient been sitting watching television when he noted chest discomfort.  He called EMS and came to the hospital.  He was noted to be tachycardic and possible SVT.  He was given adenosine x 2 with no response.  He was noted to be in AF with RVR.  He does have a history of persistent atrial fibrillation for which he was seen approximately 6 months ago.  He had undergone a LINH and cardioversion however he had an early recurrence of AF.  He was initially placed on Xarelto however due to financial reasons he was changed to warfarin.  He was noted at that time to have a nonischemic cardiomyopathy with an EF of 20 to 25%.  He does have severe obstructive NEFTALI and is not on a CPAP.  He admitted that he was not taking his medications regularly.  His INR was 1.2 upon admission.  He currently is in AF with his heart rate controlled.    In: 475 [P.O.:465; I.V.:10]  Out: 925    Wt Readings from Last 2 Encounters:   01/11/25 (!) 143.1 kg (315 lb 7.7 oz)   09/24/24 (!) 140.6 kg (310 lb)       Data:   Scheduled Meds:   Scheduled Meds:   amiodarone  400 mg Oral BID    sodium chloride flush  5-40 mL IntraVENous 2 times per day    [Held by provider] furosemide  40 mg Oral BID    losartan  25 mg Oral Daily    spironolactone  25 mg Oral Daily    metoprolol succinate  150 mg Oral Daily    enoxaparin  30 mg SubCUTAneous BID    warfarin  5 mg Oral Once per day on Sunday Monday Wednesday Friday Saturday    And    [START ON 1/14/2025] warfarin  2.5 mg Oral Once per day on Tuesday Thursday     Continuous Infusions:   sodium chloride      dextrose       PRN Meds:.sodium chloride flush, sodium chloride, polyethylene glycol, acetaminophen **OR** acetaminophen, glucose, dextrose bolus **OR** dextrose  tachycardia  Abdomen: soft, non-tender; bowel sounds normal  Extremities: No edema, DP +  Psychiatric: normal insight and affect    Patient Active Problem List:     History of hypertension     History of bipolar disorder & PTSD     Class 3 severe obesity due to excess calories with serious comorbidity and body mass index (BMI) of 40.0 to 44.9 in adult     Mixed hyperlipidemia     Right knee pain     Left knee pain     Atrial fibrillation with rapid ventricular response (HCC)     On amiodarone therapy     Paroxysmal atrial fibrillation (HCC)     Cardiomyopathy (HCC)     Acute systolic CHF (congestive heart failure), NYHA class 2 (HCC)     Hx of atrial flutter     Atrial flutter with rapid ventricular response (HCC)     Chronic systolic congestive heart failure (HCC)        Assessment & Plan:        AF/AFL  2.  CMP  3.  Chronic sCHF  4.  HTN  5.  On OAC  6.  On amio     44 y/o man with a h/o HTN, HLD, bipolar disorder, PTSD, NEFTALI untx'd, persistent AF/AFL (3/2024) placed on Xarelto and dilt, echo (3/2024) EF 20-25%, LAE, septum 1.1, s/p LINH/DCCV to NSR w/ ERAF (3/2024), placed on amio, Xarelto who p/w CP, SOB, noted to be tachy in squad, given adenosine x 2 , found to be in AF/RVR, placed on IV amio, transitioned to po.  [id number] 2. TSH 1.38 (7.28.2021).      Persistent AF/AFL  - In AF/AFL - HR controlled  - S/p LINH/DCCV to NSR w/ ERAF (3/2024)  - On warfarin - INR 0.98 - no s/s bleeding - continue  - On amio 400 mg BID x 2 weeks then 200 mg QD  - On Toprol  mg QD   - Reviewed risk factors, pathophysiology, treatment options and lifestyle modification for atrial fibrillation: Blood pressure control, blood sugar control, healthy diet, minimal alcohol intake, no smoking, activity and exercise, manage stress sleep apnea evaluation and symptoms of a stroke.  - Keep K+ > 4.0 and Mg > 2.0  - Reviewed recent labs  - + NEFTALI - not on CPAP  - F/u w/ NP in 4-6 weeks  - Plan for rhythm management in March or April

## 2025-01-11 NOTE — PLAN OF CARE
Problem: Chronic Conditions and Co-morbidities  Goal: Patient's chronic conditions and co-morbidity symptoms are monitored and maintained or improved  1/11/2025 1702 by Анна Almeida RN  Outcome: Progressing  Flowsheets (Taken 1/11/2025 0315 by Jared Jama RN)  Care Plan - Patient's Chronic Conditions and Co-Morbidity Symptoms are Monitored and Maintained or Improved:   Update acute care plan with appropriate goals if chronic or comorbid symptoms are exacerbated and prevent overall improvement and discharge   Collaborate with multidisciplinary team to address chronic and comorbid conditions and prevent exacerbation or deterioration   Monitor and assess patient's chronic conditions and comorbid symptoms for stability, deterioration, or improvement     Problem: Discharge Planning  Goal: Discharge to home or other facility with appropriate resources  1/11/2025 1702 by Анна Almeida RN  Outcome: Progressing  Flowsheets (Taken 1/11/2025 0315 by Jared Jama RN)  Discharge to home or other facility with appropriate resources:   Refer to discharge planning if patient needs post-hospital services based on physician order or complex needs related to functional status, cognitive ability or social support system   Identify discharge learning needs (meds, wound care, etc)   Identify barriers to discharge with patient and caregiver   Arrange for needed discharge resources and transportation as appropriate   Arrange for interpreters to assist at discharge as needed     Problem: ABCDS Injury Assessment  Goal: Absence of physical injury  1/11/2025 1702 by Анна Almeida RN  Outcome: Progressing  Flowsheets (Taken 1/11/2025 0315 by Jared Jama RN)  Absence of Physical Injury: Implement safety measures based on patient assessment     Problem: Cardiovascular - Adult  Goal: Maintains optimal cardiac output and hemodynamic stability  1/11/2025 1702 by Анна Almeida RN  Outcome:

## 2025-01-13 ENCOUNTER — TELEPHONE (OUTPATIENT)
Dept: PRIMARY CARE CLINIC | Age: 45
End: 2025-01-13

## 2025-01-13 ENCOUNTER — TELEPHONE (OUTPATIENT)
Dept: CARDIOLOGY CLINIC | Age: 45
End: 2025-01-13

## 2025-01-13 NOTE — TELEPHONE ENCOUNTER
LVM with pt's mother per HIPAA as pt's Vmbox was full for pt to call to schedule appointment. Pt is schedule to see Dr. Torres but needs to be scheduled to see FW 4-6 weeks from hospital discharge

## 2025-01-13 NOTE — TELEPHONE ENCOUNTER
When pt is DC from hospital, and if he is still considering us as his PCP office, needs to schedule a f/u. He has not been seen here since April 2022     Left  message on patient's voice mail to call us with whether he is a patient and if so make appointment

## 2025-01-14 ENCOUNTER — TELEPHONE (OUTPATIENT)
Dept: PRIMARY CARE CLINIC | Age: 45
End: 2025-01-14

## 2025-01-14 NOTE — TELEPHONE ENCOUNTER
Pt d/c 1/11. Lvm to see if pt can come in 1/16 for INR check.     Nida Guillermo, PharmD, BCACP  [facility]   [facility] [place] Ph: [phone]  [facility] Ph: [phone]  1/14/2025 5:22 PM

## 2025-01-14 NOTE — TELEPHONE ENCOUNTER
Care Transitions Initial Follow Up Call    Outreach made within 2 business days of discharge: Yes    Patient: Gabriel Maya Patient : 1980   MRN: [id number]  Reason for Admission: A-fib  Discharge Date: 25       Spoke with: Left voicemail     Discharge department/facility: Home     TCM Interactive Patient Contact:  Was patient able to fill all prescriptions:   Was patient instructed to bring all medications to the follow-up visit:   Is patient taking all medications as directed in the discharge summary?   Does patient understand their discharge instructions:   Does patient have questions or concerns that need addressed prior to 7-14 day follow up office visit:     Additional needs identified to be addressed with provider  Called and left voicemail patient is seeing [facility]             Scheduled appointment with PCP within 7-14 days    Follow Up  Future Appointments   Date Time Provider Department Center   2025  9:00 AM Aldair Torres MD [facility]   3/4/2025  1:00 PM Jojo Gardner APRN - CNP [facility]       HUDSON CANNON MA

## 2025-01-15 ENCOUNTER — TELEPHONE (OUTPATIENT)
Dept: PRIMARY CARE CLINIC | Age: 45
End: 2025-01-15

## 2025-01-15 NOTE — TELEPHONE ENCOUNTER
Please dismiss patient. Has not been seen in office since April 2022. Has had multiple no shows since then.

## 2025-01-22 NOTE — TELEPHONE ENCOUNTER
LVM #2    Nida Guillermo, PharmD, BCACP  [facility]   [facility] [place] Ph: [phone]  [facility] [place] Ph: [phone]  1/22/2025 11:32 AM

## 2025-01-27 NOTE — TELEPHONE ENCOUNTER
R/s 1/30    Nida Guillermo, PharmD, BCACP  [facility]   [facility] [place] Ph: [phone]  [facility] [place] Ph: [phone]  1/27/2025 2:36 PM

## 2025-01-30 ENCOUNTER — TELEPHONE (OUTPATIENT)
Dept: PHARMACY | Age: 45
End: 2025-01-30

## 2025-01-30 NOTE — TELEPHONE ENCOUNTER
Patient no-showed to [facility] visit today.  Called patient and left voicemail asking patient to call back as soon as possible to reschedule appointment.    Maira Guillermo, PharmD, BCACP  1/30/2025 10:26 AM

## 2025-02-05 ENCOUNTER — TELEPHONE (OUTPATIENT)
Dept: PHARMACY | Age: 45
End: 2025-02-05

## 2025-02-05 NOTE — TELEPHONE ENCOUNTER
Patient no-showed to [facility] visit on 2/3.  Called patient and left voicemail asking patient to call back as soon as possible to reschedule appointment.  Called blanca Riggins.     Maira Guillermo, PharmD, BCACP  2/5/2025 2:30 PM

## 2025-02-06 ENCOUNTER — ANTI-COAG VISIT (OUTPATIENT)
Dept: PHARMACY | Age: 45
End: 2025-02-06
Payer: COMMERCIAL

## 2025-02-06 DIAGNOSIS — I48.91 ATRIAL FIBRILLATION WITH RAPID VENTRICULAR RESPONSE (HCC): Primary | ICD-10-CM

## 2025-02-06 DIAGNOSIS — I50.21 ACUTE SYSTOLIC CHF (CONGESTIVE HEART FAILURE), NYHA CLASS 2 (HCC): ICD-10-CM

## 2025-02-06 DIAGNOSIS — I48.91 ATRIAL FIBRILLATION WITH RAPID VENTRICULAR RESPONSE (HCC): ICD-10-CM

## 2025-02-06 LAB
INTERNATIONAL NORMALIZATION RATIO, POC: 7.1
PROTHROMBIN TIME, POC: 0

## 2025-02-06 PROCEDURE — 99213 OFFICE O/P EST LOW 20 MIN: CPT

## 2025-02-06 PROCEDURE — 85610 PROTHROMBIN TIME: CPT

## 2025-02-06 NOTE — PROGRESS NOTES
ANTICOAGULATION SERVICE    Gabriel Maya is a 44 y.o. male with PMHx significant for CHF, Afib, NEFTALI who presents to clinic 2/6/2025 for anticoagulation monitoring and adjustment. Patient was switched from Xarelto to warfarin on 5/17/24 due to lack of insurance.     Anticoagulation Indication(s):  Afib, CHF     Referring Physician:   Dr. Aldair Torres  Goal INR Range:  2-3  Duration of Anticoagulation Therapy:  indefinite  Time of day dose taken: AM  Product patient has at home: warfarin 5 mg (peach color)    [id number] Score for Atrial Fibrillation Stroke Risk   Risk   Factors  Component Value   C CHF Yes 1   H HTN No 0   A2 Age >= 75 No,  (44 y.o.) 0   D DM No 0   S2 Prior Stroke/TIA No 0   V Vascular Disease No 0   A Age 65-74 No,  (44 y.o.) 0   Sc Sex male 0    [id number]  Score  1   Score last updated 6/13/24 10:23 AM EDT    Click here for a link to the UpToDate guideline \"Atrial Fibrillation: Anticoagulation therapy to prevent embolization    Disclaimer: Risk Score calculation is dependent on accuracy of patient problem list and past encounter diagnosis.    INR Summary                           Warfarin regimen (mg)    Date INR   A/P   Sun Mon Tue Wed Thu Fri Sat Mg/wk  2/6 7.1 Above goal, hold x3d 2.5 5 2.5 5 INR   12/12 2.5 At goal, continue 5 5 2.5 5 2.5 5 5 30  8/26 2.14  In ED   8/22 2.3 At goal, continue 5 2.5 5 2.5 5 2.5 5 27.5  7/25 3.7 Above goal, hold 5 2.5 5 2.5 0/5 2.5 5 27.5  6/27 2.2 At goal, continue 5 2.5 5 2.5 5 2.5 5 27.5  6/13 4.1 Above goal, hold+dec 5 2.5 5 2.5 0/5 2.5 5 27.5  5/29 4.7 Above goal, hold+dec 5 2.5 5 0/5 0/5 2.5 5 30  5/17 Started warfarin  5 5 5 5 5 5 5 35    Last CBC:  Lab Results   Component Value Date    RBC 5.21 01/11/2025    HGB 16.5 01/11/2025    HCT 49.2 01/11/2025    MCV 94.5 01/11/2025    MCH 31.7 01/11/2025    MPV 9.8 01/11/2025    RDW 13.7 01/11/2025     01/11/2025       Patient History:  Recent hospitalizations/HC visits 1/9-1/11/25: [facility] for afib

## 2025-02-07 LAB
REASON FOR REJECTION: NORMAL
REJECTED TEST: NORMAL

## 2025-02-11 ENCOUNTER — TELEPHONE (OUTPATIENT)
Dept: PHARMACY | Age: 45
End: 2025-02-11

## 2025-02-11 NOTE — TELEPHONE ENCOUNTER
Patient no-showed to [facility] visit today.  Called patient and left voicemail asking patient to call back as soon as possible to reschedule appointment. Stressed urgency of r/s asap- last INR 7.1.    Maira Guillermo, PharmD, BCACP  2/11/2025 3:05 PM

## 2025-02-12 NOTE — TELEPHONE ENCOUNTER
LVM #2 to r/s ASAP.     Niad Guillermo, PharmD, BCACP  [facility]   [facility] [place] Ph: [phone]  [facility] [place] Ph: [phone]  2/12/2025 9:19 AM

## 2025-02-13 NOTE — TELEPHONE ENCOUNTER
LVM #3 to r/s ASAP. Explained that we will need to discharge patient from clinic if he is unable to keep appointments as scheduled and we are unable to reach him over the phone.     Nida Guillermo, PharmD, BCACP  [facility]   [facility] [place] Ph: [phone]  [facility] [place] Ph: [phone]  2/13/2025 9:57 AM

## 2025-02-17 NOTE — TELEPHONE ENCOUNTER
Called again, VM full.     Nida Guillermo, PharmD, BCACP  [facility]   [facility] [place] Ph: [phone]  [facility] [place] Ph: [phone]  2/17/2025 11:32 AM

## 2025-02-19 NOTE — TELEPHONE ENCOUNTER
Called again VM full.     Nida Guillermo, PharmD, BCACP  [facility]   [facility] [place] Ph: [phone]  [facility] [place] Ph: [phone]  2/19/2025 12:35 PM

## 2025-02-21 ENCOUNTER — OFFICE VISIT (OUTPATIENT)
Dept: CARDIOLOGY CLINIC | Age: 45
End: 2025-02-21
Payer: COMMERCIAL

## 2025-02-21 VITALS
SYSTOLIC BLOOD PRESSURE: 120 MMHG | BODY MASS INDEX: 46.65 KG/M2 | DIASTOLIC BLOOD PRESSURE: 82 MMHG | HEART RATE: 72 BPM | HEIGHT: 69 IN | WEIGHT: 315 LBS | RESPIRATION RATE: 20 BRPM

## 2025-02-21 DIAGNOSIS — I48.0 PAROXYSMAL ATRIAL FIBRILLATION (HCC): Chronic | ICD-10-CM

## 2025-02-21 DIAGNOSIS — I42.9 CARDIOMYOPATHY, UNSPECIFIED TYPE (HCC): Primary | Chronic | ICD-10-CM

## 2025-02-21 PROCEDURE — 99214 OFFICE O/P EST MOD 30 MIN: CPT | Performed by: INTERNAL MEDICINE

## 2025-02-21 PROCEDURE — G2211 COMPLEX E/M VISIT ADD ON: HCPCS | Performed by: INTERNAL MEDICINE

## 2025-02-21 RX ORDER — LOSARTAN POTASSIUM 25 MG/1
25 TABLET ORAL DAILY
Qty: 90 TABLET | Refills: 3 | Status: SHIPPED | OUTPATIENT
Start: 2025-02-21

## 2025-02-21 RX ORDER — FUROSEMIDE 40 MG/1
40 TABLET ORAL DAILY
Qty: 90 TABLET | Refills: 3 | Status: SHIPPED | OUTPATIENT
Start: 2025-02-21

## 2025-02-21 RX ORDER — METOPROLOL SUCCINATE 100 MG/1
100 TABLET, EXTENDED RELEASE ORAL 2 TIMES DAILY
Qty: 180 TABLET | Refills: 3 | Status: SHIPPED | OUTPATIENT
Start: 2025-02-21

## 2025-02-21 RX ORDER — AMIODARONE HYDROCHLORIDE 200 MG/1
200 TABLET ORAL 2 TIMES DAILY
Qty: 180 TABLET | Refills: 3 | Status: SHIPPED | OUTPATIENT
Start: 2025-02-21

## 2025-02-21 RX ORDER — SPIRONOLACTONE 25 MG/1
25 TABLET ORAL DAILY
Qty: 90 TABLET | Refills: 3 | Status: SHIPPED | OUTPATIENT
Start: 2025-02-21

## 2025-02-21 NOTE — PROGRESS NOTES
Cc: PAFRVR, HFrEF due to NICMO    HPI:     Gabriel Maya is a 44 y.o. overweight male with history of likely NEFTALI untreated, PAFRVR status post DC cardioversion times 3/26 and 3/28/2024, both of them failed, now persistent A-fib with controlled ventricular response, new HFrEF due to NICMO, bipolar disorder, PTSD.     Echo 3/25/2024: Normal LV size and wall thickness, LVEF 20-25%, indeterminate diastolic function due to A-fib, no LV apical clot, normal LA size, normal RV size with mild dysfunction, mild MR/TR.      ECG 3/23/24 consistent with AF RVR, 173 bpm, no ischemic changes.    LHC 3/29/2024: Normal coronaries, LVEDP 12 mmHg.    03/2024 TSH normal, free T3 and free T4 normal    Patient was admitted at the [facility] 1/9 - 1/11/2025 with PAF RVR.  EP / Dr. Shaikh was consulted and patient was started on amiodarone drip.  INR was subtherapeutic due to patient not taking his medications.  DC cardioversion was considered but not attempted because patient wanted his wife to be present during procedures (he was in [place]).  He was released on amiodarone 400 mg p.o. twice daily for 2 weeks then changed to 200 mg daily.    Patient returns to the clinic today for follow-up with his wife Rosa.  Patient reports no concerning symptoms.  He is compliant with his medications.  He is currently unemployed and is looking for Social Security benefits. He has a form from an  requesting more information about his medical conditions and ability to work.       Histories     Past Medical History:   has a past medical history of Atrial fibrillation (HCC), History of bipolar disorder & PTSD, Hypertension, and Mixed hyperlipidemia.    Surgical History:   has a past surgical history that includes [place] tooth extraction and Inguinal hernia repair (Bilateral).     Social History:   reports that he has never smoked. He has never used smokeless tobacco. He reports that he does not currently use alcohol. He reports

## 2025-03-04 NOTE — TELEPHONE ENCOUNTER
Attempted to call patient for reschedule, mailbox full, unable to leave voicemail.     Ricci Hargrove, PharmD  PGY1 Pharmacy Resident   Wireless: [phone]  03/04/25

## 2025-03-07 NOTE — TELEPHONE ENCOUNTER
Attempted to contact patient again today. Phone call went straight to  but unable to leave  due to mailbox being full.     Ricci Hargrove, PharmD  PGY1 Pharmacy Resident   Wireless: [phone]  03/07/25

## 2025-03-14 NOTE — TELEPHONE ENCOUNTER
Attempted to contact patient to reschedule appointment. Unsuccessful and unable to leave a voicemail    Ricci Hargrove, PharmD  PGY1 Pharmacy Resident   Wireless: [phone]  03/14/25

## 2025-04-21 ENCOUNTER — HOSPITAL ENCOUNTER (EMERGENCY)
Age: 45
Discharge: HOME | End: 2025-04-21
Attending: STUDENT IN AN ORGANIZED HEALTH CARE EDUCATION/TRAINING PROGRAM
Payer: COMMERCIAL

## 2025-04-21 VITALS
TEMPERATURE: 97.9 F | HEIGHT: 69 IN | DIASTOLIC BLOOD PRESSURE: 86 MMHG | OXYGEN SATURATION: 97 % | RESPIRATION RATE: 20 BRPM | SYSTOLIC BLOOD PRESSURE: 147 MMHG | HEART RATE: 76 BPM | WEIGHT: 315 LBS | BODY MASS INDEX: 46.65 KG/M2

## 2025-04-21 DIAGNOSIS — S00.411A ABRASION OF RIGHT EAR CANAL, INITIAL ENCOUNTER: Primary | ICD-10-CM

## 2025-04-21 PROCEDURE — 99283 EMERGENCY DEPT VISIT LOW MDM: CPT

## 2025-04-21 ASSESSMENT — LIFESTYLE VARIABLES
HOW MANY STANDARD DRINKS CONTAINING ALCOHOL DO YOU HAVE ON A TYPICAL DAY: 1 OR 2
HOW OFTEN DO YOU HAVE A DRINK CONTAINING ALCOHOL: MONTHLY OR LESS

## 2025-04-22 NOTE — ED PROVIDER NOTES
Skin is warm and dry.   Neurological:      General: No focal deficit present.      Mental Status: He is alert and oriented to person, place, and time.   Psychiatric:         Mood and Affect: Mood normal.         Behavior: Behavior normal.         Thought Content: Thought content normal.           LAB RESULTS     No results found for this visit on 04/21/25.        IMAGING RESULTS     No orders to display   Any applicable radiology studies including x-ray, CT, MRI, and/or ultrasound were reviewed independently by me in addition to the radiologist.  I reviewed all radiology images and reports as well from this evaluation.        MEDICAL DECISION MAKING     Patient presented with No chief complaint on file. as described above.  History obtained from patient.  I reviewed patient's prior medical history.  Vital signs reviewed and within normal limits.  Patient nontoxic appearing and stable.  Physical exam as above.  Overall well-appearing male.  Examination of bilateral ears demonstrates superficial abrasion over the inferior right ear canal with clotted blood present.  No active bleeding or discharge.  Tympanic membrane intact.    History and physical exam consistent with abrasion to the right ear canal.  Tympanic membrane appears intact.  No evidence of infection.  No indication for antibiotics.  Encouraged patient to use Debrox instead of Peridex and Q-tips at home.  Patient states he does not have an ENT, but has a long history of ear issues since having tubes as a child.  Encouraged him to follow-up with his primary care provider as well as ENT.  Patient verbalized understanding and agreement with plan for outpatient management.    During the patient's ED course, the patient was given:  Medications - No data to display    Social determinants of health:   None applicable    Chronic conditions potentially affecting care:   atrial fibrillation.  Due to anticoagulation use increasing risk of bleed    IDr. Chairez, am

## 2025-06-20 ENCOUNTER — HOSPITAL ENCOUNTER (OUTPATIENT)
Dept: SLEEP CENTER | Age: 45
Discharge: HOME | End: 2025-06-20

## 2025-06-20 DIAGNOSIS — G47.31 CENTRAL SLEEP APNEA: ICD-10-CM
